# Patient Record
Sex: MALE | Race: WHITE | NOT HISPANIC OR LATINO | Employment: FULL TIME | URBAN - METROPOLITAN AREA
[De-identification: names, ages, dates, MRNs, and addresses within clinical notes are randomized per-mention and may not be internally consistent; named-entity substitution may affect disease eponyms.]

---

## 2017-02-14 RX ORDER — BENAZEPRIL HYDROCHLORIDE 10 MG/1
10 TABLET ORAL DAILY
Qty: 90 TABLET | Refills: 0 | Status: SHIPPED | OUTPATIENT
Start: 2017-02-14 | End: 2017-06-24 | Stop reason: SDUPTHER

## 2017-04-03 ENCOUNTER — OFFICE VISIT (OUTPATIENT)
Dept: FAMILY MEDICINE CLINIC | Facility: CLINIC | Age: 47
End: 2017-04-03

## 2017-04-03 VITALS
RESPIRATION RATE: 16 BRPM | BODY MASS INDEX: 41.75 KG/M2 | OXYGEN SATURATION: 97 % | SYSTOLIC BLOOD PRESSURE: 122 MMHG | HEIGHT: 73 IN | HEART RATE: 74 BPM | WEIGHT: 315 LBS | DIASTOLIC BLOOD PRESSURE: 72 MMHG

## 2017-04-03 DIAGNOSIS — H65.00 ACUTE SEROUS OTITIS MEDIA, RECURRENCE NOT SPECIFIED, UNSPECIFIED LATERALITY: Primary | ICD-10-CM

## 2017-04-03 PROCEDURE — 99213 OFFICE O/P EST LOW 20 MIN: CPT | Performed by: FAMILY MEDICINE

## 2017-04-03 RX ORDER — AZITHROMYCIN 250 MG/1
TABLET, FILM COATED ORAL
Qty: 6 TABLET | Refills: 0 | Status: SHIPPED | OUTPATIENT
Start: 2017-04-03 | End: 2017-05-16

## 2017-04-03 RX ORDER — NEOMYCIN SULFATE, POLYMYXIN B SULFATE AND HYDROCORTISONE 10; 3.5; 1 MG/ML; MG/ML; [USP'U]/ML
3 SUSPENSION/ DROPS AURICULAR (OTIC) 4 TIMES DAILY
Qty: 10 ML | Refills: 0 | Status: SHIPPED | OUTPATIENT
Start: 2017-04-03 | End: 2017-06-14

## 2017-04-03 RX ORDER — IBUPROFEN 600 MG/1
600 TABLET ORAL EVERY 6 HOURS PRN
Qty: 60 TABLET | Refills: 0 | Status: SHIPPED | OUTPATIENT
Start: 2017-04-03 | End: 2017-06-14

## 2017-04-03 NOTE — PROGRESS NOTES
"Subjective   Ming Swenson is a 46 y.o. male.     Earache    There is pain in the right ear. This is a new problem. The current episode started in the past 7 days. The problem occurs constantly. The problem has been unchanged. There has been no fever. The pain is moderate. Pertinent negatives include no coughing, diarrhea, hearing loss or sore throat. He has tried nothing (Sonido a dentist and had some x-rays and no dental problem was identified) for the symptoms. The treatment provided no relief.        The following portions of the patient's history were reviewed and updated as appropriate: allergies, current medications, past social history and problem list.    Review of Systems   Constitutional: Negative for chills and fever.   HENT: Positive for ear pain. Negative for facial swelling, hearing loss, mouth sores, nosebleeds, postnasal drip, sinus pressure, sore throat and trouble swallowing.    Eyes: Negative for discharge.   Respiratory: Negative for cough and shortness of breath.    Cardiovascular: Negative for chest pain and palpitations.   Gastrointestinal: Negative for constipation and diarrhea.   Neurological: Negative for dizziness.       Objective   /72  Pulse 74  Resp 16  Ht 73\" (185.4 cm)  Wt (!) 368 lb 12.8 oz (167 kg)  SpO2 97%  BMI 48.66 kg/m2  Physical Exam   Constitutional: He is oriented to person, place, and time. He appears well-developed and well-nourished. He is cooperative.   HENT:   Head: Normocephalic.   Right Ear: External ear normal.   Left Ear: External ear normal.   Nose: Nose normal.   Mouth/Throat: Oropharynx is clear and moist. No oropharyngeal exudate.   Right tympanic membrane was dull and pink   Eyes: Conjunctivae are normal. Pupils are equal, round, and reactive to light. No scleral icterus.   Neck: Neck supple. Carotid bruit is not present. No thyromegaly present.   Tender along the right preauricular node. Erythema of the ear canal   Cardiovascular: Normal rate and " regular rhythm.    Pulmonary/Chest: Effort normal and breath sounds normal.   Abdominal: There is no hepatosplenomegaly.   Musculoskeletal: Normal range of motion.   Neurological: He is alert and oriented to person, place, and time.   No focal deficits no lateralizing signs   Skin: Skin is warm and dry. No rash noted.   Psychiatric: He has a normal mood and affect. Cognition and memory are normal.   Nursing note and vitals reviewed.      Assessment/Plan   Problem List Items Addressed This Visit     None      Visit Diagnoses     Acute serous otitis media, recurrence not specified, unspecified laterality    -  Primary          New Medications Ordered This Visit   Medications   • azithromycin (ZITHROMAX) 250 MG tablet     Sig: Take 2 tablets the first day, then 1 tablet daily for 4 days.     Dispense:  6 tablet     Refill:  0   • neomycin-polymyxin-hydrocortisone (CORTISPORIN) 3.5-07066-8 otic suspension     Sig: Administer 3 drops to the right ear 4 (Four) Times a Day.     Dispense:  10 mL     Refill:  0   • ibuprofen (ADVIL,MOTRIN) 600 MG tablet     Sig: Take 1 tablet by mouth Every 6 (Six) Hours As Needed for Mild Pain (1-3).     Dispense:  60 tablet     Refill:  0

## 2017-04-12 ENCOUNTER — TELEPHONE (OUTPATIENT)
Dept: FAMILY MEDICINE CLINIC | Facility: CLINIC | Age: 47
End: 2017-04-12

## 2017-04-12 NOTE — TELEPHONE ENCOUNTER
----- Message from Vickie Gomez sent at 4/11/2017  8:17 AM EDT -----  Contact: [phone]  PATIENT WAS SEEN LAST WEEK AND IS CONCERNED THAT HE IS HAVING EAR DRAINAGE. PATIENT IS NOT SURE IF THIS IS NORMAL. PATIENT SAYS HE IS ONLY HAVING SLIGHT PAIN NOW        [facility] [place]      Did not contact patient because he was scheduled for an appt today, but he cancelled because he said he was feeling better.    TRINY VELAZQUEZ

## 2017-05-16 ENCOUNTER — OFFICE VISIT (OUTPATIENT)
Dept: FAMILY MEDICINE CLINIC | Facility: CLINIC | Age: 47
End: 2017-05-16

## 2017-05-16 VITALS
BODY MASS INDEX: 41.75 KG/M2 | WEIGHT: 315 LBS | SYSTOLIC BLOOD PRESSURE: 132 MMHG | DIASTOLIC BLOOD PRESSURE: 86 MMHG | RESPIRATION RATE: 16 BRPM | HEIGHT: 73 IN

## 2017-05-16 DIAGNOSIS — E66.01 MORBID OBESITY DUE TO EXCESS CALORIES (HCC): Primary | ICD-10-CM

## 2017-05-16 PROCEDURE — 99213 OFFICE O/P EST LOW 20 MIN: CPT | Performed by: FAMILY MEDICINE

## 2017-06-14 ENCOUNTER — OFFICE VISIT (OUTPATIENT)
Dept: FAMILY MEDICINE CLINIC | Facility: CLINIC | Age: 47
End: 2017-06-14

## 2017-06-14 VITALS
HEART RATE: 74 BPM | OXYGEN SATURATION: 98 % | SYSTOLIC BLOOD PRESSURE: 124 MMHG | BODY MASS INDEX: 41.75 KG/M2 | DIASTOLIC BLOOD PRESSURE: 84 MMHG | WEIGHT: 315 LBS | RESPIRATION RATE: 16 BRPM | HEIGHT: 73 IN

## 2017-06-14 DIAGNOSIS — E66.01 MORBID OBESITY DUE TO EXCESS CALORIES (HCC): Primary | ICD-10-CM

## 2017-06-14 PROCEDURE — 99213 OFFICE O/P EST LOW 20 MIN: CPT | Performed by: FAMILY MEDICINE

## 2017-06-15 NOTE — PROGRESS NOTES
"Subjective   Ming Swenson is a 46 y.o. male.     Obesity   This is a chronic problem. The problem has been gradually improving. Associated symptoms include arthralgias. Pertinent negatives include no fever, myalgias or sore throat. Treatments tried: Low carbohydrate diet and exercise. The treatment provided mild relief.        The following portions of the patient's history were reviewed and updated as appropriate: allergies, current medications, past social history and problem list.    Review of Systems   Constitutional: Negative for fever.   HENT: Negative.  Negative for sore throat.    Respiratory: Negative.    Cardiovascular: Negative.    Musculoskeletal: Positive for arthralgias. Negative for myalgias.       Objective   /84  Pulse 74  Resp 16  Ht 73\" (185.4 cm)  Wt (!) 361 lb (164 kg)  SpO2 98%  BMI 47.63 kg/m2  Physical Exam   Constitutional: He appears well-developed.   HENT:   Head: Normocephalic and atraumatic.   Eyes: Conjunctivae are normal. Pupils are equal, round, and reactive to light.   Neck: Normal range of motion. Neck supple.   Cardiovascular: Normal rate and regular rhythm.    Pulmonary/Chest: Effort normal and breath sounds normal.   Skin: Skin is warm and dry.   Nursing note and vitals reviewed.      Assessment/Plan   Problem List Items Addressed This Visit        Digestive    Morbid obesity - Primary          No orders of the defined types were placed in this encounter.    More encouragement is given technically needs more exercise.  Avoid snacking late in the evening follow-up in one month complete form for bariatric surgery         " No

## 2017-06-26 RX ORDER — BENAZEPRIL HYDROCHLORIDE 10 MG/1
TABLET ORAL
Qty: 90 TABLET | Refills: 1 | Status: SHIPPED | OUTPATIENT
Start: 2017-06-26 | End: 2017-07-14 | Stop reason: SDUPTHER

## 2017-07-14 ENCOUNTER — OFFICE VISIT (OUTPATIENT)
Dept: FAMILY MEDICINE CLINIC | Facility: CLINIC | Age: 47
End: 2017-07-14

## 2017-07-14 VITALS
OXYGEN SATURATION: 96 % | RESPIRATION RATE: 16 BRPM | TEMPERATURE: 98.1 F | HEART RATE: 74 BPM | HEIGHT: 73 IN | SYSTOLIC BLOOD PRESSURE: 130 MMHG | DIASTOLIC BLOOD PRESSURE: 78 MMHG | BODY MASS INDEX: 41.75 KG/M2 | WEIGHT: 315 LBS

## 2017-07-14 DIAGNOSIS — Z00.00 WELL ADULT EXAM: Primary | ICD-10-CM

## 2017-07-14 DIAGNOSIS — I10 ESSENTIAL HYPERTENSION: ICD-10-CM

## 2017-07-14 DIAGNOSIS — E66.01 MORBID OBESITY DUE TO EXCESS CALORIES (HCC): ICD-10-CM

## 2017-07-14 LAB
ALBUMIN SERPL-MCNC: 4.4 G/DL (ref 3.2–4.8)
ALBUMIN/GLOB SERPL: 1.5 G/DL (ref 1.5–2.5)
ALP SERPL-CCNC: 63 U/L (ref 25–100)
ALT SERPL W P-5'-P-CCNC: 46 U/L (ref 7–40)
ANION GAP SERPL CALCULATED.3IONS-SCNC: 9 MMOL/L (ref 3–11)
ARTICHOKE IGE QN: 126 MG/DL (ref 0–130)
AST SERPL-CCNC: 27 U/L (ref 0–33)
BASOPHILS # BLD AUTO: 0.07 10*3/MM3 (ref 0–0.2)
BASOPHILS NFR BLD AUTO: 1 % (ref 0–1)
BILIRUB BLD-MCNC: NEGATIVE MG/DL
BILIRUB SERPL-MCNC: 0.3 MG/DL (ref 0.3–1.2)
BUN BLD-MCNC: 14 MG/DL (ref 9–23)
BUN/CREAT SERPL: 20 (ref 7–25)
CALCIUM SPEC-SCNC: 9.8 MG/DL (ref 8.7–10.4)
CHLORIDE SERPL-SCNC: 98 MMOL/L (ref 99–109)
CHOLEST SERPL-MCNC: 196 MG/DL (ref 0–200)
CLARITY, POC: CLEAR
CO2 SERPL-SCNC: 26 MMOL/L (ref 20–31)
COLOR UR: YELLOW
CREAT BLD-MCNC: 0.7 MG/DL (ref 0.6–1.3)
DEPRECATED RDW RBC AUTO: 45.8 FL (ref 37–54)
EOSINOPHIL # BLD AUTO: 0.29 10*3/MM3 (ref 0–0.3)
EOSINOPHIL NFR BLD AUTO: 4.1 % (ref 0–3)
ERYTHROCYTE [DISTWIDTH] IN BLOOD BY AUTOMATED COUNT: 14.1 % (ref 11.3–14.5)
GFR SERPL CREATININE-BSD FRML MDRD: 123 ML/MIN/1.73
GLOBULIN UR ELPH-MCNC: 3 GM/DL
GLUCOSE BLD-MCNC: 100 MG/DL (ref 70–100)
GLUCOSE UR STRIP-MCNC: NEGATIVE MG/DL
HCT VFR BLD AUTO: 47.4 % (ref 38.9–50.9)
HDLC SERPL-MCNC: 44 MG/DL (ref 40–60)
HGB BLD-MCNC: 15.6 G/DL (ref 13.1–17.5)
IMM GRANULOCYTES # BLD: 0.02 10*3/MM3 (ref 0–0.03)
IMM GRANULOCYTES NFR BLD: 0.3 % (ref 0–0.6)
KETONES UR QL: NEGATIVE
LEUKOCYTE EST, POC: NEGATIVE
LYMPHOCYTES # BLD AUTO: 1.96 10*3/MM3 (ref 0.6–4.8)
LYMPHOCYTES NFR BLD AUTO: 27.8 % (ref 24–44)
MCH RBC QN AUTO: 29.3 PG (ref 27–31)
MCHC RBC AUTO-ENTMCNC: 32.9 G/DL (ref 32–36)
MCV RBC AUTO: 88.9 FL (ref 80–99)
MONOCYTES # BLD AUTO: 0.62 10*3/MM3 (ref 0–1)
MONOCYTES NFR BLD AUTO: 8.8 % (ref 0–12)
NEUTROPHILS # BLD AUTO: 4.09 10*3/MM3 (ref 1.5–8.3)
NEUTROPHILS NFR BLD AUTO: 58 % (ref 41–71)
NITRITE UR-MCNC: NEGATIVE MG/ML
PH UR: 5.5 [PH] (ref 5–8)
PLATELET # BLD AUTO: 216 10*3/MM3 (ref 150–450)
PMV BLD AUTO: 12.5 FL (ref 6–12)
POTASSIUM BLD-SCNC: 4.8 MMOL/L (ref 3.5–5.5)
PROT SERPL-MCNC: 7.4 G/DL (ref 5.7–8.2)
PROT UR STRIP-MCNC: NEGATIVE MG/DL
PSA SERPL-MCNC: 0.25 NG/ML (ref 0–4)
RBC # BLD AUTO: 5.33 10*6/MM3 (ref 4.2–5.76)
RBC # UR STRIP: NEGATIVE /UL
SODIUM BLD-SCNC: 133 MMOL/L (ref 132–146)
SP GR UR: 1.02 (ref 1–1.03)
TRIGL SERPL-MCNC: 294 MG/DL (ref 0–150)
TSH SERPL DL<=0.05 MIU/L-ACNC: 1.28 MIU/ML (ref 0.35–5.35)
UROBILINOGEN UR QL: NORMAL
WBC NRBC COR # BLD: 7.05 10*3/MM3 (ref 3.5–10.8)

## 2017-07-14 PROCEDURE — 84153 ASSAY OF PSA TOTAL: CPT | Performed by: FAMILY MEDICINE

## 2017-07-14 PROCEDURE — 84443 ASSAY THYROID STIM HORMONE: CPT | Performed by: FAMILY MEDICINE

## 2017-07-14 PROCEDURE — 93000 ELECTROCARDIOGRAM COMPLETE: CPT | Performed by: FAMILY MEDICINE

## 2017-07-14 PROCEDURE — 36415 COLL VENOUS BLD VENIPUNCTURE: CPT | Performed by: FAMILY MEDICINE

## 2017-07-14 PROCEDURE — 80053 COMPREHEN METABOLIC PANEL: CPT | Performed by: FAMILY MEDICINE

## 2017-07-14 PROCEDURE — 99396 PREV VISIT EST AGE 40-64: CPT | Performed by: FAMILY MEDICINE

## 2017-07-14 PROCEDURE — 85025 COMPLETE CBC W/AUTO DIFF WBC: CPT | Performed by: FAMILY MEDICINE

## 2017-07-14 PROCEDURE — 81003 URINALYSIS AUTO W/O SCOPE: CPT | Performed by: FAMILY MEDICINE

## 2017-07-14 PROCEDURE — 80061 LIPID PANEL: CPT | Performed by: FAMILY MEDICINE

## 2017-07-14 RX ORDER — BENAZEPRIL HYDROCHLORIDE 10 MG/1
10 TABLET ORAL DAILY
Qty: 90 TABLET | Refills: 1 | Status: SHIPPED | OUTPATIENT
Start: 2017-07-14 | End: 2018-07-05 | Stop reason: SDUPTHER

## 2017-07-14 NOTE — PROGRESS NOTES
"Subjective   Ming Swenson is a 46 y.o. male and is here for a comprehensive physical exam. The patient reports problems - weight.        Are you taking aspirin daily? no        The following portions of the patient's history were reviewed and updated as appropriate: allergies, current medications, past family history, past medical history, past social history, past surgical history and problem list.    Review of Systems  Do you have pain that bothers you in your daily life? yes  A comprehensive review of systems was negative.  Except for chronic pain in his right ankle ongoing issues with obesity lands on gastric surgery.    Objective   /78  Pulse 74  Temp 98.1 °F (36.7 °C)  Resp 16  Ht 73\" (185.4 cm)  Wt (!) 361 lb 6.4 oz (164 kg)  SpO2 96%  BMI 47.68 kg/m2    General Appearance:    Alert, cooperative, no distress, appears stated age,Morbidly obese .   Head:    Normocephalic, without obvious abnormality, atraumatic   Eyes:    PERRL, conjunctiva/corneas clear, EOM's intact, fundi     benign, both eyes        Ears:    Normal TM's and external ear canals, both ears   Nose:   Nares normal, septum midline, mucosa normal, no drainage    or sinus tenderness   Throat:   Lips, mucosa, and tongue normal; teeth and gums normal   Neck:   Supple, symmetrical, trachea midline, no adenopathy;        thyroid:  No enlargement/tenderness/nodules; no carotid    bruit or JVD   Back:     Symmetric, no curvature, ROM normal, no CVA tenderness   Lungs:     Clear to auscultation bilaterally, respirations unlabored   Chest wall:    No tenderness or deformity   Heart:    Regular rate and rhythm, S1 and S2 normal, no murmur, rub   or gallop   Abdomen:     Soft, non-tender, bowel sounds active all four quadrants,     no masses, no organomegaly   Genitalia:    Normal male without lesion, discharge or tenderness   Rectal:    deferred   Extremities:   Extremities normal, atraumatic, no cyanosis or edema   Pulses:   2+ and symmetric " all extremities   Skin:   Skin color, texture, turgor normal, no rashes or lesions   Lymph nodes:   Cervical, supraclavicular, and axillary nodes normal   Neurologic:   CNII-XII intact. Normal strength, sensation and reflexes       throughout        Assessment/Plan   Healthy male exam.      1. Hypertension, morbid obesity, DJD right ankle, CMT disease.  2. Patient Counseling:  --Nutrition: Stressed importance of moderation in sodium/caffeine intake, saturated fat and cholesterol, caloric balance, sufficient intake of fresh fruits, vegetables, fiber, calcium, iron, and 1 mg of folate supplement per day (for females capable of pregnancy).  --Discussed the issue of estrogen replacement, calcium supplement, and the daily use of baby aspirin.  --Exercise: Stressed the importance of regular exercise.   --Substance Abuse: Discussed cessation/primary prevention of tobacco, alcohol, or other drug use; driving or other dangerous activities under the influence; availability of treatment for abuse.    --Sexuality: Discussed sexually transmitted diseases, partner selection, use of condoms, avoidance of unintended pregnancy  and contraceptive alternatives.   --Injury prevention: Discussed safety belts, safety helmets, smoke detector, smoking near bedding or upholstery.   --Dental health: Discussed importance of regular tooth brushing, flossing, and dental visits.  --Immunizations reviewed.  --Discussed benefits of screening colonoscopy.  --After hours service discussed with patient    3. Discussed the patient's BMI with him.  The BMI is above average; BMI management plan is completed  4. Follow up in 1 month        ECG 12 Lead  Date/Time: 7/14/2017 2:53 PM  Performed by: CAMILA LOVE  Authorized by: CAMILA LOVE   Rhythm: sinus rhythm  Rate: normal  BPM: 68  Conduction: conduction normal  ST Segments: ST segments normal  T Waves: T waves normal  QRS axis: normal  Q waves: II, III and aVF  Clinical impression: non-specific  ECG  Comments: Borderline tracing inferior Q waves are unchanged from previous years.          See cpx form

## 2017-08-14 ENCOUNTER — OFFICE VISIT (OUTPATIENT)
Dept: FAMILY MEDICINE CLINIC | Facility: CLINIC | Age: 47
End: 2017-08-14

## 2017-08-14 VITALS
HEART RATE: 76 BPM | OXYGEN SATURATION: 98 % | WEIGHT: 315 LBS | RESPIRATION RATE: 16 BRPM | BODY MASS INDEX: 41.75 KG/M2 | SYSTOLIC BLOOD PRESSURE: 128 MMHG | DIASTOLIC BLOOD PRESSURE: 82 MMHG | HEIGHT: 73 IN

## 2017-08-14 DIAGNOSIS — I10 ESSENTIAL HYPERTENSION: ICD-10-CM

## 2017-08-14 DIAGNOSIS — S93.401S SPRAIN OF RIGHT ANKLE, UNSPECIFIED LIGAMENT, SEQUELA: ICD-10-CM

## 2017-08-14 DIAGNOSIS — E66.01 MORBID OBESITY DUE TO EXCESS CALORIES (HCC): Primary | ICD-10-CM

## 2017-08-14 PROCEDURE — 99213 OFFICE O/P EST LOW 20 MIN: CPT | Performed by: FAMILY MEDICINE

## 2017-08-14 NOTE — PROGRESS NOTES
"Subjective   Ming Swenson is a 46 y.o. male.     Hypertension   This is a chronic problem. The problem is controlled. Pertinent negatives include no chest pain, headaches, palpitations, peripheral edema or shortness of breath. Past treatments include ACE inhibitors. The current treatment provides significant improvement. Compliance problems include diet.  There is no history of angina, kidney disease or CAD/MI.   Obesity   This is a chronic (Recently returned from a vacation and ate more than he should've anything on getting back on his diet today) problem. The problem has been gradually improving. Associated symptoms include arthralgias. Pertinent negatives include no chest pain, congestion, coughing, fatigue, headaches, myalgias, nausea, rash, sore throat or weakness. The treatment provided mild relief.        The following portions of the patient's history were reviewed and updated as appropriate: allergies, current medications, past social history and problem list.    Review of Systems   Constitutional: Negative for fatigue and unexpected weight change.   HENT: Negative for congestion and sore throat.    Respiratory: Negative for cough, chest tightness and shortness of breath.    Cardiovascular: Negative for chest pain, palpitations and leg swelling.   Gastrointestinal: Negative for nausea.   Genitourinary: Negative for dysuria and frequency.   Musculoskeletal: Positive for arthralgias. Negative for myalgias.        Pain and swelling of the right ankle which he turned on vacation   Skin: Negative for color change and rash.   Neurological: Negative for dizziness, syncope, weakness and headaches.       Objective   /82  Pulse 76  Resp 16  Ht 73\" (185.4 cm)  Wt (!) 366 lb 12.8 oz (166 kg)  SpO2 98%  BMI 48.39 kg/m2  Physical Exam   Constitutional: He is oriented to person, place, and time. He appears well-developed and well-nourished. He is cooperative.   HENT:   Head: Normocephalic.   Right Ear: " External ear normal.   Left Ear: External ear normal.   Nose: Nose normal.   Mouth/Throat: Oropharynx is clear and moist.   Eyes: Conjunctivae are normal. Pupils are equal, round, and reactive to light. No scleral icterus.   Neck: Neck supple. Carotid bruit is not present. No thyromegaly present.   Cardiovascular: Normal rate, regular rhythm and normal heart sounds.    Pulmonary/Chest: Effort normal and breath sounds normal.   Abdominal: There is no hepatosplenomegaly.   Musculoskeletal: Normal range of motion. He exhibits edema and tenderness.   An ankle is swollen and mildly tender   Neurological: He is alert and oriented to person, place, and time.   No focal deficits no lateralizing signs   Skin: Skin is warm and dry. No rash noted.   Psychiatric: He has a normal mood and affect. Cognition and memory are normal.   Nursing note and vitals reviewed.      Assessment/Plan   Problem List Items Addressed This Visit        Cardiovascular and Mediastinum    Hypertension       Digestive    Morbid obesity - Primary      Other Visit Diagnoses     Sprain of right ankle, unspecified ligament, sequela              No orders of the defined types were placed in this encounter.    I recommend improved compliance with dietary restrictions and follow-up in one month

## 2017-09-14 ENCOUNTER — OFFICE VISIT (OUTPATIENT)
Dept: FAMILY MEDICINE CLINIC | Facility: CLINIC | Age: 47
End: 2017-09-14

## 2017-09-14 VITALS
SYSTOLIC BLOOD PRESSURE: 124 MMHG | WEIGHT: 315 LBS | HEIGHT: 73 IN | HEART RATE: 87 BPM | BODY MASS INDEX: 41.75 KG/M2 | DIASTOLIC BLOOD PRESSURE: 70 MMHG | OXYGEN SATURATION: 97 % | RESPIRATION RATE: 16 BRPM

## 2017-09-14 DIAGNOSIS — E66.01 MORBID OBESITY DUE TO EXCESS CALORIES (HCC): Primary | ICD-10-CM

## 2017-09-14 DIAGNOSIS — I10 ESSENTIAL HYPERTENSION: ICD-10-CM

## 2017-09-14 PROCEDURE — 99213 OFFICE O/P EST LOW 20 MIN: CPT | Performed by: FAMILY MEDICINE

## 2017-09-15 NOTE — PROGRESS NOTES
"Subjective   Ming Swenson is a 46 y.o. male.     Hypertension   This is a chronic problem. The problem is controlled. Pertinent negatives include no chest pain, headaches, palpitations, peripheral edema or shortness of breath. Past treatments include ACE inhibitors. The current treatment provides significant improvement. Compliance problems include diet.  There is no history of angina, kidney disease or CAD/MI.   Obesity   This is a chronic (Recently returned from a vacation and ate more than he should've anything on getting back on his diet today) problem. The problem has been gradually improving. Associated symptoms include arthralgias. Pertinent negatives include no chest pain, congestion, coughing, fatigue, headaches, myalgias, nausea, rash, sore throat or weakness. The treatment provided mild relief.        The following portions of the patient's history were reviewed and updated as appropriate: allergies, current medications, past social history and problem list.    Review of Systems   Constitutional: Negative for fatigue and unexpected weight change.   HENT: Negative for congestion and sore throat.    Respiratory: Negative for cough, chest tightness and shortness of breath.    Cardiovascular: Negative for chest pain, palpitations and leg swelling.   Gastrointestinal: Negative for nausea.   Musculoskeletal: Positive for arthralgias. Negative for myalgias.   Skin: Negative for color change and rash.   Neurological: Negative for dizziness, syncope, weakness and headaches.       Objective   /70  Pulse 87  Resp 16  Ht 73\" (185.4 cm)  Wt (!) 361 lb (164 kg)  SpO2 97%  BMI 47.63 kg/m2  Physical Exam   Constitutional: He is oriented to person, place, and time. He appears well-developed and well-nourished.   HENT:   Head: Normocephalic.   Eyes: Conjunctivae are normal. Pupils are equal, round, and reactive to light.   Cardiovascular: Normal rate and regular rhythm.    Pulmonary/Chest: Effort normal and " breath sounds normal.   Musculoskeletal: Normal range of motion.   Neurological: He is alert and oriented to person, place, and time.   Nursing note and vitals reviewed.      Assessment/Plan   Problem List Items Addressed This Visit        Cardiovascular and Mediastinum    Hypertension       Digestive    Morbid obesity - Primary          No orders of the defined types were placed in this encounter.    Continue diet and exercise.

## 2017-10-20 ENCOUNTER — OFFICE VISIT (OUTPATIENT)
Dept: FAMILY MEDICINE CLINIC | Facility: CLINIC | Age: 47
End: 2017-10-20

## 2017-10-20 VITALS
RESPIRATION RATE: 16 BRPM | OXYGEN SATURATION: 97 % | BODY MASS INDEX: 41.75 KG/M2 | WEIGHT: 315 LBS | DIASTOLIC BLOOD PRESSURE: 84 MMHG | SYSTOLIC BLOOD PRESSURE: 126 MMHG | HEIGHT: 73 IN | HEART RATE: 78 BPM

## 2017-10-20 DIAGNOSIS — E66.01 MORBID OBESITY (HCC): ICD-10-CM

## 2017-10-20 DIAGNOSIS — I10 ESSENTIAL HYPERTENSION: Primary | ICD-10-CM

## 2017-10-20 PROCEDURE — 99213 OFFICE O/P EST LOW 20 MIN: CPT | Performed by: FAMILY MEDICINE

## 2017-10-23 NOTE — PROGRESS NOTES
"Subjective   Ming Swenson is a 47 y.o. male.     Hypertension   This is a chronic problem. The problem is unchanged. The problem is controlled. Associated symptoms include malaise/fatigue and peripheral edema. Pertinent negatives include no chest pain, headaches, palpitations or shortness of breath. Risk factors for coronary artery disease include male gender and obesity. Past treatments include ACE inhibitors. The current treatment provides significant improvement. There is no history of angina, kidney disease or CAD/MI.   Obesity   This is a chronic problem. The current episode started more than 1 year ago. The problem has been unchanged. Associated symptoms include arthralgias and joint swelling. Pertinent negatives include no chest pain, coughing, fatigue, headaches, nausea, rash or weakness. The symptoms are aggravated by walking. The treatment provided mild relief.        The following portions of the patient's history were reviewed and updated as appropriate: allergies, current medications, past social history and problem list.    Review of Systems   Constitutional: Positive for malaise/fatigue. Negative for fatigue and unexpected weight change.   Respiratory: Negative for cough, chest tightness and shortness of breath.    Cardiovascular: Negative for chest pain, palpitations and leg swelling.   Gastrointestinal: Negative for nausea.   Musculoskeletal: Positive for arthralgias and joint swelling.        Difficult time walking without his supportive shoes may consider getting some crutches   Skin: Negative for color change and rash.   Neurological: Negative for dizziness, syncope, weakness and headaches.   Psychiatric/Behavioral: Negative for agitation and dysphoric mood.       Objective   /84  Pulse 78  Resp 16  Ht 73\" (185.4 cm)  Wt (!) 363 lb 9.6 oz (165 kg)  SpO2 97%  BMI 47.97 kg/m2  Physical Exam   Constitutional: He is oriented to person, place, and time. He appears well-developed and " well-nourished. He is cooperative.   HENT:   Head: Normocephalic.   Right Ear: External ear normal.   Left Ear: External ear normal.   Nose: Nose normal.   Mouth/Throat: Oropharynx is clear and moist.   Eyes: Conjunctivae are normal. Pupils are equal, round, and reactive to light. No scleral icterus.   Neck: Neck supple. Carotid bruit is not present. No thyromegaly present.   Cardiovascular: Normal rate and regular rhythm.    Pulmonary/Chest: Effort normal and breath sounds normal.   Abdominal: Soft. There is no hepatosplenomegaly.   Musculoskeletal: Normal range of motion. He exhibits tenderness.   Foot deformity consistent with a CMT   Neurological: He is alert and oriented to person, place, and time.   No focal deficits no lateralizing signs   Skin: Skin is warm and dry. No rash noted.   Psychiatric: He has a normal mood and affect. Cognition and memory are normal.   Nursing note and vitals reviewed.      Assessment/Plan   Problem List Items Addressed This Visit     Hypertension - Primary    Morbid obesity        Encouraged better compliance with diet and exercise he may need to use some crutches to get in and out of the swimming pool.  He has one more visit with me to maintain his requirement for gastroplasty.    No orders of the defined types were placed in this encounter.

## 2017-11-30 ENCOUNTER — OFFICE VISIT (OUTPATIENT)
Dept: FAMILY MEDICINE CLINIC | Facility: CLINIC | Age: 47
End: 2017-11-30

## 2017-11-30 VITALS
SYSTOLIC BLOOD PRESSURE: 126 MMHG | HEIGHT: 73 IN | HEART RATE: 86 BPM | OXYGEN SATURATION: 98 % | BODY MASS INDEX: 41.75 KG/M2 | DIASTOLIC BLOOD PRESSURE: 82 MMHG | WEIGHT: 315 LBS | RESPIRATION RATE: 16 BRPM

## 2017-11-30 DIAGNOSIS — I10 ESSENTIAL HYPERTENSION: Primary | ICD-10-CM

## 2017-11-30 DIAGNOSIS — E66.01 MORBID OBESITY (HCC): ICD-10-CM

## 2017-11-30 PROCEDURE — 99213 OFFICE O/P EST LOW 20 MIN: CPT | Performed by: FAMILY MEDICINE

## 2017-11-30 NOTE — PROGRESS NOTES
"Subjective   Ming Swenson is a 47 y.o. male.     Hypertension   This is a chronic problem. The problem is controlled. Pertinent negatives include no chest pain, headaches, malaise/fatigue, palpitations, peripheral edema or shortness of breath. Risk factors for coronary artery disease include obesity, male gender and sedentary lifestyle. Past treatments include ACE inhibitors. The current treatment provides significant improvement. Compliance problems include diet.  There is no history of angina, kidney disease or CAD/MI.   Obesity   This is a chronic problem. The problem has been unchanged. Associated symptoms include arthralgias. Pertinent negatives include no abdominal pain, chest pain, congestion, coughing, fatigue, headaches, nausea, rash or weakness. Treatments tried: reduced calorie diet. The treatment provided no relief.        The following portions of the patient's history were reviewed and updated as appropriate: allergies, current medications, past social history and problem list.    Review of Systems   Constitutional: Negative for activity change, fatigue, malaise/fatigue and unexpected weight change.   HENT: Negative for congestion and sinus pressure.    Respiratory: Negative for cough, chest tightness and shortness of breath.    Cardiovascular: Negative for chest pain, palpitations and leg swelling.   Gastrointestinal: Negative for abdominal pain and nausea.   Genitourinary: Negative for dysuria and hematuria.   Musculoskeletal: Positive for arthralgias and gait problem.   Skin: Negative for color change and rash.   Neurological: Negative for dizziness, syncope, weakness and headaches.       Objective   /82  Pulse 86  Resp 16  Ht 73\" (185.4 cm)  Wt (!) 367 lb 6.4 oz (167 kg)  SpO2 98%  BMI 48.47 kg/m2  Physical Exam   Constitutional: He is oriented to person, place, and time. He appears well-developed and well-nourished. He is cooperative.   HENT:   Head: Normocephalic.   Right Ear: " External ear normal.   Left Ear: External ear normal.   Nose: Nose normal.   Mouth/Throat: Oropharynx is clear and moist.   Eyes: Conjunctivae are normal. Pupils are equal, round, and reactive to light. No scleral icterus.   Neck: Neck supple. Carotid bruit is not present. No thyromegaly present.   Cardiovascular: Normal rate, regular rhythm and normal heart sounds.    Pulmonary/Chest: Effort normal and breath sounds normal.   Abdominal: There is no hepatosplenomegaly.   Musculoskeletal: Normal range of motion. He exhibits no edema.   Neurological: He is alert and oriented to person, place, and time.   No focal deficits no lateralizing signs   Skin: Skin is warm and dry. No rash noted.   Psychiatric: He has a normal mood and affect. Cognition and memory are normal.   Nursing note and vitals reviewed.      Assessment/Plan   Problem List Items Addressed This Visit     Hypertension - Primary          No orders of the defined types were placed in this encounter.    Form completed for bariatric surgery and will be faxed into their office.  Ming seems to be motivated to have the surgery he has not been very effective with the diet he tends to gain weight easily.

## 2018-07-05 ENCOUNTER — OFFICE VISIT (OUTPATIENT)
Dept: FAMILY MEDICINE CLINIC | Facility: CLINIC | Age: 48
End: 2018-07-05

## 2018-07-05 VITALS
WEIGHT: 315 LBS | OXYGEN SATURATION: 97 % | HEART RATE: 73 BPM | DIASTOLIC BLOOD PRESSURE: 82 MMHG | RESPIRATION RATE: 16 BRPM | BODY MASS INDEX: 41.75 KG/M2 | SYSTOLIC BLOOD PRESSURE: 122 MMHG | HEIGHT: 73 IN

## 2018-07-05 DIAGNOSIS — I10 ESSENTIAL HYPERTENSION: ICD-10-CM

## 2018-07-05 DIAGNOSIS — R60.1 GENERALIZED EDEMA: Primary | ICD-10-CM

## 2018-07-05 PROCEDURE — 99213 OFFICE O/P EST LOW 20 MIN: CPT | Performed by: FAMILY MEDICINE

## 2018-07-05 RX ORDER — TRIAMTERENE AND HYDROCHLOROTHIAZIDE 37.5; 25 MG/1; MG/1
0.5 TABLET ORAL DAILY
Qty: 15 TABLET | Refills: 2 | Status: SHIPPED | OUTPATIENT
Start: 2018-07-05 | End: 2018-10-12 | Stop reason: SDUPTHER

## 2018-07-05 RX ORDER — BENAZEPRIL HYDROCHLORIDE 10 MG/1
10 TABLET ORAL DAILY
Qty: 90 TABLET | Refills: 0 | Status: SHIPPED | OUTPATIENT
Start: 2018-07-05 | End: 2018-10-12 | Stop reason: SDUPTHER

## 2018-07-06 NOTE — PROGRESS NOTES
"Subjective   Ming Swenson is a 47 y.o. male.     Leg Swelling   This is a new (Swelling down after sleeping ) problem. The current episode started 1 to 4 weeks ago. The problem occurs daily. The problem has been unchanged. Pertinent negatives include no chest pain, chills, congestion, coughing, fatigue, fever, headaches, nausea, rash or weakness. The symptoms are aggravated by standing. He has tried position changes for the symptoms. The treatment provided no relief.   Hypertension   This is a chronic problem. The current episode started more than 1 year ago. The problem is unchanged. The problem is controlled. Associated symptoms include peripheral edema. Pertinent negatives include no chest pain, headaches, palpitations or shortness of breath. Risk factors for coronary artery disease include male gender, obesity and sedentary lifestyle. Past treatments include ACE inhibitors. Current antihypertension treatment includes ACE inhibitors, diuretics and lifestyle changes. The current treatment provides moderate improvement. Compliance problems include diet.  There is no history of angina, kidney disease or CAD/MI.        The following portions of the patient's history were reviewed and updated as appropriate: allergies, current medications, past social history and problem list.    Review of Systems   Constitutional: Negative for chills, fatigue, fever and unexpected weight change.   HENT: Negative for congestion and sinus pressure.    Respiratory: Negative for cough, chest tightness and shortness of breath.    Cardiovascular: Positive for leg swelling. Negative for chest pain and palpitations.   Gastrointestinal: Negative for nausea.   Genitourinary: Negative for dysuria and hematuria.   Skin: Negative for color change and rash.   Neurological: Negative for dizziness, syncope, weakness and headaches.       Objective   /82   Pulse 73   Resp 16   Ht 185.4 cm (73\")   Wt (!) 180 kg (396 lb)   SpO2 97%   BMI " 52.25 kg/m²   Physical Exam   Constitutional: He is oriented to person, place, and time. He appears well-developed and well-nourished. He is cooperative.   Morbidly obese white male in no acute distress   HENT:   Head: Normocephalic.   Right Ear: External ear normal.   Left Ear: External ear normal.   Nose: Nose normal.   Mouth/Throat: Oropharynx is clear and moist.   Eyes: Conjunctivae are normal. Pupils are equal, round, and reactive to light. No scleral icterus.   Neck: Neck supple. Carotid bruit is not present. No thyromegaly present.   Cardiovascular: Normal rate and regular rhythm.    Pulmonary/Chest: Effort normal and breath sounds normal.   Abdominal: There is no hepatosplenomegaly.   Musculoskeletal: Normal range of motion. He exhibits edema.   Edema at the ankles   Neurological: He is alert and oriented to person, place, and time.   No focal deficits no lateralizing signs   Skin: Skin is warm and dry. No rash noted.   Psychiatric: He has a normal mood and affect. Cognition and memory are normal.   Nursing note and vitals reviewed.      Assessment/Plan   Problem List Items Addressed This Visit     Hypertension    Relevant Medications    benazepril (LOTENSIN) 10 MG tablet    triamterene-hydrochlorothiazide (MAXZIDE-25) 37.5-25 MG per tablet      Other Visit Diagnoses     Generalized edema    -  Primary          New Medications Ordered This Visit   Medications   • benazepril (LOTENSIN) 10 MG tablet     Sig: Take 1 tablet by mouth Daily.     Dispense:  90 tablet     Refill:  0   • triamterene-hydrochlorothiazide (MAXZIDE-25) 37.5-25 MG per tablet     Sig: Take 0.5 tablets by mouth Daily.     Dispense:  15 tablet     Refill:  2     Strongly encouraged entering a weight loss program or considering weight loss surgery.  Add a diuretic encouraged salt reduction elevation of the legs whenever possible.  Follow up soon for a complete physical with fasting lab work.

## 2018-07-13 ENCOUNTER — DOCUMENTATION (OUTPATIENT)
Dept: FAMILY MEDICINE CLINIC | Facility: CLINIC | Age: 48
End: 2018-07-13

## 2018-07-13 ENCOUNTER — TELEPHONE (OUTPATIENT)
Dept: FAMILY MEDICINE CLINIC | Facility: CLINIC | Age: 48
End: 2018-07-13

## 2018-07-13 NOTE — TELEPHONE ENCOUNTER
Called informed pt that letter can be picked up in front office.  TRINY VELAZQUEZ    ----- Message from Karly Mao sent at 7/13/2018  9:27 AM EDT -----  Regarding: JURY DUTY LETTER  Contact: [phone]  JURY DUTY LETTER TO GET OUT OF IT FROM BACK FUSION

## 2018-07-18 ENCOUNTER — OFFICE VISIT (OUTPATIENT)
Dept: FAMILY MEDICINE CLINIC | Facility: CLINIC | Age: 48
End: 2018-07-18

## 2018-07-18 VITALS
SYSTOLIC BLOOD PRESSURE: 118 MMHG | BODY MASS INDEX: 41.75 KG/M2 | DIASTOLIC BLOOD PRESSURE: 76 MMHG | RESPIRATION RATE: 16 BRPM | OXYGEN SATURATION: 98 % | HEART RATE: 78 BPM | TEMPERATURE: 98.7 F | WEIGHT: 315 LBS | HEIGHT: 73 IN

## 2018-07-18 DIAGNOSIS — I10 ESSENTIAL HYPERTENSION: ICD-10-CM

## 2018-07-18 DIAGNOSIS — G60.0 CHARCOT-MARIE-TOOTH DISEASE: ICD-10-CM

## 2018-07-18 DIAGNOSIS — M79.671 RIGHT FOOT PAIN: ICD-10-CM

## 2018-07-18 DIAGNOSIS — E66.01 MORBID OBESITY (HCC): ICD-10-CM

## 2018-07-18 DIAGNOSIS — F32.89 OTHER DEPRESSION: ICD-10-CM

## 2018-07-18 DIAGNOSIS — M25.552 LEFT HIP PAIN: ICD-10-CM

## 2018-07-18 DIAGNOSIS — Z00.00 WELL ADULT EXAM: Primary | ICD-10-CM

## 2018-07-18 LAB
ALBUMIN SERPL-MCNC: 4.55 G/DL (ref 3.2–4.8)
ALBUMIN/GLOB SERPL: 1.4 G/DL (ref 1.5–2.5)
ALP SERPL-CCNC: 67 U/L (ref 25–100)
ALT SERPL W P-5'-P-CCNC: 46 U/L (ref 7–40)
ANION GAP SERPL CALCULATED.3IONS-SCNC: 7 MMOL/L (ref 3–11)
ARTICHOKE IGE QN: 138 MG/DL (ref 0–130)
AST SERPL-CCNC: 24 U/L (ref 0–33)
BASOPHILS # BLD AUTO: 0.05 10*3/MM3 (ref 0–0.2)
BASOPHILS NFR BLD AUTO: 0.6 % (ref 0–1)
BILIRUB BLD-MCNC: NEGATIVE MG/DL
BILIRUB SERPL-MCNC: 0.4 MG/DL (ref 0.3–1.2)
BUN BLD-MCNC: 14 MG/DL (ref 9–23)
BUN/CREAT SERPL: 17.9 (ref 7–25)
CALCIUM SPEC-SCNC: 9.6 MG/DL (ref 8.7–10.4)
CHLORIDE SERPL-SCNC: 105 MMOL/L (ref 99–109)
CHOLEST SERPL-MCNC: 195 MG/DL (ref 0–200)
CLARITY, POC: CLEAR
CO2 SERPL-SCNC: 29 MMOL/L (ref 20–31)
COLOR UR: YELLOW
CREAT BLD-MCNC: 0.78 MG/DL (ref 0.6–1.3)
DEPRECATED RDW RBC AUTO: 44.8 FL (ref 37–54)
EOSINOPHIL # BLD AUTO: 0.35 10*3/MM3 (ref 0–0.3)
EOSINOPHIL NFR BLD AUTO: 4.3 % (ref 0–3)
ERYTHROCYTE [DISTWIDTH] IN BLOOD BY AUTOMATED COUNT: 14.2 % (ref 11.3–14.5)
EXPIRATION DATE: NORMAL
GFR SERPL CREATININE-BSD FRML MDRD: 108 ML/MIN/1.73
GLOBULIN UR ELPH-MCNC: 3.2 GM/DL
GLUCOSE BLD-MCNC: 94 MG/DL (ref 70–100)
GLUCOSE UR STRIP-MCNC: NEGATIVE MG/DL
HBA1C MFR BLD: 6.4 % (ref 4.8–5.6)
HCT VFR BLD AUTO: 46.6 % (ref 38.9–50.9)
HDLC SERPL-MCNC: 54 MG/DL (ref 40–60)
HGB BLD-MCNC: 15.6 G/DL (ref 13.1–17.5)
IMM GRANULOCYTES # BLD: 0.04 10*3/MM3 (ref 0–0.03)
IMM GRANULOCYTES NFR BLD: 0.5 % (ref 0–0.6)
KETONES UR QL: NEGATIVE
LEUKOCYTE EST, POC: NEGATIVE
LYMPHOCYTES # BLD AUTO: 2.46 10*3/MM3 (ref 0.6–4.8)
LYMPHOCYTES NFR BLD AUTO: 30.3 % (ref 24–44)
Lab: NORMAL
MCH RBC QN AUTO: 29.2 PG (ref 27–31)
MCHC RBC AUTO-ENTMCNC: 33.5 G/DL (ref 32–36)
MCV RBC AUTO: 87.3 FL (ref 80–99)
MONOCYTES # BLD AUTO: 0.77 10*3/MM3 (ref 0–1)
MONOCYTES NFR BLD AUTO: 9.5 % (ref 0–12)
NEUTROPHILS # BLD AUTO: 4.5 10*3/MM3 (ref 1.5–8.3)
NEUTROPHILS NFR BLD AUTO: 55.3 % (ref 41–71)
NITRITE UR-MCNC: NEGATIVE MG/ML
PH UR: 5.5 [PH] (ref 5–8)
PLATELET # BLD AUTO: 260 10*3/MM3 (ref 150–450)
PMV BLD AUTO: 12 FL (ref 6–12)
POTASSIUM BLD-SCNC: 4.8 MMOL/L (ref 3.5–5.5)
PROT SERPL-MCNC: 7.7 G/DL (ref 5.7–8.2)
PROT UR STRIP-MCNC: NEGATIVE MG/DL
PSA SERPL-MCNC: 0.24 NG/ML (ref 0–4)
RBC # BLD AUTO: 5.34 10*6/MM3 (ref 4.2–5.76)
RBC # UR STRIP: NEGATIVE /UL
SODIUM BLD-SCNC: 141 MMOL/L (ref 132–146)
SP GR UR: 1.02 (ref 1–1.03)
T4 FREE SERPL-MCNC: 1.04 NG/DL (ref 0.89–1.76)
TRIGL SERPL-MCNC: 152 MG/DL (ref 0–150)
TSH SERPL DL<=0.05 MIU/L-ACNC: 1.82 MIU/ML (ref 0.35–5.35)
UROBILINOGEN UR QL: NORMAL
WBC NRBC COR # BLD: 8.13 10*3/MM3 (ref 3.5–10.8)

## 2018-07-18 PROCEDURE — 80061 LIPID PANEL: CPT | Performed by: FAMILY MEDICINE

## 2018-07-18 PROCEDURE — 84439 ASSAY OF FREE THYROXINE: CPT | Performed by: FAMILY MEDICINE

## 2018-07-18 PROCEDURE — 36415 COLL VENOUS BLD VENIPUNCTURE: CPT | Performed by: FAMILY MEDICINE

## 2018-07-18 PROCEDURE — 81003 URINALYSIS AUTO W/O SCOPE: CPT | Performed by: FAMILY MEDICINE

## 2018-07-18 PROCEDURE — 80053 COMPREHEN METABOLIC PANEL: CPT | Performed by: FAMILY MEDICINE

## 2018-07-18 PROCEDURE — G0103 PSA SCREENING: HCPCS | Performed by: FAMILY MEDICINE

## 2018-07-18 PROCEDURE — 85025 COMPLETE CBC W/AUTO DIFF WBC: CPT | Performed by: FAMILY MEDICINE

## 2018-07-18 PROCEDURE — 84443 ASSAY THYROID STIM HORMONE: CPT | Performed by: FAMILY MEDICINE

## 2018-07-18 PROCEDURE — 99396 PREV VISIT EST AGE 40-64: CPT | Performed by: FAMILY MEDICINE

## 2018-07-18 PROCEDURE — 83036 HEMOGLOBIN GLYCOSYLATED A1C: CPT | Performed by: FAMILY MEDICINE

## 2018-07-18 RX ORDER — DULOXETIN HYDROCHLORIDE 60 MG/1
60 CAPSULE, DELAYED RELEASE ORAL DAILY
Qty: 30 CAPSULE | Refills: 2 | Status: SHIPPED | OUTPATIENT
Start: 2018-07-18 | End: 2018-08-21

## 2018-07-18 NOTE — PROGRESS NOTES
"Subjective   Ming Swenson is a 47 y.o. male and is here for a comprehensive physical exam. The patient reports problems - Foot pain, left hip pain, decreased mood and energy..      The following portions of the patient's history were reviewed and updated as appropriate: allergies, current medications, past family history, past medical history, past social history, past surgical history and problem list.    Allergies   Allergen Reactions   • Vioxx [Rofecoxib]        Past Medical History:   Diagnosis Date   • Acute conjunctivitis    • Disc degeneration, lumbar    • Hypertension    • Peroneal muscular atrophy    • Skin candidiasis      History reviewed. No pertinent surgical history.  Family History   Problem Relation Age of Onset   • Rheum arthritis Mother    • Diabetes Father      Social History     Social History   • Marital status:      Spouse name: N/A   • Number of children: N/A   • Years of education: N/A     Occupational History   • Not on file.     Social History Main Topics   • Smoking status: Never Smoker   • Smokeless tobacco: Never Used   • Alcohol use Yes   • Drug use: No   • Sexual activity: Not on file     Other Topics Concern   • Not on file     Social History Narrative   • No narrative on file         Objective     Review of Systems    /76   Pulse 78   Temp 98.7 °F (37.1 °C)   Resp 16   Ht 185.4 cm (73\")   Wt (!) 155 kg (342 lb)   SpO2 98%   BMI 45.12 kg/m²     Review of Systems   Constitutional: Negative for activity change and fatigue.   HENT: Positive for hearing loss and rhinorrhea. Negative for congestion and sore throat.    Respiratory: Negative for cough and shortness of breath.    Cardiovascular: Positive for leg swelling. Negative for chest pain and palpitations.   Gastrointestinal: Negative for diarrhea, nausea and vomiting.   Genitourinary: Negative for dysuria and hematuria.   Musculoskeletal: Positive for arthralgias, back pain, gait problem and joint swelling.   Skin: " Negative.    Neurological: Negative for dizziness and syncope.   Psychiatric/Behavioral: Positive for dysphoric mood. The patient is nervous/anxious.         Job stress depressed mood           Physical Exam     Physical Exam   Constitutional: He is oriented to person, place, and time. He appears well-developed and well-nourished. He is cooperative.   Morbidly obese white male in no acute distress   HENT:   Head: Normocephalic.   Right Ear: External ear normal.   Left Ear: External ear normal.   Nose: Nose normal.   Mouth/Throat: Oropharynx is clear and moist.   Eyes: Pupils are equal, round, and reactive to light. Conjunctivae are normal. No scleral icterus.   Neck: Neck supple. No JVD present. Carotid bruit is not present. No thyromegaly present.   Cardiovascular: Normal rate and regular rhythm.    Pulmonary/Chest: Effort normal and breath sounds normal. He has no wheezes. He has no rales.   Abdominal: Soft. Bowel sounds are normal. There is no hepatosplenomegaly. No hernia.   Musculoskeletal: Normal range of motion. He exhibits edema and tenderness.   Lateral foot deformities peripheral neuropathy mild crepitation of the knees pain on flexion of the left hip   Neurological: He is alert and oriented to person, place, and time.   No focal deficits no lateralizing signs   Skin: Skin is warm and dry. No rash noted.   Psychiatric: His behavior is normal. Thought content normal. Cognition and memory are normal.   Decreased mood   Nursing note and vitals reviewed.        Assessment/Plan   Healthy male exam.     1.   Patient Active Problem List   Diagnosis   • Hypertension   • Charcot-Kathleen-Tooth disease   • Morbid obesity (CMS/HCC)   • Right foot pain     2. Patient Counseling:  --Nutrition: Stressed importance of moderation in sodium/caffeine intake, saturated fat and cholesterol, caloric balance, sufficient intake of fresh fruits, vegetables, fiber, calcium, iron, and 1 mg of folate supplement per day (for females  capable of pregnancy).  --Discussed the issue of estrogen replacement, calcium supplement, and the daily use of baby aspirin.  --Exercise: Stressed the importance of regular exercise.   --Substance Abuse: Discussed cessation/primary prevention of tobacco, alcohol, or other drug use; driving or other dangerous activities under the influence; availability of treatment for abuse.    --Sexuality: Discussed sexually transmitted diseases, partner selection, use of condoms, avoidance of unintended pregnancy  and contraceptive alternatives.   --Injury prevention: Discussed safety belts, safety helmets, smoke detector, smoking near bedding or upholstery.   --Dental health: Discussed importance of regular tooth brushing, flossing, and dental visits.  --Immunizations reviewed.  --Discussed benefits of screening colonoscopy.  --After hours service discussed with patient    3. Discussed the patient's BMI with him.  The BMI is above average; BMI management plan is completed  4. Follow up in 3 months    Plans to pursue weight loss surgery, read to try Cymbalta as it helped his neuropathy and depression last time he took it.         Patrick Chan MD  07/18/2018  5:20 PM

## 2018-08-21 RX ORDER — DULOXETIN HYDROCHLORIDE 30 MG/1
30 CAPSULE, DELAYED RELEASE ORAL DAILY
Qty: 30 CAPSULE | Refills: 0 | Status: SHIPPED | OUTPATIENT
Start: 2018-08-21 | End: 2018-10-12

## 2018-08-22 ENCOUNTER — TELEPHONE (OUTPATIENT)
Dept: FAMILY MEDICINE CLINIC | Facility: CLINIC | Age: 48
End: 2018-08-22

## 2018-08-22 NOTE — TELEPHONE ENCOUNTER
LVM informing diana Arana  RMA    ----- Message from Patrick Chan MD sent at 8/21/2018  5:30 PM EDT -----  Regarding: RE: MEDS  Contact: [phone]  rx sent  ----- Message -----  From: Roberto Arana MA  Sent: 8/21/2018   3:06 PM  To: Patrick Chan MD  Subject: FW: MEDS                                         He asked if that can be sent in today to [facility] on [address].      ----- Message -----  From: Patrick Chan MD  Sent: 8/21/2018  11:48 AM  To: Roberto Arana MA  Subject: RE: MEDS                                         Should wean off slowly by switching to 30 mg then weaning off that  ----- Message -----  From: Roberto Arana MA  Sent: 8/21/2018  11:30 AM  To: Patrick Chan MD  Subject: FW: MEDS                                         Do you want him to make an appt?    ----- Message -----  From: Lulu Mcmahon  Sent: 8/21/2018  10:11 AM  To: Roberto Arana MA  Subject: MEDS                                             Patient called and said he is taking Cymbalta 60mg but doesn't want to continue and would like to know what he needs to do.  He will continue taking it until he hears back. Please call and advise [phone].  Thank you.

## 2018-09-19 ENCOUNTER — TELEPHONE (OUTPATIENT)
Dept: FAMILY MEDICINE CLINIC | Facility: CLINIC | Age: 48
End: 2018-09-19

## 2018-09-19 NOTE — TELEPHONE ENCOUNTER
LVM informing pt.   TRINY Arana  JUNIOR    ----- Message from Patrick Chan MD sent at 9/19/2018  4:53 PM EDT -----  Contact: [phone]  Take every other day 1 week and stop  ----- Message -----  From: Roberto Arana MA  Sent: 9/18/2018   4:20 PM  To: Patrick Chan MD        ----- Message -----  From: Beth Schuler  Sent: 9/18/2018   1:46 PM  To: Roberto Arana MA    Pt is asking if he could come completely off Cymbalta or should he continue to wean off. Please call and advise.

## 2018-10-12 ENCOUNTER — OFFICE VISIT (OUTPATIENT)
Dept: FAMILY MEDICINE CLINIC | Facility: CLINIC | Age: 48
End: 2018-10-12

## 2018-10-12 ENCOUNTER — TELEPHONE (OUTPATIENT)
Dept: FAMILY MEDICINE CLINIC | Facility: CLINIC | Age: 48
End: 2018-10-12

## 2018-10-12 VITALS
TEMPERATURE: 98.2 F | DIASTOLIC BLOOD PRESSURE: 88 MMHG | WEIGHT: 315 LBS | RESPIRATION RATE: 16 BRPM | SYSTOLIC BLOOD PRESSURE: 126 MMHG | OXYGEN SATURATION: 98 % | HEIGHT: 73 IN | HEART RATE: 86 BPM | BODY MASS INDEX: 41.75 KG/M2

## 2018-10-12 DIAGNOSIS — R06.09 DOE (DYSPNEA ON EXERTION): ICD-10-CM

## 2018-10-12 DIAGNOSIS — R60.0 LOCALIZED EDEMA: Primary | ICD-10-CM

## 2018-10-12 DIAGNOSIS — R73.03 PREDIABETES: ICD-10-CM

## 2018-10-12 DIAGNOSIS — I10 ESSENTIAL HYPERTENSION: ICD-10-CM

## 2018-10-12 LAB
HBA1C MFR BLD: 6.6 % (ref 4.8–5.6)
TESTOST SERPL-MCNC: 373.11 NG/DL (ref 123.06–813.86)

## 2018-10-12 PROCEDURE — 99214 OFFICE O/P EST MOD 30 MIN: CPT | Performed by: FAMILY MEDICINE

## 2018-10-12 PROCEDURE — 36415 COLL VENOUS BLD VENIPUNCTURE: CPT | Performed by: FAMILY MEDICINE

## 2018-10-12 PROCEDURE — 83036 HEMOGLOBIN GLYCOSYLATED A1C: CPT | Performed by: FAMILY MEDICINE

## 2018-10-12 PROCEDURE — 84403 ASSAY OF TOTAL TESTOSTERONE: CPT | Performed by: FAMILY MEDICINE

## 2018-10-12 RX ORDER — CEFDINIR 300 MG/1
CAPSULE ORAL
Qty: 20 CAPSULE | Refills: 0 | Status: SHIPPED | OUTPATIENT
Start: 2018-10-12 | End: 2018-11-02

## 2018-10-12 RX ORDER — TRIAMTERENE AND HYDROCHLOROTHIAZIDE 37.5; 25 MG/1; MG/1
1 TABLET ORAL DAILY
Qty: 90 TABLET | Refills: 1 | Status: SHIPPED | OUTPATIENT
Start: 2018-10-12 | End: 2019-04-17 | Stop reason: SDUPTHER

## 2018-10-12 RX ORDER — BENAZEPRIL HYDROCHLORIDE 10 MG/1
10 TABLET ORAL DAILY
Qty: 90 TABLET | Refills: 1 | Status: SHIPPED | OUTPATIENT
Start: 2018-10-12 | End: 2019-02-08 | Stop reason: SDUPTHER

## 2018-10-12 NOTE — PROGRESS NOTES
Subjective   Ming Swenson is a 48 y.o. male.     Cough   This is a new problem. The current episode started in the past 7 days. The problem has been unchanged. The problem occurs every few minutes. The cough is productive of sputum. Associated symptoms include shortness of breath. Pertinent negatives include no chest pain, chills, fever, headaches, rash or sore throat. The symptoms are aggravated by exercise. He has tried OTC cough suppressant for the symptoms. The treatment provided no relief. His past medical history is significant for bronchitis. obesity   Shortness of Breath   This is a new problem. The current episode started 1 to 4 weeks ago. The problem occurs daily. The problem has been unchanged. Associated symptoms include leg swelling. Pertinent negatives include no chest pain, fever, headaches, rash, sore throat or vomiting. Treatments tried: diuretcis. The treatment provided mild relief. (Obesity)   Leg Swelling   This is a chronic (Swelling down after sleeping ) problem. The current episode started more than 1 month ago. The problem occurs daily. The problem has been unchanged. Associated symptoms include arthralgias and coughing. Pertinent negatives include no chest pain, chills, congestion, fatigue, fever, headaches, nausea, rash, sore throat, vomiting or weakness. The symptoms are aggravated by standing. He has tried position changes for the symptoms. The treatment provided no relief.   Hypertension   This is a chronic problem. The current episode started more than 1 year ago. The problem is unchanged. The problem is controlled. Associated symptoms include peripheral edema and shortness of breath. Pertinent negatives include no chest pain, headaches or palpitations. Risk factors for coronary artery disease include male gender, obesity and sedentary lifestyle. Past treatments include ACE inhibitors. Current antihypertension treatment includes ACE inhibitors, diuretics and lifestyle changes. The  "current treatment provides moderate improvement. Compliance problems include diet.  There is no history of angina, kidney disease or CAD/MI.        The following portions of the patient's history were reviewed and updated as appropriate: allergies, current medications, past social history and problem list.    Review of Systems   Constitutional: Negative for chills, fatigue and fever.   HENT: Negative for congestion and sore throat.    Respiratory: Positive for cough and shortness of breath.    Cardiovascular: Positive for leg swelling. Negative for chest pain and palpitations.   Gastrointestinal: Negative for diarrhea, nausea and vomiting.   Genitourinary: Negative for dysuria and hematuria.   Musculoskeletal: Positive for arthralgias. Negative for gait problem.   Skin: Negative for color change and rash.   Neurological: Negative for weakness and headaches.   Psychiatric/Behavioral: Negative for agitation. The patient is nervous/anxious.        Objective   /88   Pulse 86   Temp 98.2 °F (36.8 °C)   Resp 16   Ht 185.4 cm (73\")   Wt (!) 190 kg (419 lb)   SpO2 98%   BMI 55.28 kg/m²   Physical Exam   Constitutional: He is oriented to person, place, and time. He appears well-developed and well-nourished. He is cooperative.   HENT:   Head: Normocephalic.   Right Ear: External ear normal.   Left Ear: External ear normal.   Nose: Nose normal.   Mouth/Throat: Oropharynx is clear and moist.   Eyes: Pupils are equal, round, and reactive to light. Conjunctivae are normal. No scleral icterus.   Neck: Neck supple. No JVD present. Carotid bruit is not present. No thyromegaly present.   Cardiovascular: Normal rate, regular rhythm and normal heart sounds.  Exam reveals no gallop and no friction rub.    No murmur heard.  Pulmonary/Chest: Effort normal and breath sounds normal.   Abdominal: There is no hepatosplenomegaly.   Musculoskeletal: Normal range of motion. He exhibits edema.   Neurological: He is alert and oriented " to person, place, and time.   No focal deficits no lateralizing signs   Skin: Skin is warm and dry. No rash noted.   Psychiatric: He has a normal mood and affect. Cognition and memory are normal.   Nursing note and vitals reviewed.      Assessment/Plan   Problem List Items Addressed This Visit     Hypertension    Relevant Medications    triamterene-hydrochlorothiazide (MAXZIDE-25) 37.5-25 MG per tablet    benazepril (LOTENSIN) 10 MG tablet    Other Relevant Orders    Testosterone      Other Visit Diagnoses     Localized edema    -  Primary    Relevant Orders    Testosterone    Prediabetes        Relevant Orders    Hemoglobin A1c    MAXWELL (dyspnea on exertion)              New Medications Ordered This Visit   Medications   • triamterene-hydrochlorothiazide (MAXZIDE-25) 37.5-25 MG per tablet     Sig: Take 1 tablet by mouth Daily.     Dispense:  90 tablet     Refill:  1   • benazepril (LOTENSIN) 10 MG tablet     Sig: Take 1 tablet by mouth Daily.     Dispense:  90 tablet     Refill:  1   • cefdinir (OMNICEF) 300 MG capsule     Sig: Take 2 daily     Dispense:  20 capsule     Refill:  0

## 2018-10-12 NOTE — TELEPHONE ENCOUNTER
----- Message from Patrick Chan MD sent at 10/11/2018  5:15 PM EDT -----  Regarding: RE: INCREASE OF MEDICATION  Contact: [phone]  Yes ok to try  ----- Message -----  From: Roberto Arana MA  Sent: 10/11/2018  11:02 AM  To: Patrick Chan MD  Subject: FW: INCREASE OF MEDICATION                           ----- Message -----  From: Krystin Abarca RegSched Rep  Sent: 10/11/2018   8:25 AM  To: Roberto Arana MA  Subject: INCREASE OF MEDICATION                           PT CALLED TO SEE IF HIS WATER PILL CAN BE INCREASED FROM HALF A PILL TO FULL PILL, LEGS STILL SWOLLEN

## 2018-10-15 RX ORDER — METFORMIN HYDROCHLORIDE 500 MG/1
500 TABLET, EXTENDED RELEASE ORAL
Qty: 30 TABLET | Refills: 2 | Status: SHIPPED | OUTPATIENT
Start: 2018-10-15 | End: 2019-01-19 | Stop reason: SDUPTHER

## 2018-10-25 RX ORDER — TRIAMTERENE AND HYDROCHLOROTHIAZIDE 37.5; 25 MG/1; MG/1
0.5 TABLET ORAL DAILY
Qty: 15 TABLET | Refills: 2 | OUTPATIENT
Start: 2018-10-25

## 2018-10-26 ENCOUNTER — HOSPITAL ENCOUNTER (OUTPATIENT)
Dept: CARDIOLOGY | Facility: HOSPITAL | Age: 48
Discharge: HOME | End: 2018-10-26
Attending: FAMILY MEDICINE

## 2018-10-26 DIAGNOSIS — R06.09 DOE (DYSPNEA ON EXERTION): ICD-10-CM

## 2018-10-26 LAB
BH CV NUCLEAR PRIOR STUDY: 2
BH CV STRESS BP STAGE 1: NORMAL
BH CV STRESS BP STAGE 3: NORMAL
BH CV STRESS COMMENTS STAGE 1: NORMAL
BH CV STRESS DOSE REGADENOSON STAGE 1: 0.4
BH CV STRESS DURATION MIN STAGE 1: 1
BH CV STRESS DURATION MIN STAGE 2: 1
BH CV STRESS DURATION MIN STAGE 3: 1
BH CV STRESS DURATION MIN STAGE 4: 1
BH CV STRESS DURATION SEC STAGE 1: 0
BH CV STRESS DURATION SEC STAGE 2: 0
BH CV STRESS DURATION SEC STAGE 3: 0
BH CV STRESS DURATION SEC STAGE 4: 0
BH CV STRESS HR STAGE 1: 123
BH CV STRESS HR STAGE 2: 112
BH CV STRESS HR STAGE 3: 111
BH CV STRESS HR STAGE 4: 105
BH CV STRESS O2 STAGE 3: 95
BH CV STRESS O2 STAGE 4: 96
BH CV STRESS PROTOCOL 1: NORMAL
BH CV STRESS RECOVERY BP: NORMAL MMHG
BH CV STRESS RECOVERY HR: 103 BPM
BH CV STRESS RECOVERY O2: 97 %
BH CV STRESS STAGE 1: 1
BH CV STRESS STAGE 2: 2
BH CV STRESS STAGE 3: 3
BH CV STRESS STAGE 4: 4
LV EF NUC BP: 79 %
MAXIMAL PREDICTED HEART RATE: 174 BPM
PERCENT MAX PREDICTED HR: 70.11 %
STRESS BASELINE BP: NORMAL MMHG
STRESS BASELINE HR: 89 BPM
STRESS O2 SAT REST: 96 %
STRESS PERCENT HR: 82 %
STRESS POST ESTIMATED WORKLOAD: 1 METS
STRESS POST EXERCISE DUR MIN: 4 MIN
STRESS POST EXERCISE DUR SEC: 0 SEC
STRESS POST O2 SAT PEAK: 96 %
STRESS POST PEAK BP: NORMAL MMHG
STRESS POST PEAK HR: 122 BPM
STRESS TARGET HR: 148 BPM

## 2018-10-26 PROCEDURE — 93017 CV STRESS TEST TRACING ONLY: CPT

## 2018-10-26 PROCEDURE — 93018 CV STRESS TEST I&R ONLY: CPT | Performed by: INTERNAL MEDICINE

## 2018-10-26 PROCEDURE — 78452 HT MUSCLE IMAGE SPECT MULT: CPT

## 2018-10-26 PROCEDURE — A9500 TC99M SESTAMIBI: HCPCS | Performed by: FAMILY MEDICINE

## 2018-10-26 PROCEDURE — 0 TECHNETIUM SESTAMIBI: Performed by: FAMILY MEDICINE

## 2018-10-26 PROCEDURE — 78452 HT MUSCLE IMAGE SPECT MULT: CPT | Performed by: INTERNAL MEDICINE

## 2018-10-26 PROCEDURE — 25010000002 REGADENOSON 0.4 MG/5ML SOLUTION: Performed by: FAMILY MEDICINE

## 2018-10-26 RX ADMIN — TECHNETIUM TC 99M SESTAMIBI 1 DOSE: 1 INJECTION INTRAVENOUS at 08:50

## 2018-10-26 RX ADMIN — REGADENOSON 0.4 MG: 0.08 INJECTION, SOLUTION INTRAVENOUS at 10:52

## 2018-10-26 RX ADMIN — TECHNETIUM TC 99M SESTAMIBI 1 DOSE: 1 INJECTION INTRAVENOUS at 10:55

## 2018-11-02 ENCOUNTER — OFFICE VISIT (OUTPATIENT)
Dept: FAMILY MEDICINE CLINIC | Facility: CLINIC | Age: 48
End: 2018-11-02

## 2018-11-02 VITALS
SYSTOLIC BLOOD PRESSURE: 120 MMHG | RESPIRATION RATE: 16 BRPM | WEIGHT: 315 LBS | TEMPERATURE: 97.9 F | BODY MASS INDEX: 41.75 KG/M2 | HEART RATE: 87 BPM | OXYGEN SATURATION: 98 % | DIASTOLIC BLOOD PRESSURE: 86 MMHG | HEIGHT: 73 IN

## 2018-11-02 DIAGNOSIS — R60.0 LOCALIZED EDEMA: Primary | ICD-10-CM

## 2018-11-02 DIAGNOSIS — E66.01 MORBID OBESITY (HCC): ICD-10-CM

## 2018-11-02 PROCEDURE — 99213 OFFICE O/P EST LOW 20 MIN: CPT | Performed by: FAMILY MEDICINE

## 2018-11-02 NOTE — PROGRESS NOTES
Subjective   Ming Swenson is a 48 y.o. male.     Leg Swelling   This is a chronic problem. The current episode started more than 1 month ago. The problem has been gradually improving. Pertinent negatives include no abdominal pain, arthralgias, chest pain, chills, congestion, coughing, fever, headaches, joint swelling, nausea, rash, sore throat or vomiting. The symptoms are aggravated by standing (sits with legs down all day). Treatments tried: diuretic leg elevation weight loss. The treatment provided moderate relief.   Obesity   This is a chronic problem. The current episode started more than 1 year ago. The problem occurs constantly. The problem has been unchanged. Pertinent negatives include no abdominal pain, arthralgias, chest pain, chills, congestion, coughing, fever, headaches, joint swelling, nausea, rash, sore throat or vomiting. The treatment provided mild relief.        The following portions of the patient's history were reviewed and updated as appropriate: allergies, current medications, past social history and problem list.    Review of Systems   Constitutional: Negative for activity change, chills, fever and unexpected weight change.   HENT: Negative for congestion and sore throat.    Respiratory: Negative for cough and shortness of breath.    Cardiovascular: Positive for leg swelling. Negative for chest pain and palpitations.   Gastrointestinal: Negative for abdominal pain, diarrhea, nausea and vomiting.   Genitourinary: Negative for dysuria and hematuria.   Musculoskeletal: Negative for arthralgias and joint swelling.   Skin: Negative for color change and rash.   Allergic/Immunologic: Negative for environmental allergies and food allergies.   Neurological: Negative for syncope and headaches.   Hematological: Negative for adenopathy. Does not bruise/bleed easily.   Psychiatric/Behavioral: Negative for dysphoric mood and sleep disturbance. The patient is not nervous/anxious.        Objective   BP  "120/86 (BP Location: Left arm, Patient Position: Sitting, Cuff Size: Large Adult)   Pulse 87   Temp 97.9 °F (36.6 °C) (Temporal Artery )   Resp 16   Ht 185.4 cm (73\")   Wt (!) 187 kg (412 lb)   SpO2 98%   BMI 54.36 kg/m²   Physical Exam   Constitutional: He is oriented to person, place, and time. He appears well-developed and well-nourished. He is cooperative.   HENT:   Head: Normocephalic.   Right Ear: External ear normal.   Left Ear: External ear normal.   Nose: Nose normal.   Mouth/Throat: Oropharynx is clear and moist.   Eyes: Pupils are equal, round, and reactive to light. Conjunctivae are normal. No scleral icterus.   Neck: Neck supple. Carotid bruit is not present. No thyromegaly present.   Cardiovascular: Normal rate and regular rhythm.    Pulmonary/Chest: Effort normal and breath sounds normal.   Abdominal: Soft. Bowel sounds are normal. He exhibits distension. There is no hepatosplenomegaly.   Musculoskeletal: Normal range of motion. He exhibits edema.   Neurological: He is alert and oriented to person, place, and time.   No focal deficits no lateralizing signs   Skin: Skin is warm and dry. No rash noted.   Psychiatric: He has a normal mood and affect. Cognition and memory are normal.   Nursing note and vitals reviewed.      Assessment/Plan   Problem List Items Addressed This Visit     Morbid obesity (CMS/HCC)      Other Visit Diagnoses     Localized edema    -  Primary          No orders of the defined types were placed in this encounter.    Stress test normal ok to pursue weight loss surgery again.         "

## 2019-01-21 RX ORDER — METFORMIN HYDROCHLORIDE 500 MG/1
TABLET, EXTENDED RELEASE ORAL
Qty: 30 TABLET | Refills: 2 | Status: SHIPPED | OUTPATIENT
Start: 2019-01-21 | End: 2019-04-17 | Stop reason: SDUPTHER

## 2019-02-08 ENCOUNTER — OFFICE VISIT (OUTPATIENT)
Dept: FAMILY MEDICINE CLINIC | Facility: CLINIC | Age: 49
End: 2019-02-08

## 2019-02-08 VITALS
HEART RATE: 89 BPM | OXYGEN SATURATION: 97 % | DIASTOLIC BLOOD PRESSURE: 84 MMHG | WEIGHT: 315 LBS | SYSTOLIC BLOOD PRESSURE: 136 MMHG | RESPIRATION RATE: 16 BRPM | HEIGHT: 73 IN | BODY MASS INDEX: 41.75 KG/M2

## 2019-02-08 DIAGNOSIS — R73.03 PREDIABETES: Primary | ICD-10-CM

## 2019-02-08 DIAGNOSIS — I10 ESSENTIAL HYPERTENSION: ICD-10-CM

## 2019-02-08 LAB — HBA1C MFR BLD: 6.1 %

## 2019-02-08 PROCEDURE — 83036 HEMOGLOBIN GLYCOSYLATED A1C: CPT | Performed by: FAMILY MEDICINE

## 2019-02-08 PROCEDURE — 99213 OFFICE O/P EST LOW 20 MIN: CPT | Performed by: FAMILY MEDICINE

## 2019-02-08 RX ORDER — BENAZEPRIL HYDROCHLORIDE 10 MG/1
10 TABLET ORAL DAILY
Qty: 90 TABLET | Refills: 1 | Status: SHIPPED | OUTPATIENT
Start: 2019-02-08 | End: 2019-07-14 | Stop reason: SDUPTHER

## 2019-02-10 NOTE — PROGRESS NOTES
Subjective   Ming Swenson is a 48 y.o. male.     Hypertension   This is a chronic problem. The current episode started more than 1 year ago. The problem is unchanged. The problem is controlled. Associated symptoms include peripheral edema. Pertinent negatives include no chest pain, headaches, palpitations or shortness of breath. Risk factors for coronary artery disease include male gender, obesity and sedentary lifestyle. Past treatments include ACE inhibitors. Current antihypertension treatment includes ACE inhibitors, diuretics and lifestyle changes. The current treatment provides moderate improvement. Compliance problems include diet.  There is no history of angina, kidney disease or CAD/MI.   Diabetes   He presents for his follow-up diabetic visit. He has type 2 diabetes mellitus. His disease course has been improving. There are no hypoglycemic associated symptoms. Pertinent negatives for hypoglycemia include no dizziness or headaches. There are no diabetic associated symptoms. Pertinent negatives for diabetes include no chest pain, no fatigue and no weakness. There are no hypoglycemic complications. Symptoms are stable. There are no diabetic complications. Risk factors for coronary artery disease include obesity, male sex, diabetes mellitus and sedentary lifestyle. Current diabetic treatment includes oral agent (monotherapy). He is compliant with treatment most of the time. He participates in exercise intermittently. An ACE inhibitor/angiotensin II receptor blocker is being taken. Eye exam is current.        The following portions of the patient's history were reviewed and updated as appropriate: allergies, current medications, past social history and problem list.    Review of Systems   Constitutional: Negative for fatigue and unexpected weight change.   HENT: Negative for congestion and sore throat.    Respiratory: Negative for cough, chest tightness and shortness of breath.    Cardiovascular: Positive for  "leg swelling. Negative for chest pain and palpitations.   Gastrointestinal: Negative for diarrhea, nausea and vomiting.   Musculoskeletal: Positive for arthralgias. Negative for myalgias.   Skin: Negative for color change and rash.   Neurological: Negative for dizziness, syncope, weakness and headaches.       Objective   /84   Pulse 89   Resp 16   Ht 185.4 cm (73\")   Wt (!) 187 kg (411 lb 12.8 oz)   SpO2 97%   BMI 54.33 kg/m²   Physical Exam   Constitutional: He is oriented to person, place, and time. He appears well-developed and well-nourished. He is cooperative.   HENT:   Head: Normocephalic.   Right Ear: External ear normal.   Left Ear: External ear normal.   Nose: Nose normal.   Mouth/Throat: Oropharynx is clear and moist.   Eyes: Conjunctivae are normal. Pupils are equal, round, and reactive to light. No scleral icterus.   Neck: Neck supple. No JVD present. Carotid bruit is not present. No thyromegaly present.   Cardiovascular: Normal rate, regular rhythm and normal heart sounds.   Pulmonary/Chest: Effort normal and breath sounds normal. He has no wheezes. He has no rales.   Abdominal: There is no hepatosplenomegaly.   Musculoskeletal: Normal range of motion. He exhibits no edema or tenderness.   Neurological: He is alert and oriented to person, place, and time.   No focal deficits no lateralizing signs   Skin: Skin is warm and dry. No rash noted.   Psychiatric: He has a normal mood and affect. Cognition and memory are normal.   Nursing note and vitals reviewed.      Assessment/Plan   Problem List Items Addressed This Visit        Active Problems    Hypertension    Relevant Medications    benazepril (LOTENSIN) 10 MG tablet      Other Visit Diagnoses     Prediabetes    -  Primary    Relevant Orders    POC Glycosylated Hemoglobin (Hb A1C) (Completed)          New Medications Ordered This Visit   Medications   • benazepril (LOTENSIN) 10 MG tablet     Sig: Take 1 tablet by mouth Daily.     Dispense:  " 90 tablet     Refill:  1       Continue diet and still considering gastric sleeve.

## 2019-04-17 RX ORDER — METFORMIN HYDROCHLORIDE 500 MG/1
TABLET, EXTENDED RELEASE ORAL
Qty: 30 TABLET | Refills: 0 | Status: SHIPPED | OUTPATIENT
Start: 2019-04-17 | End: 2019-07-08 | Stop reason: SDUPTHER

## 2019-04-17 RX ORDER — TRIAMTERENE AND HYDROCHLOROTHIAZIDE 37.5; 25 MG/1; MG/1
TABLET ORAL
Qty: 90 TABLET | Refills: 1 | Status: SHIPPED | OUTPATIENT
Start: 2019-04-17 | End: 2019-07-10 | Stop reason: SDUPTHER

## 2019-07-09 RX ORDER — METFORMIN HYDROCHLORIDE 500 MG/1
TABLET, EXTENDED RELEASE ORAL
Qty: 30 TABLET | Refills: 1 | Status: SHIPPED | OUTPATIENT
Start: 2019-07-09 | End: 2019-07-16

## 2019-07-10 RX ORDER — TRIAMTERENE AND HYDROCHLOROTHIAZIDE 37.5; 25 MG/1; MG/1
1 TABLET ORAL DAILY
Qty: 90 TABLET | Refills: 0 | Status: SHIPPED | OUTPATIENT
Start: 2019-07-10 | End: 2019-08-01 | Stop reason: SDUPTHER

## 2019-07-14 DIAGNOSIS — I10 ESSENTIAL HYPERTENSION: ICD-10-CM

## 2019-07-15 RX ORDER — METFORMIN HYDROCHLORIDE 500 MG/1
TABLET, EXTENDED RELEASE ORAL
Qty: 30 TABLET | Refills: 2 | Status: SHIPPED | OUTPATIENT
Start: 2019-07-15 | End: 2019-07-16

## 2019-07-15 RX ORDER — BENAZEPRIL HYDROCHLORIDE 10 MG/1
TABLET ORAL
Qty: 90 TABLET | Refills: 0 | Status: SHIPPED | OUTPATIENT
Start: 2019-07-15 | End: 2019-08-01 | Stop reason: SDUPTHER

## 2019-07-16 RX ORDER — METFORMIN HYDROCHLORIDE 500 MG/1
500 TABLET, EXTENDED RELEASE ORAL
Qty: 30 TABLET | Refills: 1 | Status: SHIPPED | OUTPATIENT
Start: 2019-07-16 | End: 2019-08-01 | Stop reason: SDUPTHER

## 2019-08-01 ENCOUNTER — OFFICE VISIT (OUTPATIENT)
Dept: FAMILY MEDICINE CLINIC | Facility: CLINIC | Age: 49
End: 2019-08-01

## 2019-08-01 VITALS
OXYGEN SATURATION: 96 % | DIASTOLIC BLOOD PRESSURE: 88 MMHG | HEART RATE: 84 BPM | WEIGHT: 315 LBS | HEIGHT: 73 IN | RESPIRATION RATE: 16 BRPM | BODY MASS INDEX: 41.75 KG/M2 | SYSTOLIC BLOOD PRESSURE: 136 MMHG

## 2019-08-01 DIAGNOSIS — E11.9 CONTROLLED TYPE 2 DIABETES MELLITUS WITHOUT COMPLICATION, UNSPECIFIED WHETHER LONG TERM INSULIN USE (HCC): Primary | ICD-10-CM

## 2019-08-01 DIAGNOSIS — I10 ESSENTIAL HYPERTENSION: ICD-10-CM

## 2019-08-01 DIAGNOSIS — M25.571 CHRONIC PAIN OF RIGHT ANKLE: ICD-10-CM

## 2019-08-01 DIAGNOSIS — G89.29 CHRONIC PAIN OF RIGHT ANKLE: ICD-10-CM

## 2019-08-01 LAB — HBA1C MFR BLD: 6.1 %

## 2019-08-01 PROCEDURE — 83036 HEMOGLOBIN GLYCOSYLATED A1C: CPT | Performed by: FAMILY MEDICINE

## 2019-08-01 PROCEDURE — 99213 OFFICE O/P EST LOW 20 MIN: CPT | Performed by: FAMILY MEDICINE

## 2019-08-01 RX ORDER — METFORMIN HYDROCHLORIDE 500 MG/1
500 TABLET, EXTENDED RELEASE ORAL
Qty: 90 TABLET | Refills: 1 | Status: SHIPPED | OUTPATIENT
Start: 2019-08-01 | End: 2020-04-23 | Stop reason: SDUPTHER

## 2019-08-01 RX ORDER — TRIAMTERENE AND HYDROCHLOROTHIAZIDE 37.5; 25 MG/1; MG/1
1 TABLET ORAL DAILY
Qty: 90 TABLET | Refills: 1 | Status: SHIPPED | OUTPATIENT
Start: 2019-08-01 | End: 2020-04-23 | Stop reason: SDUPTHER

## 2019-08-01 RX ORDER — BENAZEPRIL HYDROCHLORIDE 10 MG/1
10 TABLET ORAL DAILY
Qty: 90 TABLET | Refills: 1 | Status: SHIPPED | OUTPATIENT
Start: 2019-08-01 | End: 2020-04-23 | Stop reason: SDUPTHER

## 2019-08-02 NOTE — PROGRESS NOTES
Subjective   Ming Swenson is a 48 y.o. male.     Hypertension   This is a chronic problem. The current episode started more than 1 year ago. The problem is unchanged. The problem is controlled. Associated symptoms include peripheral edema. Pertinent negatives include no chest pain, headaches, palpitations or shortness of breath. Risk factors for coronary artery disease include male gender, obesity, sedentary lifestyle and diabetes mellitus. Past treatments include ACE inhibitors and diuretics. Current antihypertension treatment includes ACE inhibitors, diuretics and lifestyle changes. The current treatment provides significant improvement. Compliance problems include diet and exercise.  There is no history of angina, kidney disease or CAD/MI.   Diabetes   He presents for his follow-up (Is still considering weight loss surgery) diabetic visit. He has type 2 diabetes mellitus. His disease course has been stable. There are no hypoglycemic associated symptoms. Pertinent negatives for hypoglycemia include no dizziness or headaches. There are no diabetic associated symptoms. Pertinent negatives for diabetes include no chest pain, no fatigue, no polydipsia, no polyphagia, no polyuria and no weakness. There are no hypoglycemic complications. Symptoms are stable. There are no diabetic complications. Risk factors for coronary artery disease include obesity, male sex, diabetes mellitus and sedentary lifestyle. Current diabetic treatment includes oral agent (monotherapy). He is compliant with treatment most of the time. He participates in exercise intermittently. An ACE inhibitor/angiotensin II receptor blocker is being taken. Eye exam is current.        The following portions of the patient's history were reviewed and updated as appropriate: allergies, current medications, past social history and problem list.    Review of Systems   Constitutional: Negative for activity change, appetite change, diaphoresis, fatigue and  "unexpected weight change.   Eyes: Negative for pain and visual disturbance.   Respiratory: Negative for chest tightness and shortness of breath.    Cardiovascular: Negative for chest pain, palpitations and leg swelling.   Gastrointestinal: Negative for diarrhea, nausea and vomiting.   Endocrine: Negative for polydipsia, polyphagia and polyuria.   Genitourinary: Negative for dysuria and hematuria.   Musculoskeletal: Positive for arthralgias.        Having severe problems walking with right ankle   Skin: Negative for color change.   Neurological: Negative for dizziness, syncope, weakness, light-headedness, numbness and headaches.       Objective   /88   Pulse 84   Resp 16   Ht 185.4 cm (73\")   Wt (!) 191 kg (421 lb)   SpO2 96%   BMI 55.54 kg/m²   Physical Exam   Constitutional: He is oriented to person, place, and time. He appears well-developed and well-nourished. He is cooperative.   HENT:   Head: Normocephalic and atraumatic.   Eyes: Conjunctivae are normal. Pupils are equal, round, and reactive to light. No scleral icterus.   Neck: Neck supple. Carotid bruit is not present. No thyromegaly present.   Cardiovascular: Normal rate and regular rhythm.   Pulmonary/Chest: Effort normal and breath sounds normal.   Abdominal: There is no hepatosplenomegaly.   Musculoskeletal: Normal range of motion. He exhibits edema and tenderness.   Significant deformity of the right ankle   Neurological: He is alert and oriented to person, place, and time.   No focal deficits no lateralizing signs   Skin: Skin is warm and dry. No rash noted.   Psychiatric: He has a normal mood and affect. Cognition and memory are normal.   Nursing note and vitals reviewed.      Assessment/Plan   Problem List Items Addressed This Visit        Active Problems    Hypertension    Relevant Medications    benazepril (LOTENSIN) 10 MG tablet    triamterene-hydrochlorothiazide (MAXZIDE-25) 37.5-25 MG per tablet      Other Visit Diagnoses     " Controlled type 2 diabetes mellitus without complication, unspecified whether long term insulin use (CMS/[facility])    -  Primary    Relevant Medications    metFORMIN ER (GLUCOPHAGE-XR) 500 MG 24 hr tablet    Other Relevant Orders    POC Glycosylated Hemoglobin (Hb A1C) (Completed)    Chronic pain of right ankle              New Medications Ordered This Visit   Medications   • benazepril (LOTENSIN) 10 MG tablet     Sig: Take 1 tablet by mouth Daily.     Dispense:  90 tablet     Refill:  1     DX Code Needed  PT REQUESTING REFILL.   • metFORMIN ER (GLUCOPHAGE-XR) 500 MG 24 hr tablet     Sig: Take 1 tablet by mouth Daily With Breakfast.     Dispense:  90 tablet     Refill:  1   • triamterene-hydrochlorothiazide (MAXZIDE-25) 37.5-25 MG per tablet     Sig: Take 1 tablet by mouth Daily.     Dispense:  90 tablet     Refill:  1     Patient is encouraged to pursue weight loss surgery, continue to follow a diabetic weight reducing diet.  He needs surgery on the right ankle but needs to lose weight first.

## 2019-08-12 ENCOUNTER — OFFICE VISIT (OUTPATIENT)
Dept: FAMILY MEDICINE CLINIC | Facility: CLINIC | Age: 49
End: 2019-08-12

## 2019-08-12 VITALS
SYSTOLIC BLOOD PRESSURE: 166 MMHG | DIASTOLIC BLOOD PRESSURE: 84 MMHG | WEIGHT: 315 LBS | TEMPERATURE: 97.3 F | RESPIRATION RATE: 18 BRPM | BODY MASS INDEX: 41.75 KG/M2 | HEIGHT: 73 IN | HEART RATE: 84 BPM

## 2019-08-12 DIAGNOSIS — H66.92 LEFT ACUTE OTITIS MEDIA: ICD-10-CM

## 2019-08-12 DIAGNOSIS — H60.502 ACUTE OTITIS EXTERNA OF LEFT EAR, UNSPECIFIED TYPE: Primary | ICD-10-CM

## 2019-08-12 PROCEDURE — 99213 OFFICE O/P EST LOW 20 MIN: CPT | Performed by: NURSE PRACTITIONER

## 2019-08-12 RX ORDER — AMOXICILLIN 875 MG/1
875 TABLET, COATED ORAL 2 TIMES DAILY
Qty: 14 TABLET | Refills: 0 | Status: SHIPPED | OUTPATIENT
Start: 2019-08-12 | End: 2019-08-19

## 2019-08-12 RX ORDER — OFLOXACIN 3 MG/ML
5 SOLUTION AURICULAR (OTIC) DAILY
Qty: 10 ML | Refills: 0 | Status: SHIPPED | OUTPATIENT
Start: 2019-08-12 | End: 2021-07-29

## 2019-08-12 NOTE — PATIENT INSTRUCTIONS
Otitis Media, Adult    Otitis media occurs when there is inflammation and fluid in the middle ear. Your middle ear is a part of the ear that contains bones for hearing as well as air that helps send sounds to your brain.  What are the causes?  This condition is caused by a blockage in the eustachian tube. This tube drains fluid from the ear to the back of the nose (nasopharynx). A blockage in this tube can be caused by an object or by swelling (edema) in the tube. Problems that can cause a blockage include:  · A cold or other upper respiratory infection.  · Allergies.  · An irritant, such as tobacco smoke.  · Enlarged adenoids. The adenoids are areas of soft tissue located high in the back of the throat, behind the nose and the roof of the mouth.  · A mass in the nasopharynx.  · Damage to the ear caused by pressure changes (barotrauma).  What are the signs or symptoms?  Symptoms of this condition include:  · Ear pain.  · A fever.  · Decreased hearing.  · A headache.  · Tiredness (lethargy).  · Fluid leaking from the ear.  · Ringing in the ear.  How is this diagnosed?  This condition is diagnosed with a physical exam. During the exam your health care provider will use an instrument called an otoscope to look into your ear and check for redness, swelling, and fluid. He or she will also ask about your symptoms.  Your health care provider may also order tests, such as:  · A test to check the movement of the eardrum (pneumatic otoscopy). This test is done by squeezing a small amount of air into the ear.  · A test that changes air pressure in the middle ear to check how well the eardrum moves and whether the eustachian tube is working (tympanogram).  How is this treated?  This condition usually goes away on its own within 3-5 days. But if the condition is caused by a bacteria infection and does not go away own its own, or keeps coming back, your health care provider may:  · Prescribe antibiotic medicines to treat the  "infection.  · Prescribe or recommend medicines to control pain.  Follow these instructions at home:  · Take over-the-counter and prescription medicines only as told by your health care provider.  · If you were prescribed an antibiotic medicine, take it as told by your health care provider. Do not stop taking the antibiotic even if you start to feel better.  · Keep all follow-up visits as told by your health care provider. This is important.  Contact a health care provider if:  · You have bleeding from your nose.  · There is a lump on your neck.  · You are not getting better in 5 days.  · You feel worse instead of better.  Get help right away if:  · You have severe pain that is not controlled with medicine.  · You have swelling, redness, or pain around your ear.  · You have stiffness in your neck.  · A part of your face is paralyzed.  · The bone behind your ear (mastoid) is tender when you touch it.  · You develop a severe headache.  Summary  · Otitis media is redness, soreness, and swelling of the middle ear.  · This condition usually goes away on its own within 3-5 days.  · If the problem does not go away in 3-5 days, your health care provider may prescribe or recommend medicines to treat your symptoms.  · If you were prescribed an antibiotic medicine, take it as told by your health care provider.  This information is not intended to replace advice given to you by your health care provider. Make sure you discuss any questions you have with your health care provider.  Document Released: 09/22/2005 Document Revised: 12/08/2017 Document Reviewed: 12/08/2017  [employer] Interactive Patient Education © 2019 [employer]  Otitis Externa    Otitis externa is an infection of the outer ear canal. The outer ear canal is the area between the outside of the ear and the eardrum. Otitis externa is sometimes called \"swimmer's ear.\"  Follow these instructions at home:  · If you were given antibiotic ear drops, use them as told by " your doctor. Do not stop using them even if your condition gets better.  · Take over-the-counter and prescription medicines only as told by your doctor.  · Keep all follow-up visits as told by your doctor. This is important.  How is this prevented?  · Keep your ear dry. Use the corner of a towel to dry your ear after you swim or bathe.  · Try not to scratch or put things in your ear. Doing these things makes it easier for germs to grow in your ear.  · Avoid swimming in lakes, dirty water, or pools that may not have the right amount of a chemical called chlorine.  · Consider making ear drops and putting 3 or 4 drops in each ear after you swim. Ask your doctor about how you can make ear drops.  Contact a doctor if:  · You have a fever.  · After 3 days your ear is still red, swollen, or painful.  · After 3 days you still have pus coming from your ear.  · Your redness, swelling, or pain gets worse.  · You have a really bad headache.  · You have redness, swelling, pain, or tenderness behind your ear.  This information is not intended to replace advice given to you by your health care provider. Make sure you discuss any questions you have with your health care provider.  Document Released: 06/05/2009 Document Revised: 01/12/2017 Document Reviewed: 09/26/2016  [employer] Interactive Patient Education © 2019 [employer].

## 2019-08-12 NOTE — PROGRESS NOTES
"Subjective   Ming Swenson is a 48 y.o. male.   Chief Complaint   Patient presents with   • Earache     Left earache. Pt states that he just got back from the beach.       History of Present Illness   Patient is here with left ear pain. Started on Saturday. Has been to the beach swimming.   He has been taking tylenol of pain. Denies fever, chills.   Feeling worse.     The following portions of the patient's history were reviewed and updated as appropriate: allergies, current medications, past family history, past medical history, past social history, past surgical history and problem list.    Review of Systems   Constitutional: Positive for activity change. Negative for appetite change, chills, fatigue and fever.   HENT: Positive for congestion and ear pain. Negative for sinus pressure, sneezing and sore throat.         Left ear pain and left jaw pain    Eyes: Negative.    Respiratory: Negative.    Cardiovascular: Negative.    Gastrointestinal: Negative.    Musculoskeletal: Negative.    Skin: Negative.    Allergic/Immunologic: Positive for environmental allergies.   Neurological: Positive for headaches.   Hematological: Negative.    Psychiatric/Behavioral: Negative.      No past surgical history on file.  Past Medical History:   Diagnosis Date   • Acute conjunctivitis    • Disc degeneration, lumbar    • Hypertension    • Peroneal muscular atrophy    • Skin candidiasis        Objective   Allergies   Allergen Reactions   • Vioxx [Rofecoxib]      Visit Vitals  /84   Pulse 84   Temp 97.3 °F (36.3 °C)   Resp 18   Ht 185.4 cm (73\")   Wt (!) 193 kg (426 lb)   BMI 56.20 kg/m²       Physical Exam   Constitutional: He is oriented to person, place, and time. He appears well-developed and well-nourished. He is cooperative. He appears ill.   Blood pressure is elevated   Pain is a 8/10  Left ear.      HENT:   Head: Normocephalic.   Right Ear: Hearing, tympanic membrane, external ear and ear canal normal.   Left Ear: There is " tenderness. Tympanic membrane is erythematous and bulging. A middle ear effusion is present. Decreased hearing is noted.   Nose: Nose normal. Right sinus exhibits no maxillary sinus tenderness and no frontal sinus tenderness. Left sinus exhibits no maxillary sinus tenderness and no frontal sinus tenderness.   Mouth/Throat: Uvula is midline and mucous membranes are normal. Posterior oropharyngeal erythema present.   Eyes: Pupils are equal, round, and reactive to light.   Neck: Normal range of motion.   Cardiovascular: Normal rate and regular rhythm.   Pulmonary/Chest: Effort normal and breath sounds normal.   Abdominal: Soft. Bowel sounds are normal.   Lymphadenopathy:     He has cervical adenopathy.   Neurological: He is alert and oriented to person, place, and time.   Skin: Skin is warm, dry and intact. Capillary refill takes less than 2 seconds.   Psychiatric: He has a normal mood and affect. His behavior is normal.   Vitals reviewed.      Assessment/Plan   Ming was seen today for earache.    Diagnoses and all orders for this visit:    Acute otitis externa of left ear, unspecified type  -     amoxicillin (AMOXIL) 875 MG tablet; Take 1 tablet by mouth 2 (Two) Times a Day for 7 days.  -     ofloxacin (FLOXIN) 0.3 % otic solution; Administer 5 drops into the left ear Daily.    Left acute otitis media  -     amoxicillin (AMOXIL) 875 MG tablet; Take 1 tablet by mouth 2 (Two) Times a Day for 7 days.      See otitis media/externa  - patient instructions   Follow up with PCP for blood pressure control if it remains elevated once the pain has resolved.     Follow up as needed              Patient Instructions   Otitis Media, Adult    Otitis media occurs when there is inflammation and fluid in the middle ear. Your middle ear is a part of the ear that contains bones for hearing as well as air that helps send sounds to your brain.  What are the causes?  This condition is caused by a blockage in the eustachian tube. This tube  drains fluid from the ear to the back of the nose (nasopharynx). A blockage in this tube can be caused by an object or by swelling (edema) in the tube. Problems that can cause a blockage include:  · A cold or other upper respiratory infection.  · Allergies.  · An irritant, such as tobacco smoke.  · Enlarged adenoids. The adenoids are areas of soft tissue located high in the back of the throat, behind the nose and the roof of the mouth.  · A mass in the nasopharynx.  · Damage to the ear caused by pressure changes (barotrauma).  What are the signs or symptoms?  Symptoms of this condition include:  · Ear pain.  · A fever.  · Decreased hearing.  · A headache.  · Tiredness (lethargy).  · Fluid leaking from the ear.  · Ringing in the ear.  How is this diagnosed?  This condition is diagnosed with a physical exam. During the exam your health care provider will use an instrument called an otoscope to look into your ear and check for redness, swelling, and fluid. He or she will also ask about your symptoms.  Your health care provider may also order tests, such as:  · A test to check the movement of the eardrum (pneumatic otoscopy). This test is done by squeezing a small amount of air into the ear.  · A test that changes air pressure in the middle ear to check how well the eardrum moves and whether the eustachian tube is working (tympanogram).  How is this treated?  This condition usually goes away on its own within 3-5 days. But if the condition is caused by a bacteria infection and does not go away own its own, or keeps coming back, your health care provider may:  · Prescribe antibiotic medicines to treat the infection.  · Prescribe or recommend medicines to control pain.  Follow these instructions at home:  · Take over-the-counter and prescription medicines only as told by your health care provider.  · If you were prescribed an antibiotic medicine, take it as told by your health care provider. Do not stop taking the  "antibiotic even if you start to feel better.  · Keep all follow-up visits as told by your health care provider. This is important.  Contact a health care provider if:  · You have bleeding from your nose.  · There is a lump on your neck.  · You are not getting better in 5 days.  · You feel worse instead of better.  Get help right away if:  · You have severe pain that is not controlled with medicine.  · You have swelling, redness, or pain around your ear.  · You have stiffness in your neck.  · A part of your face is paralyzed.  · The bone behind your ear (mastoid) is tender when you touch it.  · You develop a severe headache.  Summary  · Otitis media is redness, soreness, and swelling of the middle ear.  · This condition usually goes away on its own within 3-5 days.  · If the problem does not go away in 3-5 days, your health care provider may prescribe or recommend medicines to treat your symptoms.  · If you were prescribed an antibiotic medicine, take it as told by your health care provider.  This information is not intended to replace advice given to you by your health care provider. Make sure you discuss any questions you have with your health care provider.  Document Released: 09/22/2005 Document Revised: 12/08/2017 Document Reviewed: 12/08/2017  [employer] Interactive Patient Education © 2019 [employer]  Otitis Externa    Otitis externa is an infection of the outer ear canal. The outer ear canal is the area between the outside of the ear and the eardrum. Otitis externa is sometimes called \"swimmer's ear.\"  Follow these instructions at home:  · If you were given antibiotic ear drops, use them as told by your doctor. Do not stop using them even if your condition gets better.  · Take over-the-counter and prescription medicines only as told by your doctor.  · Keep all follow-up visits as told by your doctor. This is important.  How is this prevented?  · Keep your ear dry. Use the corner of a towel to dry your ear " after you swim or bathe.  · Try not to scratch or put things in your ear. Doing these things makes it easier for germs to grow in your ear.  · Avoid swimming in lakes, dirty water, or pools that may not have the right amount of a chemical called chlorine.  · Consider making ear drops and putting 3 or 4 drops in each ear after you swim. Ask your doctor about how you can make ear drops.  Contact a doctor if:  · You have a fever.  · After 3 days your ear is still red, swollen, or painful.  · After 3 days you still have pus coming from your ear.  · Your redness, swelling, or pain gets worse.  · You have a really bad headache.  · You have redness, swelling, pain, or tenderness behind your ear.  This information is not intended to replace advice given to you by your health care provider. Make sure you discuss any questions you have with your health care provider.  Document Released: 06/05/2009 Document Revised: 01/12/2017 Document Reviewed: 09/26/2016  [employer] Interactive Patient Education © 2019 [employer] Inc.        Nakia Bailon, APRN

## 2020-04-23 DIAGNOSIS — I10 ESSENTIAL HYPERTENSION: ICD-10-CM

## 2020-04-23 DIAGNOSIS — E11.9 TYPE 2 DIABETES MELLITUS WITHOUT COMPLICATION, WITHOUT LONG-TERM CURRENT USE OF INSULIN (HCC): Primary | ICD-10-CM

## 2020-04-23 RX ORDER — BENAZEPRIL HYDROCHLORIDE 10 MG/1
10 TABLET ORAL DAILY
Qty: 90 TABLET | Refills: 0 | Status: SHIPPED | OUTPATIENT
Start: 2020-04-23 | End: 2020-08-14 | Stop reason: SDUPTHER

## 2020-04-23 RX ORDER — TRIAMTERENE AND HYDROCHLOROTHIAZIDE 37.5; 25 MG/1; MG/1
1 TABLET ORAL DAILY
Qty: 90 TABLET | Refills: 0 | Status: SHIPPED | OUTPATIENT
Start: 2020-04-23 | End: 2020-08-31 | Stop reason: SDUPTHER

## 2020-04-23 RX ORDER — METFORMIN HYDROCHLORIDE 500 MG/1
500 TABLET, EXTENDED RELEASE ORAL
Qty: 90 TABLET | Refills: 0 | Status: SHIPPED | OUTPATIENT
Start: 2020-04-23 | End: 2020-08-31 | Stop reason: SDUPTHER

## 2020-04-23 NOTE — TELEPHONE ENCOUNTER
PATIENT HAS CALLED TO REQUEST A REFILL ON THE FOLLOWING MEDICATIONS  triamterene-hydrochlorothiazide (MAXZIDE-25) 37.5-25 MG per tablet  metFORMIN ER (GLUCOPHAGE-XR) 500 MG 24 hr tablet  benazepril (LOTENSIN) 10 MG tablet  [facility] ON [address] CONFIRMED   PLEASE CALL PATIENT FOR ANY QUESTIONS OR CONCERNS :  [phone]

## 2020-07-29 DIAGNOSIS — E11.9 TYPE 2 DIABETES MELLITUS WITHOUT COMPLICATION, WITHOUT LONG-TERM CURRENT USE OF INSULIN (HCC): ICD-10-CM

## 2020-07-30 RX ORDER — METFORMIN HYDROCHLORIDE 500 MG/1
TABLET, EXTENDED RELEASE ORAL
Qty: 90 TABLET | Refills: 0 | OUTPATIENT
Start: 2020-07-30

## 2020-08-14 DIAGNOSIS — I10 ESSENTIAL HYPERTENSION: ICD-10-CM

## 2020-08-14 RX ORDER — BENAZEPRIL HYDROCHLORIDE 10 MG/1
10 TABLET ORAL DAILY
Qty: 30 TABLET | Refills: 0 | Status: SHIPPED | OUTPATIENT
Start: 2020-08-14 | End: 2020-08-31 | Stop reason: SDUPTHER

## 2020-08-28 ENCOUNTER — NURSE ONLY (OUTPATIENT)
Dept: PRIMARY CARE CLINIC | Age: 50
End: 2020-08-28

## 2020-08-28 PROCEDURE — 99211 OFF/OP EST MAY X REQ PHY/QHP: CPT | Performed by: NURSE PRACTITIONER

## 2020-08-29 LAB — SARS-COV-2, NAA: NOT DETECTED

## 2020-08-31 ENCOUNTER — OFFICE VISIT (OUTPATIENT)
Dept: FAMILY MEDICINE CLINIC | Facility: CLINIC | Age: 50
End: 2020-08-31

## 2020-08-31 VITALS
RESPIRATION RATE: 18 BRPM | WEIGHT: 315 LBS | DIASTOLIC BLOOD PRESSURE: 82 MMHG | HEART RATE: 91 BPM | BODY MASS INDEX: 41.75 KG/M2 | HEIGHT: 73 IN | TEMPERATURE: 97.5 F | SYSTOLIC BLOOD PRESSURE: 134 MMHG | OXYGEN SATURATION: 98 %

## 2020-08-31 DIAGNOSIS — E11.9 TYPE 2 DIABETES MELLITUS WITHOUT COMPLICATION, WITHOUT LONG-TERM CURRENT USE OF INSULIN (HCC): ICD-10-CM

## 2020-08-31 DIAGNOSIS — I10 ESSENTIAL HYPERTENSION: ICD-10-CM

## 2020-08-31 DIAGNOSIS — E11.9 CONTROLLED TYPE 2 DIABETES MELLITUS WITHOUT COMPLICATION, WITHOUT LONG-TERM CURRENT USE OF INSULIN (HCC): ICD-10-CM

## 2020-08-31 DIAGNOSIS — Z01.818 PRE-OP EXAM: Primary | ICD-10-CM

## 2020-08-31 PROCEDURE — 99214 OFFICE O/P EST MOD 30 MIN: CPT | Performed by: FAMILY MEDICINE

## 2020-08-31 PROCEDURE — 85027 COMPLETE CBC AUTOMATED: CPT | Performed by: FAMILY MEDICINE

## 2020-08-31 PROCEDURE — 83036 HEMOGLOBIN GLYCOSYLATED A1C: CPT | Performed by: FAMILY MEDICINE

## 2020-08-31 PROCEDURE — 80048 BASIC METABOLIC PNL TOTAL CA: CPT | Performed by: FAMILY MEDICINE

## 2020-08-31 PROCEDURE — 93000 ELECTROCARDIOGRAM COMPLETE: CPT | Performed by: FAMILY MEDICINE

## 2020-08-31 RX ORDER — GABAPENTIN 100 MG/1
100 CAPSULE ORAL 3 TIMES DAILY
COMMUNITY
Start: 2020-08-28 | End: 2020-10-07 | Stop reason: SDUPTHER

## 2020-08-31 RX ORDER — BENAZEPRIL HYDROCHLORIDE 10 MG/1
10 TABLET ORAL DAILY
Qty: 90 TABLET | Refills: 1 | Status: SHIPPED | OUTPATIENT
Start: 2020-08-31 | End: 2021-04-08

## 2020-08-31 RX ORDER — DULOXETIN HYDROCHLORIDE 30 MG/1
CAPSULE, DELAYED RELEASE ORAL
COMMUNITY
Start: 2020-08-28 | End: 2020-10-07 | Stop reason: SDUPTHER

## 2020-08-31 RX ORDER — TRIAMTERENE AND HYDROCHLOROTHIAZIDE 37.5; 25 MG/1; MG/1
1 TABLET ORAL DAILY
Qty: 90 TABLET | Refills: 1 | Status: SHIPPED | OUTPATIENT
Start: 2020-08-31 | End: 2020-12-23 | Stop reason: SDUPTHER

## 2020-08-31 RX ORDER — METFORMIN HYDROCHLORIDE 500 MG/1
500 TABLET, EXTENDED RELEASE ORAL
Qty: 90 TABLET | Refills: 1 | Status: SHIPPED | OUTPATIENT
Start: 2020-08-31 | End: 2021-03-25 | Stop reason: SDUPTHER

## 2020-08-31 NOTE — PROGRESS NOTES
[facility] / [facility] ([facility]) EDUARDO [address] [place], [address]    Acknowledgment of Informed Consent for Surgical or Medical Procedure and Sedation  I agree to allow doctor(s) Jairo Quintero [facility] and his/her associates or assistants, including residents and/or other qualified medical practitioner to perform the following medical treatment or procedure and to administer or direct the administration of sedation as necessary:  Procedure(s): RIGHT PANTALAR ARTHRODESIS, GASTROCNEMIUS RECESSION; POSSIBLE TENDO-ACHILLES LENGTHENING, POSSIBLE RELEASE OF MEDIAL ANKLE TENDONS (MULIPLE)  My doctor has explained the following regarding the proposed procedure:   the explanation of the procedure   the benefits of the procedure   the potential problems that might occur during recuperation   the risks and side effects of the procedure which could include but are not limited to severe blood loss, infection, stroke or death   the benefits, risks and side effect of alternative procedures including the consequences of declining this procedure or any alternative procedures   the likelihood of achieving satisfactory results. I acknowledge no guarantee or assurance has been made to me regarding the results. I understand that during the course of this treatment/procedure, unforeseen conditions can occur which require an additional or different procedure. I agree to allow my physician or assistants to perform such extension of the original procedure as they may find necessary. I understand that sedation will often result in temporary impairment of memory and fine motor skills and that sedation can occasionally progress to a state of deep sedation or general anesthesia. I understand the risks of anesthesia for surgery include, but are not limited to, sore throat, hoarseness, injury to face, mouth, or teeth; nausea; headache; injury to blood vessels or nerves; death, brain damage, or paralysis.     I understand that if I have a Limitation of Treatment order in effect during my hospitalization, the order may or may not be in effect during this procedure. I give my doctor permission to give me blood or blood products. I understand that there are risks with receiving blood such as hepatitis, AIDS, fever, or allergic reaction. I acknowledge that the risks, benefits, and alternatives of this treatment have been explained to me and that no express or implied warranty has been given by the hospital, any blood bank, or any person or entity as to the blood or blood components transfused. At the discretion of my doctor, I agree to allow observers, equipment/product representatives and allow photographing, and/or televising of the procedure, provided my name or identity is maintained confidentially. I agree the hospital may dispose of or use for scientific or educational purposes any tissue, fluid, or body parts which may be removed.     ________________________________Date________Time______ am/pm  ([facility])  Patient or Signature of Closest Relative or Legal Guardian    ________________________________Date________Time______am/pm      Page 1 of  1  Witness

## 2020-08-31 NOTE — PROGRESS NOTES
"Subjective   Ming Swenson is a 49 y.o. male.     Patient is here for preoperative exam he is planning on having foot surgery on Thursday in [place].  He has been generally doing well has however been unable to lose any significant weight.  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       The following portions of the patient's history were reviewed and updated as appropriate: allergies, current medications, past social history and problem list.    Review of Systems   Constitutional: Negative for chills and fever.   HENT: Negative for congestion and sore throat.    Respiratory: Negative for cough and shortness of breath.    Cardiovascular: Positive for leg swelling. Negative for chest pain and palpitations.   Gastrointestinal: Negative for diarrhea, nausea and vomiting.   Endocrine: Negative for cold intolerance and heat intolerance.   Genitourinary: Negative for dysuria and hematuria.   Musculoskeletal: Positive for arthralgias.   Neurological: Positive for numbness.       Objective   /82   Pulse 91   Temp 97.5 °F (36.4 °C)   Resp 18   Ht 185.4 cm (73\")   Wt (!) 197 kg (435 lb)   SpO2 98%   BMI 57.39 kg/m²   Physical Exam   Constitutional: He is oriented to person, place, and time.   Morbidly obese white male in no acute distress   HENT:   Head: Normocephalic and atraumatic.   Eyes: Pupils are equal, round, and reactive to light. Conjunctivae are normal.   Neck: Neck supple.   Cardiovascular: Normal rate, regular rhythm and normal heart sounds.   Pulmonary/Chest: Effort normal and breath sounds normal.   Abdominal: Soft. Bowel sounds are normal.   Musculoskeletal: He exhibits edema.   Neurological: He is alert and oriented to person, place, and time.   Skin: Skin is warm and dry.   Psychiatric: He has a normal mood and affect.   Nursing note and vitals reviewed.        Assessment/Plan   Problem List Items Addressed This Visit        Active " Problems    Hypertension    Relevant Medications    benazepril (LOTENSIN) 10 MG tablet    triamterene-hydrochlorothiazide (MAXZIDE-25) 37.5-25 MG per tablet    Other Relevant Orders    Basic Metabolic Panel    ECG 12 Lead      Other Visit Diagnoses     Pre-op exam    -  Primary    Relevant Orders    Basic Metabolic Panel    Hemoglobin A1c    CBC (No Diff)    ECG 12 Lead    Controlled type 2 diabetes mellitus without complication, without long-term current use of insulin (CMS/[facility])        Relevant Medications    metFORMIN ER (GLUCOPHAGE-XR) 500 MG 24 hr tablet    Other Relevant Orders    Hemoglobin A1c    Type 2 diabetes mellitus without complication, without long-term current use of insulin (CMS/[facility])        Relevant Medications    metFORMIN ER (GLUCOPHAGE-XR) 500 MG 24 hr tablet          New Medications Ordered This Visit   Medications   • benazepril (LOTENSIN) 10 MG tablet     Sig: Take 1 tablet by mouth Daily.     Dispense:  90 tablet     Refill:  1     Needs an appointment for 90 day rx   • metFORMIN ER (GLUCOPHAGE-XR) 500 MG 24 hr tablet     Sig: Take 1 tablet by mouth Daily With Breakfast.     Dispense:  90 tablet     Refill:  1   • triamterene-hydrochlorothiazide (MAXZIDE-25) 37.5-25 MG per tablet     Sig: Take 1 tablet by mouth Daily.     Dispense:  90 tablet     Refill:  1     See the preop form.    ECG 12 Lead  Date/Time: 8/31/2020 5:42 PM  Performed by: Patrick Chan MD  Authorized by: Patrick Chan MD   Comparison: compared with previous ECG   Similar to previous ECG  Rhythm: sinus rhythm  Rate: normal  BPM: 74  Q waves: II, III, aVF, V5 and V6    QRS axis: normal  Other findings: non-specific ST-T wave changes    Clinical impression: abnormal EKG  Comments: No significant EKG changes as compared to July 2018.  Subsequent stress testing in 2018 was negative.  Changes may be related to body habitus.

## 2020-09-01 ENCOUNTER — TELEPHONE (OUTPATIENT)
Dept: FAMILY MEDICINE CLINIC | Facility: CLINIC | Age: 50
End: 2020-09-01

## 2020-09-01 LAB
ANION GAP SERPL CALCULATED.3IONS-SCNC: 9.8 MMOL/L (ref 5–15)
BUN SERPL-MCNC: 10 MG/DL (ref 6–20)
BUN/CREAT SERPL: 12.8 (ref 7–25)
CALCIUM SPEC-SCNC: 9.5 MG/DL (ref 8.6–10.5)
CHLORIDE SERPL-SCNC: 100 MMOL/L (ref 98–107)
CO2 SERPL-SCNC: 25.2 MMOL/L (ref 22–29)
CREAT SERPL-MCNC: 0.78 MG/DL (ref 0.76–1.27)
DEPRECATED RDW RBC AUTO: 41.9 FL (ref 37–54)
ERYTHROCYTE [DISTWIDTH] IN BLOOD BY AUTOMATED COUNT: 13.5 % (ref 12.3–15.4)
GFR SERPL CREATININE-BSD FRML MDRD: 107 ML/MIN/1.73
GLUCOSE SERPL-MCNC: 112 MG/DL (ref 65–99)
HBA1C MFR BLD: 6.98 % (ref 4.8–5.6)
HCT VFR BLD AUTO: 46.8 % (ref 37.5–51)
HGB BLD-MCNC: 16.2 G/DL (ref 13–17.7)
MCH RBC QN AUTO: 29.8 PG (ref 26.6–33)
MCHC RBC AUTO-ENTMCNC: 34.6 G/DL (ref 31.5–35.7)
MCV RBC AUTO: 86.2 FL (ref 79–97)
PLATELET # BLD AUTO: 249 10*3/MM3 (ref 140–450)
PMV BLD AUTO: 12.6 FL (ref 6–12)
POTASSIUM SERPL-SCNC: 4.9 MMOL/L (ref 3.5–5.2)
RBC # BLD AUTO: 5.43 10*6/MM3 (ref 4.14–5.8)
SODIUM SERPL-SCNC: 135 MMOL/L (ref 136–145)
WBC # BLD AUTO: 6.57 10*3/MM3 (ref 3.4–10.8)

## 2020-09-01 RX ORDER — TRIAMTERENE AND HYDROCHLOROTHIAZIDE 37.5; 25 MG/1; MG/1
1 TABLET ORAL DAILY
COMMUNITY
Start: 2020-04-23

## 2020-09-01 RX ORDER — BENAZEPRIL HYDROCHLORIDE 10 MG/1
10 TABLET ORAL DAILY
COMMUNITY
Start: 2020-08-14

## 2020-09-01 RX ORDER — METFORMIN HYDROCHLORIDE 500 MG/1
500 TABLET, EXTENDED RELEASE ORAL
Status: ON HOLD | COMMUNITY
Start: 2020-04-23 | End: 2021-02-12 | Stop reason: SDUPTHER

## 2020-09-01 RX ORDER — DULOXETIN HYDROCHLORIDE 30 MG/1
30 CAPSULE, DELAYED RELEASE ORAL DAILY
Status: ON HOLD | COMMUNITY
End: 2021-02-09

## 2020-09-01 NOTE — PROGRESS NOTES
[facility] PRE-SURGICAL TESTING INSTRUCTIONS                              PRIOR TO PROCEDURE DATE:  1. Please follow any guidelines/instructions prior to your procedure as advised by your surgeon. 2. Arrange for someone to drive you home and be with you for the first 24 hours after discharge for your safety after your procedure for which you received sedation. Ensure it is someone we can share information with regarding your discharge. 3. You must contact your surgeon for instructions IF:   You are taking any blood thinners, aspirin, anti-inflammatory or vitamin E.   There is a change in your physical condition such as a cold, fever, rash, cuts, sores or any other infection, especially near your surgical site. 4. Do not drink alcohol the day before or day of your procedure. 5. A Pre-op History and Physical for surgery MUST be completed by your Physician or Urgent Care within 30 days of your procedure date. Please bring a copy with you on the day of your procedure and along with any other testing performed. THE DAY OF YOUR PROCEDURE:  1. Follow instructions for ARRIVAL TIME as DIRECTED BY YOUR SURGEON. I    2. Enter the MAIN entrance from [employer] and follow the signs to the free [employer] or [employer] parking (offered free of charge 6am-5pm). 3. Enter the Main Entrance of the hospital (do not enter from the lower level of the parking garage). Upon entrance, check in with the  at the main desk on your left. If no one is available at the desk, proceed into the [facility] Waiting Room and go through the door directly into the [facility]. There is a Check-in desk ACROSS from Room 5 (marked with a sign hanging from the ceiling). The phone number for the surgery center is [phone]. 4. Please call [phone] option #2 option #2 if you have not been preregistered yet. On the day of your procedure bring your insurance card and photo ID.  You will be room.    13. If you have a Living Will or Durable Power of , please bring a copy on the day of your procedure. 15. With your permission, one family member may accompany you while you are being prepared for surgery. Once you are ready, additional family members may join you. HOW WE KEEP YOU SAFE and WORK TO PREVENT SURGICAL SITE INFECTIONS:  1. Health care workers should always check your ID bracelet to verify your name and birth date. You will be asked many times to state your name, date of birth, and allergies. 2. Health care workers should always clean their hands with soap or alcohol gel before providing care to you. It is okay to ask anyone if they cleaned their hands before they touch you. 3. You will be actively involved in verifying the type of procedure you are having and ensuring the correct surgical site. This will be confirmed multiple times prior to your procedure. Do NOT thaddeus your surgery site UNLESS instructed to by your surgeon. 4. Do not shave or wax for 72 hours prior to procedure near your operative site. Shaving with a razor can irritate your skin and make it easier to develop an infection. On the day of your procedure, any hair that needs to be removed near the surgical site will be clipped by a healthcare worker using a special clippers designed to avoid skin irritation. 5. When you are in the operating room, your surgical site will be cleansed with a special soap, and in most cases, you will be given an antibiotic before the surgery begins. What to expect AFTER YOUR PROCEDURE:  1. Immediately following your procedure, your will be taken to the PACU for the first phase of your recovery. Your nurse will help you recover from any potential side effects of anesthesia, such as extreme drowsiness, changes in your vital signs or breathing patterns. Nausea, headache, muscle aches, or sore throat may also occur after anesthesia.   Your nurse will help you manage these potential side effects. 2. For comfort and safety, arrange to have someone at home with you for the first 24 hours after discharge. 3. You and your family will be given written instructions about your diet, activity, dressing care, medications, and return visits. 4. Once at home, should issues with nausea, pain, or bleeding occur, or should you notice any signs of infection, you should call your surgeon. 5. Always clean your hands before and after caring for your wound. Do not let your family touch your surgery site without cleaning their hands. 6. Narcotic pain medications can cause significant constipation. You may want to add a stool softener to your postoperative medication schedule or speak to your surgeon on how best to manage this SIDE EFFECT. SPECIAL INSTRUCTIONS     Thank you for allowing us to care for you. We strive to exceed your expectations in the delivery of care and service provided to you and your family. If you need to contact us for any reason, please call us at [phone]    Instructions reviewed with patient during preadmission testing phone interview. Diann Zheng. 9/1/2020 .2:26 PM      ADDITIONAL EDUCATIONAL INFORMATION REVIEWED PER PHONE WITH YOU AND/OR YOUR FAMILY:  No Bring a urine sample on day of surgery  Yes Pain Goal-Taking Control of Your Pain  Yes FAQs about Surgical Site Infections  No Hibiclens® Bathing Instructions   Yes Antibacterial Soap  No Dl® Wipes Bathing Instructions (Obtained from: [email or web address]://www.[employer]/. pdf )  No Incentive Spirometer Education  No Other

## 2020-09-01 NOTE — TELEPHONE ENCOUNTER
PT CALLED TO SEE IF PRE-SURGERY PACKET HAS BEEN FAXED OVER TO DR. DSOUZA.    PLEASE ADVISE.  CALL BACK:[phone]

## 2020-09-02 ENCOUNTER — ANESTHESIA EVENT (OUTPATIENT)
Dept: OPERATING ROOM | Age: 50
DRG: 493 | End: 2020-09-02
Payer: COMMERCIAL

## 2020-09-02 ENCOUNTER — TELEPHONE (OUTPATIENT)
Dept: FAMILY MEDICINE CLINIC | Facility: CLINIC | Age: 50
End: 2020-09-02

## 2020-09-02 NOTE — TELEPHONE ENCOUNTER
RIRI CALLED FROM [facility] IN [facility] AND PT IS HAVING SURGERY FIRST THING IN THE MORNING AND THEY NEED COPY OF RECENT LABS (DR KATE CAAL SIGNED OFF YET) AND MOST RECENT EKG; PLEASE ADVISE    RIRI: [phone] PHONE  [phone] BEFORE 4P; AFTER 4P, [phone]

## 2020-09-02 NOTE — PROGRESS NOTES
9/1 called PCP for labs/ EKG  9/2 1050 called PCP for labs/EKG   9/2 1510 called PCP for labs/EKG     James Roque Stated MD has not signed off on labs and EKG ye.  She will have him sign off on testing ASAP after finished with patients and fax to [employer] tonight

## 2020-09-03 ENCOUNTER — ANESTHESIA (OUTPATIENT)
Dept: OPERATING ROOM | Age: 50
DRG: 493 | End: 2020-09-03
Payer: COMMERCIAL

## 2020-09-03 ENCOUNTER — APPOINTMENT (OUTPATIENT)
Dept: GENERAL RADIOLOGY | Age: 50
DRG: 493 | End: 2020-09-03
Attending: ORTHOPAEDIC SURGERY
Payer: COMMERCIAL

## 2020-09-03 ENCOUNTER — HOSPITAL ENCOUNTER (INPATIENT)
Age: 50
LOS: 6 days | Discharge: NURSING, REHAB OR LONG-TERM CARE | DRG: 493 | End: 2020-09-09
Attending: ORTHOPAEDIC SURGERY | Admitting: ORTHOPAEDIC SURGERY
Payer: COMMERCIAL

## 2020-09-03 VITALS — TEMPERATURE: 97 F | OXYGEN SATURATION: 97 % | DIASTOLIC BLOOD PRESSURE: 56 MMHG | SYSTOLIC BLOOD PRESSURE: 113 MMHG

## 2020-09-03 PROBLEM — M19.071 ARTHRITIS OF RIGHT ANKLE: Status: ACTIVE | Noted: 2020-09-03

## 2020-09-03 LAB
GLUCOSE BLD-MCNC: 138 MG/DL (ref 70–99)
GLUCOSE BLD-MCNC: 97 MG/DL (ref 70–99)
PERFORMED ON: ABNORMAL
PERFORMED ON: NORMAL

## 2020-09-03 PROCEDURE — 2580000003 HC RX 258: Performed by: ORTHOPAEDIC SURGERY

## 2020-09-03 PROCEDURE — C1769 GUIDE WIRE: HCPCS | Performed by: ORTHOPAEDIC SURGERY

## 2020-09-03 PROCEDURE — 73600 X-RAY EXAM OF ANKLE: CPT

## 2020-09-03 PROCEDURE — 3600000004 HC SURGERY LEVEL 4 BASE: Performed by: ORTHOPAEDIC SURGERY

## 2020-09-03 PROCEDURE — 6370000000 HC RX 637 (ALT 250 FOR IP): Performed by: ORTHOPAEDIC SURGERY

## 2020-09-03 PROCEDURE — 2500000003 HC RX 250 WO HCPCS: Performed by: NURSE ANESTHETIST, CERTIFIED REGISTERED

## 2020-09-03 PROCEDURE — 2780000010 HC IMPLANT OTHER: Performed by: ORTHOPAEDIC SURGERY

## 2020-09-03 PROCEDURE — 7100000000 HC PACU RECOVERY - FIRST 15 MIN: Performed by: ORTHOPAEDIC SURGERY

## 2020-09-03 PROCEDURE — 64445 NJX AA&/STRD SCIATIC NRV IMG: CPT | Performed by: ANESTHESIOLOGY

## 2020-09-03 PROCEDURE — 6360000002 HC RX W HCPCS: Performed by: ANESTHESIOLOGY

## 2020-09-03 PROCEDURE — 7100000001 HC PACU RECOVERY - ADDTL 15 MIN: Performed by: ORTHOPAEDIC SURGERY

## 2020-09-03 PROCEDURE — 6360000002 HC RX W HCPCS: Performed by: ORTHOPAEDIC SURGERY

## 2020-09-03 PROCEDURE — 6360000002 HC RX W HCPCS: Performed by: NURSE ANESTHETIST, CERTIFIED REGISTERED

## 2020-09-03 PROCEDURE — 3700000000 HC ANESTHESIA ATTENDED CARE: Performed by: ORTHOPAEDIC SURGERY

## 2020-09-03 PROCEDURE — 2580000003 HC RX 258: Performed by: ANESTHESIOLOGY

## 2020-09-03 PROCEDURE — 1200000000 HC SEMI PRIVATE

## 2020-09-03 PROCEDURE — 3600000014 HC SURGERY LEVEL 4 ADDTL 15MIN: Performed by: ORTHOPAEDIC SURGERY

## 2020-09-03 PROCEDURE — C1713 ANCHOR/SCREW BN/BN,TIS/BN: HCPCS | Performed by: ORTHOPAEDIC SURGERY

## 2020-09-03 PROCEDURE — 0L8N0ZZ DIVISION OF RIGHT LOWER LEG TENDON, OPEN APPROACH: ICD-10-PCS | Performed by: ORTHOPAEDIC SURGERY

## 2020-09-03 PROCEDURE — 0SGF0KZ FUSION OF RIGHT ANKLE JOINT WITH NONAUTOLOGOUS TISSUE SUBSTITUTE, OPEN APPROACH: ICD-10-PCS | Performed by: ORTHOPAEDIC SURGERY

## 2020-09-03 PROCEDURE — 0L8V0ZZ DIVISION OF RIGHT FOOT TENDON, OPEN APPROACH: ICD-10-PCS | Performed by: ORTHOPAEDIC SURGERY

## 2020-09-03 PROCEDURE — 3209999900 FLUORO FOR SURGICAL PROCEDURES

## 2020-09-03 PROCEDURE — C1762 CONN TISS, HUMAN(INC FASCIA): HCPCS | Performed by: ORTHOPAEDIC SURGERY

## 2020-09-03 PROCEDURE — 0SGH04Z FUSION OF RIGHT TARSAL JOINT WITH INTERNAL FIXATION DEVICE, OPEN APPROACH: ICD-10-PCS | Performed by: ORTHOPAEDIC SURGERY

## 2020-09-03 PROCEDURE — 2709999900 HC NON-CHARGEABLE SUPPLY: Performed by: ORTHOPAEDIC SURGERY

## 2020-09-03 PROCEDURE — 87641 MR-STAPH DNA AMP PROBE: CPT

## 2020-09-03 PROCEDURE — 2720000010 HC SURG SUPPLY STERILE: Performed by: ORTHOPAEDIC SURGERY

## 2020-09-03 PROCEDURE — 3700000001 HC ADD 15 MINUTES (ANESTHESIA): Performed by: ORTHOPAEDIC SURGERY

## 2020-09-03 DEVICE — GRAFT BONE SUB 5CC VIABLE BONE MTRX BIOFUSE: Type: IMPLANTABLE DEVICE | Site: ANKLE | Status: FUNCTIONAL

## 2020-09-03 DEVICE — BONE GRAFT KIT 7510100 INFUSE X SMALL
Type: IMPLANTABLE DEVICE | Site: ANKLE | Status: FUNCTIONAL
Brand: INFUSE® BONE GRAFT

## 2020-09-03 DEVICE — IMPLANTABLE DEVICE: Type: IMPLANTABLE DEVICE | Site: ANKLE | Status: FUNCTIONAL

## 2020-09-03 DEVICE — LAG SCREW (CANNULATED HEADED) 4.5 X 40 MM
Type: IMPLANTABLE DEVICE | Site: ANKLE | Status: FUNCTIONAL
Brand: IO FIX

## 2020-09-03 RX ORDER — FENTANYL CITRATE 50 UG/ML
50 INJECTION, SOLUTION INTRAMUSCULAR; INTRAVENOUS EVERY 5 MIN PRN
Status: COMPLETED | OUTPATIENT
Start: 2020-09-03 | End: 2020-09-03

## 2020-09-03 RX ORDER — SODIUM CHLORIDE 0.9 % (FLUSH) 0.9 %
10 SYRINGE (ML) INJECTION EVERY 12 HOURS SCHEDULED
Status: DISCONTINUED | OUTPATIENT
Start: 2020-09-03 | End: 2020-09-03 | Stop reason: HOSPADM

## 2020-09-03 RX ORDER — CEFAZOLIN SODIUM 1 G/3ML
INJECTION, POWDER, FOR SOLUTION INTRAMUSCULAR; INTRAVENOUS PRN
Status: DISCONTINUED | OUTPATIENT
Start: 2020-09-03 | End: 2020-09-03 | Stop reason: SDUPTHER

## 2020-09-03 RX ORDER — METFORMIN HYDROCHLORIDE 500 MG/1
500 TABLET, EXTENDED RELEASE ORAL
Status: DISCONTINUED | OUTPATIENT
Start: 2020-09-04 | End: 2020-09-09 | Stop reason: HOSPADM

## 2020-09-03 RX ORDER — DIPHENHYDRAMINE HYDROCHLORIDE 50 MG/ML
12.5 INJECTION INTRAMUSCULAR; INTRAVENOUS
Status: DISCONTINUED | OUTPATIENT
Start: 2020-09-03 | End: 2020-09-03 | Stop reason: HOSPADM

## 2020-09-03 RX ORDER — GABAPENTIN 100 MG/1
100 CAPSULE ORAL 3 TIMES DAILY
Status: DISCONTINUED | OUTPATIENT
Start: 2020-09-03 | End: 2020-09-09 | Stop reason: HOSPADM

## 2020-09-03 RX ORDER — ROCURONIUM BROMIDE 10 MG/ML
INJECTION, SOLUTION INTRAVENOUS PRN
Status: DISCONTINUED | OUTPATIENT
Start: 2020-09-03 | End: 2020-09-03 | Stop reason: SDUPTHER

## 2020-09-03 RX ORDER — MORPHINE SULFATE 2 MG/ML
2 INJECTION, SOLUTION INTRAMUSCULAR; INTRAVENOUS
Status: DISCONTINUED | OUTPATIENT
Start: 2020-09-03 | End: 2020-09-05

## 2020-09-03 RX ORDER — HYDROCODONE BITARTRATE AND ACETAMINOPHEN 5; 325 MG/1; MG/1
1 TABLET ORAL EVERY 4 HOURS PRN
Status: DISCONTINUED | OUTPATIENT
Start: 2020-09-03 | End: 2020-09-09 | Stop reason: HOSPADM

## 2020-09-03 RX ORDER — SODIUM CHLORIDE 0.9 % (FLUSH) 0.9 %
10 SYRINGE (ML) INJECTION EVERY 12 HOURS SCHEDULED
Status: DISCONTINUED | OUTPATIENT
Start: 2020-09-03 | End: 2020-09-09 | Stop reason: HOSPADM

## 2020-09-03 RX ORDER — SODIUM CHLORIDE 9 MG/ML
INJECTION, SOLUTION INTRAVENOUS CONTINUOUS
Status: DISCONTINUED | OUTPATIENT
Start: 2020-09-03 | End: 2020-09-04

## 2020-09-03 RX ORDER — ROPIVACAINE HYDROCHLORIDE 5 MG/ML
INJECTION, SOLUTION EPIDURAL; INFILTRATION; PERINEURAL
Status: COMPLETED | OUTPATIENT
Start: 2020-09-03 | End: 2020-09-03

## 2020-09-03 RX ORDER — GLYCOPYRROLATE 0.2 MG/ML
INJECTION INTRAMUSCULAR; INTRAVENOUS PRN
Status: DISCONTINUED | OUTPATIENT
Start: 2020-09-03 | End: 2020-09-03 | Stop reason: SDUPTHER

## 2020-09-03 RX ORDER — HEPARIN SODIUM 5000 [USP'U]/ML
5000 INJECTION, SOLUTION INTRAVENOUS; SUBCUTANEOUS EVERY 8 HOURS SCHEDULED
Status: DISCONTINUED | OUTPATIENT
Start: 2020-09-04 | End: 2020-09-04

## 2020-09-03 RX ORDER — ROPIVACAINE HYDROCHLORIDE 5 MG/ML
INJECTION, SOLUTION EPIDURAL; INFILTRATION; PERINEURAL
Status: COMPLETED
Start: 2020-09-03 | End: 2020-09-03

## 2020-09-03 RX ORDER — OXYCODONE HYDROCHLORIDE AND ACETAMINOPHEN 5; 325 MG/1; MG/1
1 TABLET ORAL PRN
Status: DISCONTINUED | OUTPATIENT
Start: 2020-09-03 | End: 2020-09-03 | Stop reason: HOSPADM

## 2020-09-03 RX ORDER — ONDANSETRON 2 MG/ML
4 INJECTION INTRAMUSCULAR; INTRAVENOUS
Status: DISCONTINUED | OUTPATIENT
Start: 2020-09-03 | End: 2020-09-03 | Stop reason: HOSPADM

## 2020-09-03 RX ORDER — SODIUM CHLORIDE, SODIUM LACTATE, POTASSIUM CHLORIDE, CALCIUM CHLORIDE 600; 310; 30; 20 MG/100ML; MG/100ML; MG/100ML; MG/100ML
INJECTION, SOLUTION INTRAVENOUS CONTINUOUS
Status: DISCONTINUED | OUTPATIENT
Start: 2020-09-03 | End: 2020-09-04

## 2020-09-03 RX ORDER — HYDRALAZINE HYDROCHLORIDE 20 MG/ML
5 INJECTION INTRAMUSCULAR; INTRAVENOUS EVERY 10 MIN PRN
Status: DISCONTINUED | OUTPATIENT
Start: 2020-09-03 | End: 2020-09-03 | Stop reason: HOSPADM

## 2020-09-03 RX ORDER — PROCHLORPERAZINE EDISYLATE 5 MG/ML
5 INJECTION INTRAMUSCULAR; INTRAVENOUS
Status: COMPLETED | OUTPATIENT
Start: 2020-09-03 | End: 2020-09-03

## 2020-09-03 RX ORDER — SODIUM CHLORIDE 0.9 % (FLUSH) 0.9 %
10 SYRINGE (ML) INJECTION PRN
Status: DISCONTINUED | OUTPATIENT
Start: 2020-09-03 | End: 2020-09-03 | Stop reason: HOSPADM

## 2020-09-03 RX ORDER — ONDANSETRON 2 MG/ML
INJECTION INTRAMUSCULAR; INTRAVENOUS PRN
Status: DISCONTINUED | OUTPATIENT
Start: 2020-09-03 | End: 2020-09-03 | Stop reason: SDUPTHER

## 2020-09-03 RX ORDER — ASPIRIN 325 MG
325 TABLET, DELAYED RELEASE (ENTERIC COATED) ORAL
Qty: 30 TABLET | Refills: 3 | COMMUNITY
Start: 2020-09-03 | End: 2020-09-09 | Stop reason: HOSPADM

## 2020-09-03 RX ORDER — OXYCODONE HYDROCHLORIDE AND ACETAMINOPHEN 5; 325 MG/1; MG/1
2 TABLET ORAL PRN
Status: DISCONTINUED | OUTPATIENT
Start: 2020-09-03 | End: 2020-09-03 | Stop reason: HOSPADM

## 2020-09-03 RX ORDER — MORPHINE SULFATE 2 MG/ML
4 INJECTION, SOLUTION INTRAMUSCULAR; INTRAVENOUS
Status: DISCONTINUED | OUTPATIENT
Start: 2020-09-03 | End: 2020-09-05

## 2020-09-03 RX ORDER — LISINOPRIL 10 MG/1
10 TABLET ORAL DAILY
Status: DISCONTINUED | OUTPATIENT
Start: 2020-09-04 | End: 2020-09-09 | Stop reason: HOSPADM

## 2020-09-03 RX ORDER — TRIAMTERENE AND HYDROCHLOROTHIAZIDE 37.5; 25 MG/1; MG/1
1 TABLET ORAL DAILY
Status: DISCONTINUED | OUTPATIENT
Start: 2020-09-04 | End: 2020-09-09 | Stop reason: HOSPADM

## 2020-09-03 RX ORDER — LABETALOL 20 MG/4 ML (5 MG/ML) INTRAVENOUS SYRINGE
5 EVERY 10 MIN PRN
Status: DISCONTINUED | OUTPATIENT
Start: 2020-09-03 | End: 2020-09-03 | Stop reason: HOSPADM

## 2020-09-03 RX ORDER — ROPIVACAINE HYDROCHLORIDE 5 MG/ML
30 INJECTION, SOLUTION EPIDURAL; INFILTRATION; PERINEURAL ONCE
Status: DISCONTINUED | OUTPATIENT
Start: 2020-09-03 | End: 2020-09-09 | Stop reason: HOSPADM

## 2020-09-03 RX ORDER — HYDROCODONE BITARTRATE AND ACETAMINOPHEN 5; 325 MG/1; MG/1
2 TABLET ORAL EVERY 4 HOURS PRN
Status: DISCONTINUED | OUTPATIENT
Start: 2020-09-03 | End: 2020-09-09 | Stop reason: HOSPADM

## 2020-09-03 RX ORDER — SODIUM CHLORIDE 0.9 % (FLUSH) 0.9 %
10 SYRINGE (ML) INJECTION PRN
Status: DISCONTINUED | OUTPATIENT
Start: 2020-09-03 | End: 2020-09-09 | Stop reason: HOSPADM

## 2020-09-03 RX ORDER — PROMETHAZINE HYDROCHLORIDE 12.5 MG/1
12.5 TABLET ORAL EVERY 6 HOURS PRN
Status: DISCONTINUED | OUTPATIENT
Start: 2020-09-03 | End: 2020-09-09 | Stop reason: HOSPADM

## 2020-09-03 RX ORDER — SUCCINYLCHOLINE/SOD CL,ISO/PF 200MG/10ML
SYRINGE (ML) INTRAVENOUS PRN
Status: DISCONTINUED | OUTPATIENT
Start: 2020-09-03 | End: 2020-09-03 | Stop reason: SDUPTHER

## 2020-09-03 RX ORDER — FENTANYL CITRATE 50 UG/ML
25 INJECTION, SOLUTION INTRAMUSCULAR; INTRAVENOUS EVERY 5 MIN PRN
Status: DISCONTINUED | OUTPATIENT
Start: 2020-09-03 | End: 2020-09-03 | Stop reason: HOSPADM

## 2020-09-03 RX ORDER — MIDAZOLAM HYDROCHLORIDE 1 MG/ML
2 INJECTION INTRAMUSCULAR; INTRAVENOUS ONCE
Status: COMPLETED | OUTPATIENT
Start: 2020-09-03 | End: 2020-09-03

## 2020-09-03 RX ORDER — ONDANSETRON 2 MG/ML
4 INJECTION INTRAMUSCULAR; INTRAVENOUS EVERY 6 HOURS PRN
Status: DISCONTINUED | OUTPATIENT
Start: 2020-09-03 | End: 2020-09-09 | Stop reason: HOSPADM

## 2020-09-03 RX ORDER — PROPOFOL 10 MG/ML
INJECTION, EMULSION INTRAVENOUS PRN
Status: DISCONTINUED | OUTPATIENT
Start: 2020-09-03 | End: 2020-09-03 | Stop reason: SDUPTHER

## 2020-09-03 RX ORDER — LIDOCAINE HYDROCHLORIDE 20 MG/ML
INJECTION, SOLUTION INTRAVENOUS PRN
Status: DISCONTINUED | OUTPATIENT
Start: 2020-09-03 | End: 2020-09-03 | Stop reason: SDUPTHER

## 2020-09-03 RX ORDER — DULOXETIN HYDROCHLORIDE 30 MG/1
30 CAPSULE, DELAYED RELEASE ORAL DAILY
Status: DISCONTINUED | OUTPATIENT
Start: 2020-09-04 | End: 2020-09-09 | Stop reason: HOSPADM

## 2020-09-03 RX ORDER — MEPERIDINE HYDROCHLORIDE 25 MG/ML
12.5 INJECTION INTRAMUSCULAR; INTRAVENOUS; SUBCUTANEOUS EVERY 5 MIN PRN
Status: DISCONTINUED | OUTPATIENT
Start: 2020-09-03 | End: 2020-09-03 | Stop reason: HOSPADM

## 2020-09-03 RX ADMIN — PHENYLEPHRINE HYDROCHLORIDE 80 MCG: 10 INJECTION, SOLUTION INTRAMUSCULAR; INTRAVENOUS; SUBCUTANEOUS at 15:01

## 2020-09-03 RX ADMIN — Medication 160 MG: at 14:16

## 2020-09-03 RX ADMIN — SODIUM CHLORIDE: 9 INJECTION, SOLUTION INTRAVENOUS at 19:07

## 2020-09-03 RX ADMIN — LIDOCAINE HYDROCHLORIDE 50 MG: 20 INJECTION, SOLUTION INTRAVENOUS at 14:15

## 2020-09-03 RX ADMIN — SODIUM CHLORIDE, SODIUM LACTATE, POTASSIUM CHLORIDE, AND CALCIUM CHLORIDE: 600; 310; 30; 20 INJECTION, SOLUTION INTRAVENOUS at 13:47

## 2020-09-03 RX ADMIN — ROCURONIUM BROMIDE 5 MG: 10 INJECTION INTRAVENOUS at 14:15

## 2020-09-03 RX ADMIN — GLYCOPYRROLATE 0.2 MG: 0.2 INJECTION INTRAMUSCULAR; INTRAVENOUS at 14:15

## 2020-09-03 RX ADMIN — ROCURONIUM BROMIDE 50 MG: 10 INJECTION INTRAVENOUS at 14:59

## 2020-09-03 RX ADMIN — CEFAZOLIN SODIUM 2000 MG: 1 POWDER, FOR SOLUTION INTRAMUSCULAR; INTRAVENOUS at 18:00

## 2020-09-03 RX ADMIN — SODIUM CHLORIDE, POTASSIUM CHLORIDE, SODIUM LACTATE AND CALCIUM CHLORIDE: 600; 310; 30; 20 INJECTION, SOLUTION INTRAVENOUS at 12:13

## 2020-09-03 RX ADMIN — PHENYLEPHRINE HYDROCHLORIDE 240 MCG: 10 INJECTION, SOLUTION INTRAMUSCULAR; INTRAVENOUS; SUBCUTANEOUS at 17:19

## 2020-09-03 RX ADMIN — MIDAZOLAM HYDROCHLORIDE 1 MG: 2 INJECTION, SOLUTION INTRAMUSCULAR; INTRAVENOUS at 14:21

## 2020-09-03 RX ADMIN — SODIUM CHLORIDE, SODIUM LACTATE, POTASSIUM CHLORIDE, AND CALCIUM CHLORIDE: 600; 310; 30; 20 INJECTION, SOLUTION INTRAVENOUS at 17:09

## 2020-09-03 RX ADMIN — FENTANYL CITRATE 100 MCG: 50 INJECTION INTRAMUSCULAR; INTRAVENOUS at 12:34

## 2020-09-03 RX ADMIN — ONDANSETRON 4 MG: 2 INJECTION INTRAMUSCULAR; INTRAVENOUS at 18:02

## 2020-09-03 RX ADMIN — CEFAZOLIN 3 G: 10 INJECTION, POWDER, FOR SOLUTION INTRAVENOUS at 14:11

## 2020-09-03 RX ADMIN — SODIUM CHLORIDE, SODIUM LACTATE, POTASSIUM CHLORIDE, AND CALCIUM CHLORIDE: 600; 310; 30; 20 INJECTION, SOLUTION INTRAVENOUS at 18:06

## 2020-09-03 RX ADMIN — PHENYLEPHRINE HYDROCHLORIDE 40 MCG: 10 INJECTION, SOLUTION INTRAMUSCULAR; INTRAVENOUS; SUBCUTANEOUS at 15:22

## 2020-09-03 RX ADMIN — ROCURONIUM BROMIDE 20 MG: 10 INJECTION INTRAVENOUS at 16:38

## 2020-09-03 RX ADMIN — MIDAZOLAM HYDROCHLORIDE 2 MG: 2 INJECTION, SOLUTION INTRAMUSCULAR; INTRAVENOUS at 12:25

## 2020-09-03 RX ADMIN — CEFAZOLIN 3 G: 10 INJECTION, POWDER, FOR SOLUTION INTRAVENOUS at 23:03

## 2020-09-03 RX ADMIN — SUGAMMADEX 200 MG: 100 INJECTION, SOLUTION INTRAVENOUS at 18:30

## 2020-09-03 RX ADMIN — ROPIVACAINE HYDROCHLORIDE 30 ML: 5 INJECTION, SOLUTION EPIDURAL; INFILTRATION; PERINEURAL at 12:33

## 2020-09-03 RX ADMIN — HYDROCODONE BITARTRATE AND ACETAMINOPHEN 2 TABLET: 5; 325 TABLET ORAL at 23:25

## 2020-09-03 RX ADMIN — FENTANYL CITRATE 50 MCG: 50 INJECTION INTRAMUSCULAR; INTRAVENOUS at 17:38

## 2020-09-03 RX ADMIN — PROCHLORPERAZINE EDISYLATE 5 MG: 5 INJECTION INTRAMUSCULAR; INTRAVENOUS at 19:07

## 2020-09-03 RX ADMIN — FENTANYL CITRATE 50 MCG: 50 INJECTION INTRAMUSCULAR; INTRAVENOUS at 17:03

## 2020-09-03 RX ADMIN — FENTANYL CITRATE 50 MCG: 50 INJECTION INTRAMUSCULAR; INTRAVENOUS at 17:53

## 2020-09-03 RX ADMIN — FENTANYL CITRATE 50 MCG: 50 INJECTION INTRAMUSCULAR; INTRAVENOUS at 14:59

## 2020-09-03 RX ADMIN — PROPOFOL 200 MG: 10 INJECTION, EMULSION INTRAVENOUS at 14:16

## 2020-09-03 RX ADMIN — ROCURONIUM BROMIDE 45 MG: 10 INJECTION INTRAVENOUS at 14:23

## 2020-09-03 RX ADMIN — MIDAZOLAM HYDROCHLORIDE 1 MG: 2 INJECTION, SOLUTION INTRAMUSCULAR; INTRAVENOUS at 14:12

## 2020-09-03 RX ADMIN — PROPOFOL 50 MG: 10 INJECTION, EMULSION INTRAVENOUS at 14:22

## 2020-09-03 RX ADMIN — GLYCOPYRROLATE 0.3 MG: 0.2 INJECTION INTRAMUSCULAR; INTRAVENOUS at 16:03

## 2020-09-03 RX ADMIN — ROCURONIUM BROMIDE 30 MG: 10 INJECTION INTRAVENOUS at 16:05

## 2020-09-03 RX ADMIN — FENTANYL CITRATE 100 MCG: 50 INJECTION INTRAMUSCULAR; INTRAVENOUS at 18:03

## 2020-09-03 RX ADMIN — PHENYLEPHRINE HYDROCHLORIDE 40 MCG: 10 INJECTION, SOLUTION INTRAMUSCULAR; INTRAVENOUS; SUBCUTANEOUS at 15:39

## 2020-09-03 ASSESSMENT — PULMONARY FUNCTION TESTS
PIF_VALUE: 4
PIF_VALUE: 28
PIF_VALUE: 29
PIF_VALUE: 6
PIF_VALUE: 27
PIF_VALUE: 27
PIF_VALUE: 26
PIF_VALUE: 28
PIF_VALUE: 27
PIF_VALUE: 28
PIF_VALUE: 30
PIF_VALUE: 27
PIF_VALUE: 27
PIF_VALUE: 28
PIF_VALUE: 27
PIF_VALUE: 27
PIF_VALUE: 28
PIF_VALUE: 27
PIF_VALUE: 30
PIF_VALUE: 1
PIF_VALUE: 46
PIF_VALUE: 28
PIF_VALUE: 28
PIF_VALUE: 27
PIF_VALUE: 2
PIF_VALUE: 26
PIF_VALUE: 30
PIF_VALUE: 27
PIF_VALUE: 6
PIF_VALUE: 28
PIF_VALUE: 27
PIF_VALUE: 21
PIF_VALUE: 27
PIF_VALUE: 29
PIF_VALUE: 28
PIF_VALUE: 27
PIF_VALUE: 28
PIF_VALUE: 6
PIF_VALUE: 4
PIF_VALUE: 29
PIF_VALUE: 28
PIF_VALUE: 36
PIF_VALUE: 28
PIF_VALUE: 28
PIF_VALUE: 26
PIF_VALUE: 6
PIF_VALUE: 5
PIF_VALUE: 27
PIF_VALUE: 29
PIF_VALUE: 26
PIF_VALUE: 27
PIF_VALUE: 6
PIF_VALUE: 28
PIF_VALUE: 27
PIF_VALUE: 26
PIF_VALUE: 27
PIF_VALUE: 26
PIF_VALUE: 28
PIF_VALUE: 27
PIF_VALUE: 28
PIF_VALUE: 27
PIF_VALUE: 27
PIF_VALUE: 28
PIF_VALUE: 30
PIF_VALUE: 29
PIF_VALUE: 27
PIF_VALUE: 28
PIF_VALUE: 27
PIF_VALUE: 32
PIF_VALUE: 28
PIF_VALUE: 26
PIF_VALUE: 28
PIF_VALUE: 28
PIF_VALUE: 5
PIF_VALUE: 28
PIF_VALUE: 28
PIF_VALUE: 27
PIF_VALUE: 28
PIF_VALUE: 20
PIF_VALUE: 27
PIF_VALUE: 20
PIF_VALUE: 27
PIF_VALUE: 29
PIF_VALUE: 28
PIF_VALUE: 27
PIF_VALUE: 29
PIF_VALUE: 36
PIF_VALUE: 28
PIF_VALUE: 2
PIF_VALUE: 27
PIF_VALUE: 27
PIF_VALUE: 30
PIF_VALUE: 1
PIF_VALUE: 27
PIF_VALUE: 29
PIF_VALUE: 27
PIF_VALUE: 36
PIF_VALUE: 28
PIF_VALUE: 26
PIF_VALUE: 27
PIF_VALUE: 27
PIF_VALUE: 29
PIF_VALUE: 27
PIF_VALUE: 29
PIF_VALUE: 27
PIF_VALUE: 30
PIF_VALUE: 28
PIF_VALUE: 21
PIF_VALUE: 30
PIF_VALUE: 27
PIF_VALUE: 31
PIF_VALUE: 27
PIF_VALUE: 28
PIF_VALUE: 1
PIF_VALUE: 27
PIF_VALUE: 36
PIF_VALUE: 27
PIF_VALUE: 30
PIF_VALUE: 27
PIF_VALUE: 5
PIF_VALUE: 7
PIF_VALUE: 27
PIF_VALUE: 32
PIF_VALUE: 27
PIF_VALUE: 26
PIF_VALUE: 27
PIF_VALUE: 28
PIF_VALUE: 28
PIF_VALUE: 27
PIF_VALUE: 28
PIF_VALUE: 26
PIF_VALUE: 28
PIF_VALUE: 28
PIF_VALUE: 27
PIF_VALUE: 28
PIF_VALUE: 27
PIF_VALUE: 26
PIF_VALUE: 28
PIF_VALUE: 20
PIF_VALUE: 30
PIF_VALUE: 29
PIF_VALUE: 28
PIF_VALUE: 27
PIF_VALUE: 26
PIF_VALUE: 28
PIF_VALUE: 27
PIF_VALUE: 18
PIF_VALUE: 27
PIF_VALUE: 28
PIF_VALUE: 26
PIF_VALUE: 27
PIF_VALUE: 26
PIF_VALUE: 30
PIF_VALUE: 5
PIF_VALUE: 28
PIF_VALUE: 30
PIF_VALUE: 4
PIF_VALUE: 6
PIF_VALUE: 28
PIF_VALUE: 6
PIF_VALUE: 28
PIF_VALUE: 26
PIF_VALUE: 27
PIF_VALUE: 28
PIF_VALUE: 1
PIF_VALUE: 32
PIF_VALUE: 29
PIF_VALUE: 27
PIF_VALUE: 28
PIF_VALUE: 29
PIF_VALUE: 27
PIF_VALUE: 28
PIF_VALUE: 27
PIF_VALUE: 26
PIF_VALUE: 27
PIF_VALUE: 27
PIF_VALUE: 25
PIF_VALUE: 27
PIF_VALUE: 28
PIF_VALUE: 27
PIF_VALUE: 28
PIF_VALUE: 28
PIF_VALUE: 27
PIF_VALUE: 29
PIF_VALUE: 21
PIF_VALUE: 28
PIF_VALUE: 27
PIF_VALUE: 27
PIF_VALUE: 26
PIF_VALUE: 29
PIF_VALUE: 30
PIF_VALUE: 28
PIF_VALUE: 7
PIF_VALUE: 27
PIF_VALUE: 27
PIF_VALUE: 28
PIF_VALUE: 26
PIF_VALUE: 30
PIF_VALUE: 27
PIF_VALUE: 6
PIF_VALUE: 27
PIF_VALUE: 28
PIF_VALUE: 32
PIF_VALUE: 27
PIF_VALUE: 57
PIF_VALUE: 28
PIF_VALUE: 28
PIF_VALUE: 27
PIF_VALUE: 27
PIF_VALUE: 28
PIF_VALUE: 28
PIF_VALUE: 5
PIF_VALUE: 7
PIF_VALUE: 2
PIF_VALUE: 27
PIF_VALUE: 26
PIF_VALUE: 5
PIF_VALUE: 27
PIF_VALUE: 28
PIF_VALUE: 5
PIF_VALUE: 7
PIF_VALUE: 30
PIF_VALUE: 29
PIF_VALUE: 7
PIF_VALUE: 28
PIF_VALUE: 28
PIF_VALUE: 33
PIF_VALUE: 27
PIF_VALUE: 30
PIF_VALUE: 29
PIF_VALUE: 30
PIF_VALUE: 5
PIF_VALUE: 30
PIF_VALUE: 27
PIF_VALUE: 28
PIF_VALUE: 27
PIF_VALUE: 29
PIF_VALUE: 27
PIF_VALUE: 27
PIF_VALUE: 28
PIF_VALUE: 28
PIF_VALUE: 26
PIF_VALUE: 27
PIF_VALUE: 26
PIF_VALUE: 27

## 2020-09-03 ASSESSMENT — PAIN - FUNCTIONAL ASSESSMENT
PAIN_FUNCTIONAL_ASSESSMENT: PREVENTS OR INTERFERES SOME ACTIVE ACTIVITIES AND ADLS
PAIN_FUNCTIONAL_ASSESSMENT: 0-10

## 2020-09-03 ASSESSMENT — LIFESTYLE VARIABLES: SMOKING_STATUS: 0

## 2020-09-03 ASSESSMENT — PAIN DESCRIPTION - LOCATION: LOCATION: ANKLE

## 2020-09-03 ASSESSMENT — PAIN SCALES - GENERAL: PAINLEVEL_OUTOF10: 7

## 2020-09-03 ASSESSMENT — PAIN DESCRIPTION - PROGRESSION: CLINICAL_PROGRESSION: GRADUALLY WORSENING

## 2020-09-03 ASSESSMENT — PAIN DESCRIPTION - ORIENTATION: ORIENTATION: RIGHT

## 2020-09-03 ASSESSMENT — PAIN DESCRIPTION - PAIN TYPE: TYPE: SURGICAL PAIN

## 2020-09-03 ASSESSMENT — PAIN DESCRIPTION - DESCRIPTORS: DESCRIPTORS: SHARP

## 2020-09-03 ASSESSMENT — PAIN DESCRIPTION - ONSET: ONSET: ON-GOING

## 2020-09-03 ASSESSMENT — PAIN DESCRIPTION - FREQUENCY: FREQUENCY: CONTINUOUS

## 2020-09-03 NOTE — PROGRESS NOTES
Anesthesia here to do block. O2 placed. Start time:1216  Stop time:1236  Tolerated Block with sedation    See flow sheet for vital signs.

## 2020-09-03 NOTE — ANESTHESIA PRE PROCEDURE
Department of Anesthesiology  Preprocedure Note       Name:  Wilfrido Bravo   Age:  54 y.o.  :  1970                                          MRN:  [id number]         Date:  9/3/2020      Surgeon: Phu Cruz):  Tyree Fleming MD    Procedure: Procedure(s):  RIGHT PANTALAR  ARTHRODESIS, GASTROCNEMIUS RECESSION;  POSSIBLE TENDO-ACHILLES LENGTHENING, POSSIBLE RELEASE OF MEDIAL ANKLE TENDONS (MULIPLE)    Medications prior to admission:   Prior to Admission medications    Medication Sig Start Date End Date Taking? Authorizing Provider   GABAPENTIN PO Take by mouth   Yes Historical Provider, MD   benazepril (LOTENSIN) 10 MG tablet Take 10 mg by mouth daily 20  Yes Historical Provider, MD   metFORMIN (GLUCOPHAGE-XR) 500 MG extended release tablet Take 500 mg by mouth daily (with breakfast) 20  Yes Historical Provider, MD   triamterene-hydroCHLOROthiazide (MAXZIDE-25) 37.5-25 MG per tablet Take 1 tablet by mouth daily 20  Yes Historical Provider, MD   DULoxetine (CYMBALTA) 30 MG extended release capsule Take 30 mg by mouth daily   Yes Historical Provider, MD       Current medications:    Current Facility-Administered Medications   Medication Dose Route Frequency Provider Last Rate Last Dose    lactated ringers infusion   Intravenous Continuous Tyree Fleming MD        ceFAZolin (ANCEF) 3 g in dextrose 5 % 100 mL IVPB  3 g Intravenous Once Tyree Fleming MD        sodium chloride flush 0.9 % injection 10 mL  10 mL Intravenous 2 times per day Wells Albin, DO        sodium chloride flush 0.9 % injection 10 mL  10 mL Intravenous PRN Wells Albin, DO        0.9 % sodium chloride infusion   Intravenous Continuous Wells [place], DO        lactated ringers infusion   Intravenous Continuous Wells [place], DO           Allergies:  No Known Allergies    Problem List:  There is no problem list on file for this patient.       Past Medical History:        Diagnosis Date    Anesthesia complication [facility]  fighting    Diabetes mellitus ([facility] [address]     Hypertension        Past Surgical History:        Procedure Laterality Date    BACK SURGERY      fusion    FOOT SURGERY Bilateral     UPPP UVULOPALATOPHARYGOPLASTY         Social History:    Social History     Tobacco Use    Smoking status: Never Smoker    Smokeless tobacco: Never Used   Substance Use Topics    Alcohol use: Yes     Comment: occ                                Counseling given: Not Answered      Vital Signs (Current):   Vitals:    09/01/20 1415 09/03/20 1058 09/03/20 1114   BP:  (!) 155/93    Pulse:  87    Resp:  18    Temp:  98 °F (36.7 °C)    TempSrc:  Oral    Weight: (!) 435 lb (197.3 kg) (!) 432 lb (196 kg) (!) 430 lb 9.6 oz (195.3 kg)   Height: 6' 1\" (1.854 m) 6' 1\" (1.854 m)                                               BP Readings from Last 3 Encounters:   09/03/20 (!) 155/93       NPO Status: Time of last liquid consumption: 2230                        Time of last solid consumption: 2230                        Date of last liquid consumption: 09/02/20                        Date of last solid food consumption: 09/02/20    BMI:   Wt Readings from Last 3 Encounters:   09/03/20 (!) 430 lb 9.6 oz (195.3 kg)     Body mass index is 56.81 kg/m². CBC: No results found for: WBC, RBC, HGB, HCT, MCV, RDW, PLT    CMP: No results found for: NA, K, CL, CO2, BUN, CREATININE, GFRAA, AGRATIO, LABGLOM, GLUCOSE, PROT, CALCIUM, BILITOT, ALKPHOS, AST, ALT    POC Tests: No results for input(s): POCGLU, POCNA, POCK, POCCL, POCBUN, POCHEMO, POCHCT in the last 72 hours.     Coags: No results found for: PROTIME, INR, APTT    HCG (If Applicable): No results found for: PREGTESTUR, PREGSERUM, HCG, HCGQUANT     ABGs: No results found for: PHART, PO2ART, FQX7TCK, YLM6ZIQ, BEART, T2AEDMGS     Type & Screen (If Applicable):  No results found for: LABABO, LABRH    Drug/Infectious Status (If Applicable):  No results found for: HIV, HEPCAB    COVID-19 Screening (If Applicable):   Lab Results   Component Value Date    COVID19 NOT DETECTED 08/28/2020         Anesthesia Evaluation    Airway: Mallampati: II  TM distance: >3 FB   Neck ROM: full  Mouth opening: > = 3 FB Dental:          Pulmonary:   (+) sleep apnea:      (-) asthma (has inhalers for when he gets bronchitis) and not a current smoker                           Cardiovascular:  Exercise tolerance: poor (<4 METS),   (+) hypertension:,     (-) past MI and  angina                Neuro/Psych:      (-) seizures and CVA           GI/Hepatic/Renal:   (+) GERD:,      (-) liver disease and no renal disease       Endo/Other:    (+) DiabetesType II DM, , .                 Abdominal:           Vascular:                                        Anesthesia Plan      general     ASA 4     (-npo MN  -denies chest pain or palp. Very little walking in some time due to ankle instability. IN wheelchair since April  )  Induction: intravenous. MIPS: Postoperative opioids intended. Anesthetic plan and risks discussed with patient. Plan discussed with CRNA.     Attending anesthesiologist reviewed and agrees with Pre Eval content              Ruben Tiwari MD   9/3/2020

## 2020-09-03 NOTE — H&P
Lifestyle    Physical activity     Days per week: None     Minutes per session: None    Stress: None   Relationships    Social connections     Talks on phone: None     Gets together: None     Attends Rastafari service: None     Active member of club or organization: None     Attends meetings of clubs or organizations: None     Relationship status: None    Intimate partner violence     Fear of current or ex partner: None     Emotionally abused: None     Physically abused: None     Forced sexual activity: None   Other Topics Concern    None   Social History Narrative    None         Medications Prior to Admission:      Prior to Admission medications    Medication Sig Start Date End Date Taking? Authorizing Provider   GABAPENTIN PO Take by mouth   Yes Historical Provider, MD   benazepril (LOTENSIN) 10 MG tablet Take 10 mg by mouth daily 8/14/20  Yes Historical Provider, MD   metFORMIN (GLUCOPHAGE-XR) 500 MG extended release tablet Take 500 mg by mouth daily (with breakfast) 4/23/20  Yes Historical Provider, MD   triamterene-hydroCHLOROthiazide (MAXZIDE-25) 37.5-25 MG per tablet Take 1 tablet by mouth daily 4/23/20  Yes Historical Provider, MD   DULoxetine (CYMBALTA) 30 MG extended release capsule Take 30 mg by mouth daily   Yes Historical Provider, MD         Allergies:  Patient has no known allergies. PHYSICAL EXAM:      BP (!) 155/93   Pulse 87   Temp 98 °F (36.7 °C) (Oral)   Resp 18   Ht 6' 1\" (1.854 m)   Wt (!) 430 lb 9.6 oz (195.3 kg)   BMI 56.81 kg/m²      Airway:  Airway patent with no audible stridor    Heart:  Regular rate and rhythm, No murmur noted    Lungs:  No increased work of breathing, good air exchange, clear to auscultation bilaterally, no crackles or wheezing    Abdomen:  Soft, non-distended, non-tender, normal active bowel sounds, no masses palpated    ASSESSMENT AND PLAN    Patient is a 54 y.o. male with above specified procedure planned.     1.  Patient seen and focused exam done today- no new changes since last physical exam on 8/31/20    2. Access to ancillary services are available per request of the provider.     LANDRY Baker - CNP     9/3/2020

## 2020-09-03 NOTE — ANESTHESIA PROCEDURE NOTES
Peripheral Block    Patient location during procedure: pre-op  Start time: 9/3/2020 12:20 PM  End time: 9/3/2020 12:23 PM  Staffing  Anesthesiologist: Jewell Palm MD  Performed: anesthesiologist   Preanesthetic Checklist  Completed: patient identified, site marked, surgical consent, pre-op evaluation, timeout performed, IV checked, risks and benefits discussed, monitors and equipment checked, anesthesia consent given, oxygen available and patient being monitored  Peripheral Block  Patient position: supine  Prep: ChloraPrep  Patient monitoring: cardiac monitor, continuous pulse ox, frequent blood pressure checks and IV access  Block type: Sciatic  Laterality: right  Injection technique: single-shot  Procedures: ultrasound guided  Infiltration strength: 1 %  Dose: 3 mL  Popliteal  Provider prep: mask and sterile gloves  Needle  Needle type: combined needle/nerve stimulator   Needle gauge: 21 G  Needle length: 10 cm  Needle localization: ultrasound guidance  Assessment  Injection assessment: negative aspiration for heme, no paresthesia on injection and local visualized surrounding nerve on ultrasound  Paresthesia pain: none  Slow fractionated injection: yes  Hemodynamics: stable  Additional Notes  Immediately prior to procedure a \"time out\" was called to verify the correct patient, allergies, laterality, procedure and equipment. Time out performed with rex YEPEZ    Local Anesthetic: 0.5 %  Bupivacaine   Amount: 30 ml  in 5 ml increments after negative aspiration each time. Biceps Femoris muscle (long head), Vastus lateralis muscle, Sciatic nerve (Tibia and Common Peroneal Nerves) and Popliteal artery are identified; the tip of the needle and the spread of the local anesthetic around the Tibial and Common Peroneal Nerve are visualized. The Sciatic Nerve (Tibia and Common Peroneal Nerve) appeared to be anatomically normal and there were no abnormal pathologically findings seen.          Medications Administered  Ropivacaine (NAROPIN) injection 0.5%, 30 mL  Reason for block: post-op pain management and at surgeon's request

## 2020-09-04 LAB
GLUCOSE BLD-MCNC: 118 MG/DL (ref 70–99)
GLUCOSE BLD-MCNC: 134 MG/DL (ref 70–99)
GLUCOSE BLD-MCNC: 141 MG/DL (ref 70–99)
GLUCOSE BLD-MCNC: 149 MG/DL (ref 70–99)
MRSA SCREEN RT-PCR: NORMAL
PERFORMED ON: ABNORMAL

## 2020-09-04 PROCEDURE — 6370000000 HC RX 637 (ALT 250 FOR IP): Performed by: ORTHOPAEDIC SURGERY

## 2020-09-04 PROCEDURE — 51798 US URINE CAPACITY MEASURE: CPT

## 2020-09-04 PROCEDURE — 97530 THERAPEUTIC ACTIVITIES: CPT

## 2020-09-04 PROCEDURE — 97162 PT EVAL MOD COMPLEX 30 MIN: CPT

## 2020-09-04 PROCEDURE — 94150 VITAL CAPACITY TEST: CPT

## 2020-09-04 PROCEDURE — 6370000000 HC RX 637 (ALT 250 FOR IP): Performed by: INTERNAL MEDICINE

## 2020-09-04 PROCEDURE — 1200000000 HC SEMI PRIVATE

## 2020-09-04 PROCEDURE — 2580000003 HC RX 258: Performed by: ORTHOPAEDIC SURGERY

## 2020-09-04 PROCEDURE — 6360000002 HC RX W HCPCS: Performed by: ORTHOPAEDIC SURGERY

## 2020-09-04 PROCEDURE — 97166 OT EVAL MOD COMPLEX 45 MIN: CPT

## 2020-09-04 PROCEDURE — 97535 SELF CARE MNGMENT TRAINING: CPT

## 2020-09-04 PROCEDURE — 51701 INSERT BLADDER CATHETER: CPT

## 2020-09-04 RX ORDER — TAMSULOSIN HYDROCHLORIDE 0.4 MG/1
0.4 CAPSULE ORAL DAILY
Status: DISCONTINUED | OUTPATIENT
Start: 2020-09-04 | End: 2020-09-09 | Stop reason: HOSPADM

## 2020-09-04 RX ADMIN — HYDROCODONE BITARTRATE AND ACETAMINOPHEN 2 TABLET: 5; 325 TABLET ORAL at 23:20

## 2020-09-04 RX ADMIN — LISINOPRIL 10 MG: 10 TABLET ORAL at 08:41

## 2020-09-04 RX ADMIN — HYDROCODONE BITARTRATE AND ACETAMINOPHEN 2 TABLET: 5; 325 TABLET ORAL at 06:04

## 2020-09-04 RX ADMIN — MORPHINE SULFATE 4 MG: 2 INJECTION, SOLUTION INTRAMUSCULAR; INTRAVENOUS at 20:19

## 2020-09-04 RX ADMIN — HYDROCODONE BITARTRATE AND ACETAMINOPHEN 2 TABLET: 5; 325 TABLET ORAL at 15:09

## 2020-09-04 RX ADMIN — HYDROCODONE BITARTRATE AND ACETAMINOPHEN 2 TABLET: 5; 325 TABLET ORAL at 10:11

## 2020-09-04 RX ADMIN — MORPHINE SULFATE 4 MG: 2 INJECTION, SOLUTION INTRAMUSCULAR; INTRAVENOUS at 13:06

## 2020-09-04 RX ADMIN — CEFAZOLIN 3 G: 10 INJECTION, POWDER, FOR SOLUTION INTRAVENOUS at 07:25

## 2020-09-04 RX ADMIN — RIVAROXABAN 10 MG: 10 TABLET, FILM COATED ORAL at 18:26

## 2020-09-04 RX ADMIN — GABAPENTIN 100 MG: 100 CAPSULE ORAL at 20:21

## 2020-09-04 RX ADMIN — TAMSULOSIN HYDROCHLORIDE 0.4 MG: 0.4 CAPSULE ORAL at 15:09

## 2020-09-04 RX ADMIN — GABAPENTIN 100 MG: 100 CAPSULE ORAL at 15:09

## 2020-09-04 RX ADMIN — Medication 10 ML: at 20:21

## 2020-09-04 RX ADMIN — DULOXETINE HYDROCHLORIDE 30 MG: 30 CAPSULE, DELAYED RELEASE ORAL at 08:42

## 2020-09-04 RX ADMIN — ONDANSETRON 4 MG: 2 INJECTION INTRAMUSCULAR; INTRAVENOUS at 20:29

## 2020-09-04 RX ADMIN — METFORMIN HYDROCHLORIDE 500 MG: 500 TABLET, EXTENDED RELEASE ORAL at 08:41

## 2020-09-04 RX ADMIN — GABAPENTIN 100 MG: 100 CAPSULE ORAL at 08:41

## 2020-09-04 RX ADMIN — TRIAMTERENE AND HYDROCHLOROTHIAZIDE 1 TABLET: 37.5; 25 TABLET ORAL at 08:41

## 2020-09-04 RX ADMIN — HEPARIN SODIUM 5000 UNITS: 5000 INJECTION, SOLUTION INTRAVENOUS; SUBCUTANEOUS at 06:12

## 2020-09-04 RX ADMIN — HYDROCODONE BITARTRATE AND ACETAMINOPHEN 2 TABLET: 5; 325 TABLET ORAL at 19:16

## 2020-09-04 ASSESSMENT — PAIN DESCRIPTION - LOCATION
LOCATION: FOOT
LOCATION: FOOT;LEG
LOCATION: FOOT

## 2020-09-04 ASSESSMENT — PAIN DESCRIPTION - PAIN TYPE
TYPE: SURGICAL PAIN

## 2020-09-04 ASSESSMENT — PAIN DESCRIPTION - PROGRESSION
CLINICAL_PROGRESSION: GRADUALLY WORSENING
CLINICAL_PROGRESSION: NOT CHANGED
CLINICAL_PROGRESSION: GRADUALLY IMPROVING

## 2020-09-04 ASSESSMENT — PAIN SCALES - GENERAL
PAINLEVEL_OUTOF10: 10
PAINLEVEL_OUTOF10: 7
PAINLEVEL_OUTOF10: 8
PAINLEVEL_OUTOF10: 7
PAINLEVEL_OUTOF10: 8
PAINLEVEL_OUTOF10: 7

## 2020-09-04 ASSESSMENT — PAIN DESCRIPTION - DESCRIPTORS
DESCRIPTORS: SHARP
DESCRIPTORS: ACHING;SHARP;SHOOTING
DESCRIPTORS: ACHING;CONSTANT
DESCRIPTORS: ACHING;SHARP
DESCRIPTORS: SHARP

## 2020-09-04 ASSESSMENT — PAIN DESCRIPTION - FREQUENCY
FREQUENCY: CONTINUOUS

## 2020-09-04 ASSESSMENT — PAIN DESCRIPTION - ORIENTATION
ORIENTATION: RIGHT

## 2020-09-04 ASSESSMENT — PAIN DESCRIPTION - ONSET
ONSET: ON-GOING
ONSET: ON-GOING
ONSET: SUDDEN
ONSET: ON-GOING

## 2020-09-04 ASSESSMENT — PAIN - FUNCTIONAL ASSESSMENT
PAIN_FUNCTIONAL_ASSESSMENT: PREVENTS OR INTERFERES SOME ACTIVE ACTIVITIES AND ADLS

## 2020-09-04 NOTE — PROGRESS NOTES
Patient was bladder scanned for 495 mL. Internal med was Perfect Served with no response. Will let day crew know about inability to void.

## 2020-09-04 NOTE — PROGRESS NOTES
Physical Therapy    Facility/Department: [facility] [address]  Initial Assessment and Treatment    NAME: Goyo Paez  : 1970  MRN: [id number]    Date of Service: 2020    Discharge Recommendations:Aime Ocampo scored a 1424 on the AM-PAC short mobility form. Current research shows that an AM-PAC score of 18 or greater is typically associated with a discharge to the patient's home setting. Based on the patient's AM-PAC score and their current functional mobility deficits, it is recommended that the patient have 2-3 sessions per week of Physical Therapy at d/c to increase the patient's independence. At this time, this patient demonstrates the endurance and safety to discharge home with home PT and a follow up treatment frequency of 2-3x/wk. Please see assessment section for further patient specific details. If patient discharges prior to next session this note will serve as a discharge summary. Please see below for the latest assessment towards goals. PT Equipment Recommendations  Equipment Needed: Yes(bariatric rolling walker)    Assessment   Body structures, Functions, Activity limitations: Decreased functional mobility ; Decreased endurance;Decreased balance  Assessment: Pt with decreased independent mobility from baseline s/p RIGHT PANTALAR  ARTHRODESIS, GASTROCNEMIUS RECESSION;TENDO-ACHILLES LENGTHENING, RELEASE OF MEDIAL ANKLE TENDONS (MULTIPLE). Pt reports has been using crutches/wheelchair since April with some assist from son. Currently needing assist x 2 to take a few steps with crutches. Improved stability with walker. Able to transfer bed <> chair with CG/min assist NWB RLE.   Pt plans to return home with family assist.  Rec home with 24 hr assist and cont skilled PT to maximize mobilitly and independence  Treatment Diagnosis: impaired functional mobility 2/2 decreased balance and endurance  Decision Making: Medium Complexity  PT Education: Goals;PT Role;Plan of Care;General : 38.1 (09/04/20 1507)  Mobility Inpatient CMS 0-100% Score: 61.29 (09/04/20 1507)  Mobility Inpatient CMS G-Code Modifier : CL (09/04/20 1507)          Goals  Short term goals  Time Frame for Short term goals: By discharge  Short term goal 1: Sup to sit modified independent  Short term goal 2: Pt will transfer sit to stand SBA NWB RLE with LRAD  Short term goal 3: Pt will amb 5' with LRAD NWB RLE SBA  Short term goal 4: Pt will up/down curb step with LRAD NWB RLE CGA       Therapy Time   Individual Concurrent Group Co-treatment   Time In 1331         Time Out 1409         Minutes 38               Timed Code Treatment Minutes: 23      Total Treatment Minutes:  [address], PT

## 2020-09-04 NOTE — PLAN OF CARE
Problem: Falls - Risk of:  Goal: Will remain free from falls  Description: Will remain free from falls  9/4/2020 1008 by Brandee Ruffin RN  Outcome: Ongoing  9/4/2020 0229 by Albin Blakely RN  Outcome: Ongoing  Note: Fall precautions in place. Bed is in lowest position, wheels locked and alarm on. Non-skid socks on. Call light and bedside table within reach. Pt calls out appropriately. Pt is up x 2 assist. Patient is NWB to the LLE. Will continue to assess and monitor. Problem: Pain:  Goal: Pain level will decrease  Description: Pain level will decrease  9/4/2020 1008 by Brandee Ruffin RN  Outcome: Ongoing  9/4/2020 0229 by Albin Blakely RN  Outcome: Ongoing  Note: Patient uses 0-10 pain scale. He is rating surgical pain in his ankle as a 7 out of 10. Given PRN Vicodin PO with relief. Use of pillows, rest and elevation as non-pharmacological measures. Will continue to assess and monitor.

## 2020-09-04 NOTE — PROGRESS NOTES
Patient unable to void on his own. Patient bladder scanned for 340mL. MD notified and straight cath ordered. Patient straight cathed for 850mL. Patient tolerated well. Will continue to monitor urine output.

## 2020-09-04 NOTE — PROGRESS NOTES
Patient had strike-through of dressing --- this RN placed 4 ABD pads and an ACE wrap to re-enforce the dressing. Will continue to assess and monitor.

## 2020-09-04 NOTE — CARE COORDINATION
Case Management Assessment           Initial Evaluation                Date / Time of Evaluation: 9/4/2020 4:37 PM                 Assessment Completed by: Rhiannon Coronado     I stopped by earlier to speak with patient but he wanted me to come back when his wife was here. He had not worked with therapy yet either. I came back and he told me he was going home at d/c. He will need a rolling walker and I made a referral with [employer] to deliver this to the patient. He also is interested in home care at d/c but lives in [place]. I am trying to get home care arranged for him as well. Currently he is having some urinary retention as well. Patient Name: Mony Evans     YOB: 1970  Diagnosis: Primary osteoarthritis of right ankle [M19.071]  Other acquired deformities of right foot [M21.6X1]  Short Achilles tendon, right [M67.01]  Arthritis of right ankle [M19.071]     Date / Time: 9/3/2020 10:49 AM    Patient Admission Status: Inpatient    If patient is discharged prior to next notation, then this note serves as note for discharge by case management.      Current PCP: [address] [place] [address],[address] Patient: No    Chart Reviewed: Yes  Patient/ Family Interviewed: Yes    Initial assessment completed at bedside with: patient    Hospitalization in the last 30 days: No    Emergency Contacts:  Extended Emergency Contact Information  Primary Emergency Contact: omidheather  Home Phone: [phone]  Relation: Spouse    Advance Directives:   Code Status: Full [address]: No  Agent: NA  Contact Number:NA    Financial  Payor: Bhumika Herr / Plan: Bhumika Herr / Product Type: *No Product type* /     Pre-cert required for SNF: Yes    Pharmacy    [facility] [address], [address] [facility],[address] [phone] [address] [address]  Phone: [phone] Fax: [phone]      Potential assistance Purchasing Medications: Potential Assistance Purchasing patient representative No Balderas and his family were provided with a choice of provider and agrees with the discharge plan Yes    Freedom of choice list was provided with basic dialogue that supports the patient's individualized plan of care/goals and shares the quality data associated with the providers.  Yes    Care Transition patient: No    Matthew Miranda RN  [facility] [employer]  Case Management Department  Ph: [phone]   Fax: [phone]

## 2020-09-04 NOTE — PROGRESS NOTES
Patietn comfortable    A.VSS    NVI distally    Dressing intact    A/P:  Doing well. Needs PT/OT eval.  May need SNF due to immobility/obesity. Await eval and recommendations. Likely D/C tomorrow if cannot be safely returned to home.

## 2020-09-04 NOTE — PROGRESS NOTES
Patient admitted to [address] [place] [address] 79 via bed with all belongings with him. Set of vitals taken with all vital signs stable. 4 - eye skin assessment completed with second RN present. Physical assessment completed. Patient awakens to voice, he is still drowsy from anesthesia. Will continue to assess and monitor.

## 2020-09-04 NOTE — PROGRESS NOTES
Pt's surgical site having some bleeding from heel. Dr. Magdalena Pennington called, stated to just reinforced, he expects some bleeding.

## 2020-09-04 NOTE — PROGRESS NOTES
Patient is alert and oriented x 4. Calls out appropriately. RLE is wrapped in dry dressing, a splint and ACE wrap. Had strike-through down in the PACU, but no drainage noted thus far this admission. Patient notes surgical pain rated a 7 out of 10 in his right ankle. Medicated per [facility] with relief. Tolerating PO fluids adequately. He has yet to void on his own - straight cath'd downstairs in PACU for 700 mL. Vital signs are stable. Will continue to assess and monitor.

## 2020-09-04 NOTE — CONSULTS
HOSPITALISTS CONSULT NOTE    9/4/2020 9:40 AM    Patient Information: Joyce Garland   Date of Admit:  9/3/2020  Primary Care Physician:  Megan Muro  Requesting Physician:  Flores Moody, *    Reason for consult:   Medical evaluation and recommendations for medical comorbidities    Chief complaint:      History of Present Illness:  Joyce Garland is a 54 y.o. male on [employer] [place], * service who was admitted on 9/3/2020 for right ankle surgery. Patient has a past medical history of hypertension diabetes mellitus. Is morbidly obese. Patient complaining of some postsurgical pain. Nursing has been reporting urinary retention. At the time of my examination patient patient slightly drowsy due to pain medication. He is denying any chest pain no difficulty breathing morbidly obese    History obtained from patient and chart review      REVIEW OF SYSTEMS:   Constitutional:  Negative for fever, chills or night sweats  Eyes:  Negative for exudate, itching  Ears:  Negative for tinnitus   Nose:  Negative for rhinorrhea, epistaxis  Mouth/Throat:  Negative for hoarseness, sore throat. Respiratory:   Negative for shortness of breath, wheezing  Cardiovascular: Negative for chest pain, palpitations   Gastrointestinal:  Negative for nausea, vomiting, diarrhea  Genitourinary:  Negative for polyuria, dysuria   Hematologic/Lymphatic:  Negative for  bleeding tendency, easy bruising  Musculoskeletal:  Negative for myalgias, arthralgias  Neurologic:  Negative for  confusion,dysarthria. Skin :  Negative for itching, rash  Psychiatric:  Negative for depression, anxiety. Endocrine:  Negative for polydipsia, polyuria, heat /cold intolerance. Past Medical History:   has a past medical history of Anesthesia complication, Diabetes mellitus ([address] and Hypertension.      Past Surgical History:   has a past surgical history that includes back

## 2020-09-04 NOTE — PROGRESS NOTES
Pt stating that he had to void, given urinal. Pt stated that he could not. Pt bladder scanned, but could not figure out anatomy. Pt's urgency lead to being straight cathed. 700 mLs out.

## 2020-09-04 NOTE — ANESTHESIA POSTPROCEDURE EVALUATION
Department of Anesthesiology  Postprocedure Note    Patient: Hiren Branham  MRN: [id number]  [place]: 1970  Date of evaluation: 9/4/2020  Time:  5:40 AM     Procedure Summary     Date:  09/03/20 Room / Location[facility]  [facility] [id number]    Anesthesia Start:  2957 Anesthesia Stop:  9672    Procedures:       RIGHT PANTALAR  ARTHRODESIS, GASTROCNEMIUS RECESSION; (Right Ankle)      TENDO-ACHILLES LENGTHENING, RELEASE OF MEDIAL ANKLE TENDONS (MULTIPLE) (Right Ankle) Diagnosis:       Primary osteoarthritis of right ankle      Other acquired deformities of right foot      Short Achilles tendon, right      (Primary osteoarthritis of right ankle / foot [M19.071] Other acquired deformities of right foot [M21.6X1] Short Achilles tendon, right ankle [M67.01])    Surgeon:  Kulwant Arce MD Responsible Provider:  Jamie Haro MD    Anesthesia Type:  general ASA Status:  4          Anesthesia Type: general    Addie Phase I: Addie Score: 9    Addie Phase II:      Last vitals: Reviewed and per EMR flowsheets.        Anesthesia Post Evaluation    Patient location during evaluation: PACU  Patient participation: complete - patient participated  Level of consciousness: awake and alert  Pain score: 0  Airway patency: patent  Nausea & Vomiting: no nausea and no vomiting  Complications: no  Cardiovascular status: hemodynamically stable  Respiratory status: acceptable  Hydration status: euvolemic

## 2020-09-04 NOTE — PROGRESS NOTES
PACU Transfer Note    Current Allergies: Patient has no known allergies. Pt meets criteria as per Addie Score and ASPAN Standards to transfer to next phase of care. Recent Labs     09/03/20  1204 09/03/20  1850   POCGLU 97 138*         Vitals:    09/03/20 2030   BP: 136/64   Pulse: 100   Resp: 24   Temp: 97 °F (36.1 °C)   SpO2: 96%        SpO2: 96 %    O2 Flow Rate (L/min): 2 L/min      Intake/Output Summary (Last 24 hours) at 9/3/2020 2039  Last data filed at 9/3/2020 2038  Gross per 24 hour   Intake 2417 ml   Output 1000 ml   Net 1417 ml       Pain assessment:  none    Pain Level: (tolerable pain)    No other skin issues noted. Is patient incontinent: no       Handoff report given at bedside.    Family updated and directed to pt room      9/3/2020 8:39 PM

## 2020-09-05 LAB
GLUCOSE BLD-MCNC: 104 MG/DL (ref 70–99)
GLUCOSE BLD-MCNC: 113 MG/DL (ref 70–99)
GLUCOSE BLD-MCNC: 116 MG/DL (ref 70–99)
GLUCOSE BLD-MCNC: 126 MG/DL (ref 70–99)
PERFORMED ON: ABNORMAL

## 2020-09-05 PROCEDURE — 36415 COLL VENOUS BLD VENIPUNCTURE: CPT

## 2020-09-05 PROCEDURE — 83036 HEMOGLOBIN GLYCOSYLATED A1C: CPT

## 2020-09-05 PROCEDURE — 2580000003 HC RX 258: Performed by: ORTHOPAEDIC SURGERY

## 2020-09-05 PROCEDURE — 6370000000 HC RX 637 (ALT 250 FOR IP): Performed by: ORTHOPAEDIC SURGERY

## 2020-09-05 PROCEDURE — 1200000000 HC SEMI PRIVATE

## 2020-09-05 PROCEDURE — 97535 SELF CARE MNGMENT TRAINING: CPT

## 2020-09-05 PROCEDURE — 6360000002 HC RX W HCPCS: Performed by: ORTHOPAEDIC SURGERY

## 2020-09-05 PROCEDURE — 97530 THERAPEUTIC ACTIVITIES: CPT

## 2020-09-05 PROCEDURE — 6370000000 HC RX 637 (ALT 250 FOR IP): Performed by: HOSPITALIST

## 2020-09-05 PROCEDURE — 6370000000 HC RX 637 (ALT 250 FOR IP): Performed by: INTERNAL MEDICINE

## 2020-09-05 PROCEDURE — 6370000000 HC RX 637 (ALT 250 FOR IP): Performed by: PHYSICIAN ASSISTANT

## 2020-09-05 PROCEDURE — 51702 INSERT TEMP BLADDER CATH: CPT

## 2020-09-05 PROCEDURE — 51701 INSERT BLADDER CATHETER: CPT

## 2020-09-05 PROCEDURE — 51798 US URINE CAPACITY MEASURE: CPT

## 2020-09-05 RX ORDER — METHOCARBAMOL 500 MG/1
500 TABLET, FILM COATED ORAL 3 TIMES DAILY PRN
Status: DISCONTINUED | OUTPATIENT
Start: 2020-09-05 | End: 2020-09-09 | Stop reason: HOSPADM

## 2020-09-05 RX ORDER — SENNA PLUS 8.6 MG/1
1 TABLET ORAL 2 TIMES DAILY
Status: DISCONTINUED | OUTPATIENT
Start: 2020-09-05 | End: 2020-09-09 | Stop reason: HOSPADM

## 2020-09-05 RX ADMIN — METHOCARBAMOL 500 MG: 500 TABLET ORAL at 19:28

## 2020-09-05 RX ADMIN — GABAPENTIN 100 MG: 100 CAPSULE ORAL at 20:25

## 2020-09-05 RX ADMIN — HYDROCODONE BITARTRATE AND ACETAMINOPHEN 2 TABLET: 5; 325 TABLET ORAL at 16:27

## 2020-09-05 RX ADMIN — HYDROCODONE BITARTRATE AND ACETAMINOPHEN 2 TABLET: 5; 325 TABLET ORAL at 12:15

## 2020-09-05 RX ADMIN — Medication 10 ML: at 09:34

## 2020-09-05 RX ADMIN — RIVAROXABAN 10 MG: 10 TABLET, FILM COATED ORAL at 19:28

## 2020-09-05 RX ADMIN — MORPHINE SULFATE 4 MG: 2 INJECTION, SOLUTION INTRAMUSCULAR; INTRAVENOUS at 09:32

## 2020-09-05 RX ADMIN — ONDANSETRON 4 MG: 2 INJECTION INTRAMUSCULAR; INTRAVENOUS at 09:32

## 2020-09-05 RX ADMIN — METHOCARBAMOL 500 MG: 500 TABLET ORAL at 13:18

## 2020-09-05 RX ADMIN — DULOXETINE HYDROCHLORIDE 30 MG: 30 CAPSULE, DELAYED RELEASE ORAL at 08:10

## 2020-09-05 RX ADMIN — TRIAMTERENE AND HYDROCHLOROTHIAZIDE 1 TABLET: 37.5; 25 TABLET ORAL at 08:14

## 2020-09-05 RX ADMIN — SENNOSIDES 8.6 MG: 8.6 TABLET, FILM COATED ORAL at 20:25

## 2020-09-05 RX ADMIN — HYDROCODONE BITARTRATE AND ACETAMINOPHEN 2 TABLET: 5; 325 TABLET ORAL at 08:10

## 2020-09-05 RX ADMIN — MORPHINE SULFATE 4 MG: 2 INJECTION, SOLUTION INTRAMUSCULAR; INTRAVENOUS at 01:02

## 2020-09-05 RX ADMIN — TAMSULOSIN HYDROCHLORIDE 0.4 MG: 0.4 CAPSULE ORAL at 08:10

## 2020-09-05 RX ADMIN — METFORMIN HYDROCHLORIDE 500 MG: 500 TABLET, EXTENDED RELEASE ORAL at 08:10

## 2020-09-05 RX ADMIN — GABAPENTIN 100 MG: 100 CAPSULE ORAL at 08:10

## 2020-09-05 RX ADMIN — HYDROCODONE BITARTRATE AND ACETAMINOPHEN 2 TABLET: 5; 325 TABLET ORAL at 20:25

## 2020-09-05 RX ADMIN — HYDROCODONE BITARTRATE AND ACETAMINOPHEN 2 TABLET: 5; 325 TABLET ORAL at 04:07

## 2020-09-05 RX ADMIN — GABAPENTIN 100 MG: 100 CAPSULE ORAL at 15:14

## 2020-09-05 RX ADMIN — Medication 10 ML: at 20:25

## 2020-09-05 RX ADMIN — LISINOPRIL 10 MG: 10 TABLET ORAL at 08:14

## 2020-09-05 ASSESSMENT — PAIN DESCRIPTION - ORIENTATION
ORIENTATION: RIGHT

## 2020-09-05 ASSESSMENT — PAIN DESCRIPTION - PAIN TYPE
TYPE: SURGICAL PAIN

## 2020-09-05 ASSESSMENT — PAIN DESCRIPTION - LOCATION
LOCATION: FOOT

## 2020-09-05 ASSESSMENT — PAIN - FUNCTIONAL ASSESSMENT
PAIN_FUNCTIONAL_ASSESSMENT: ACTIVITIES ARE NOT PREVENTED
PAIN_FUNCTIONAL_ASSESSMENT: PREVENTS OR INTERFERES SOME ACTIVE ACTIVITIES AND ADLS

## 2020-09-05 ASSESSMENT — PAIN DESCRIPTION - DESCRIPTORS
DESCRIPTORS: BURNING;CONSTANT
DESCRIPTORS: BURNING;CONSTANT;PRESSURE
DESCRIPTORS: ACHING
DESCRIPTORS: BURNING;CONSTANT;PRESSURE
DESCRIPTORS: BURNING;CONSTANT;PRESSURE
DESCRIPTORS: BURNING
DESCRIPTORS: ACHING;DISCOMFORT

## 2020-09-05 ASSESSMENT — PAIN DESCRIPTION - FREQUENCY
FREQUENCY: CONTINUOUS

## 2020-09-05 ASSESSMENT — PAIN SCALES - GENERAL
PAINLEVEL_OUTOF10: 8
PAINLEVEL_OUTOF10: 0
PAINLEVEL_OUTOF10: 7
PAINLEVEL_OUTOF10: 8
PAINLEVEL_OUTOF10: 9
PAINLEVEL_OUTOF10: 7
PAINLEVEL_OUTOF10: 7
PAINLEVEL_OUTOF10: 6
PAINLEVEL_OUTOF10: 5
PAINLEVEL_OUTOF10: 3
PAINLEVEL_OUTOF10: 7
PAINLEVEL_OUTOF10: 3
PAINLEVEL_OUTOF10: 7
PAINLEVEL_OUTOF10: 5

## 2020-09-05 ASSESSMENT — PAIN DESCRIPTION - ONSET
ONSET: ON-GOING
ONSET: ON-GOING
ONSET: PROGRESSIVE
ONSET: ON-GOING

## 2020-09-05 NOTE — PROGRESS NOTES
Patient alert and oriented x4. Calls out appropriately. RLE is wrapped in ace and dry dressing with splint. No strike through noted during this shift. Pain control has improved this evening with ice therapy, PO pain medication, and new order for robaxin. Updated wife this shift. Will continue to monitor.

## 2020-09-05 NOTE — PLAN OF CARE
Problem: Falls - Risk of:  Goal: Will remain free from falls  Description: Will remain free from falls  9/4/2020 2340 by Sybil Barr RN  Outcome: Ongoing  Note: Fall precautions in place. Bed is in lowest position, wheels locked and alarm on. Non-skid socks on. Call light and bedside table within reach. Pt calls out appropriately. Pt is up x 2 assist with a yoandy stedy. Patient is NWB on the right foot. Patient is unsteady with a walker so stedy is preferred. Will continue to assess and monitor. Problem: Pain:  Goal: Pain level will decrease  Description: Pain level will decrease  9/4/2020 2340 by Sybil Barr RN  Outcome: Ongoing  Note: Patient uses 0-10 pain scale. Patient is rating surgical pain in the right foot as a 7 out of 10. Requests oral Vicodin and IV Morphine PRN with relief. Use of elevation, rest and repositioning as non-pharmacological measures. Will continue to assess and monitor.

## 2020-09-05 NOTE — PROGRESS NOTES
Physical Therapy  Facility/Department: [facility] 5T ORTHO/NEURO  Daily Treatment Note  NAME: Candy Powell  : 1970  MRN: [id number]    Date of Service: 2020    Discharge Recommendations:    Candy Powell scored a 14/24 on the AM-PAC short mobility form. Current research shows that an AM-PAC score of 17 or less is typically not associated with a discharge to the patient's home setting. Based on the patient's AM-PAC score and their current functional mobility deficits, it is recommended that the patient have 3-5 sessions per week of Physical Therapy at d/c to increase the patient's independence. Please see assessment section for further patient specific details. If patient discharges prior to next session this note will serve as a discharge summary. Please see below for the latest assessment towards goals. PT Equipment Recommendations  Equipment Needed: (bariatric rolling walker if going home)    Assessment   Body structures, Functions, Activity limitations: Decreased functional mobility ; Decreased endurance;Decreased balance  Assessment: Decreased assist for transfers this date. Able to maintain NWB RLE however mobility is effortful. At risk for falls and not safe to get up alone. Pt having some concerns about returning home. Would benefit from cont skilled PT to maximize mobility and independence  Treatment Diagnosis: impaired functional mobility 2/2 decreased balance and endurance  PT Education: Goals;PT Role;Plan of Care;General Safety; Functional Mobility Training  Patient Education: Pt verbalized understanding  REQUIRES PT FOLLOW UP: Yes     Patient Diagnosis(es): There were no encounter diagnoses. has a past medical history of Anesthesia complication, Diabetes mellitus ([address] and Hypertension. has a past surgical history that includes back surgery; UPPP; Foot surgery (Bilateral); ARTHRODESIS ANKLE (Right, 9/3/2020); and Achilles tendon surgery (Right, 9/3/2020).     Restrictions  Position Activity Restriction  Other position/activity restrictions: non weight bearing RLE, up with assistance  Subjective   General  Chart Reviewed: Yes  Additional Pertinent Hx: Pt is a 54 y.o. male adm 9/3 with OA R ankle. Pt s/p RIGHT PANTALAR  ARTHRODESIS, GASTROCNEMIUS RECESSION;TENDO-ACHILLES LENGTHENING, RELEASE OF MEDIAL ANKLE TENDONS (MULTIPLE) on 9/3. PMH: HTN, DM, bilat foot surgeries, back surgery  Subjective  Subjective: Pt found supine. Agreeable to PT. Appears frustrated with slow progress. Pain Screening  Patient Currently in Pain: (no c/o during session)  Vital Signs  Patient Currently in Pain: (no c/o during session)       Orientation     Cognition      Objective   Bed mobility  Supine to Sit: Supervision  Transfers  Sit to Stand: Minimal Assistance(from bed and chair; min to mod assist from w/c; CGA from commode with grab bars)  Stand to sit: Minimal Assistance(cues for safety and hand placement, assist to control descent)  Bed to Chair: Minimal assistance(stand pivot with walker bed>chair, chair<>w/c. CGA stand pivot without AD w/c <>commode using grab bars)  Ambulation  Ambulation?: Yes  Ambulation 1  Device: Rolling Walker  Assistance: Minimal assistance  Quality of Gait: able to maintain NWB RLE, decreased LLE step length, effortful  Distance: 3-4 steps x 3 trials  Wheelchair Activities  Propulsion: (Propelled w/c 12' x 2 independently)                                 G-Code     OutComes Score                                                     AM-PAC Score  AM-PAC Inpatient Mobility Raw Score : 14 (09/05/20 1245)  AM-PAC Inpatient T-Scale Score : 38.1 (09/05/20 1245)  Mobility Inpatient CMS 0-100% Score: 61.29 (09/05/20 1245)  Mobility Inpatient CMS G-Code Modifier : CL (09/05/20 1245)          Goals  Short term goals  Time Frame for Short term goals: By discharge  Short term goal 1: Sup to sit modified independent.   Ongoing  Short term goal 2: Pt will transfer sit to stand SBA NWB RLE with LRAD.  Ongoing  Short term goal 3: Pt will amb 5' with LRAD NWB RLE SBA. Ongoing  Short term goal 4: Pt will up/down curb step with LRAD NWB RLE CGA.   Ongoing    Plan    Plan  Times per week: 5-7  Current Treatment Recommendations: Functional Mobility Training, Balance Training, Safety Education & Training, Patient/Caregiver Education & Training, Gait Training, Stair training  Safety Devices  Type of devices: Call light within reach, Chair alarm in place, Nurse notified, Left in chair     Therapy Time   Individual Concurrent Group Co-treatment   Time In 0959         Time Out 1052         Minutes 53              Timed Code Treatment Minutes: 53      Total Treatment Minutes:  [address], PT

## 2020-09-05 NOTE — PROGRESS NOTES
Patient was unable to void on his own and is experiencing some discomfort. Straight cath'd for 1300 mL of yellow urine. Will continue to monitor and assess.

## 2020-09-05 NOTE — OP NOTE
[address]               [address] [facility], [address]                                OPERATIVE REPORT    PATIENT NAME: Savanna Alonso                         :        1970  MED REC NO:   [id number]                          ROOM:       5519  ACCOUNT NO:   [de-identified]                           ADMIT DATE: 2020  PROVIDER:     Candance Georgi. Merna Jimenez MD    DATE OF PROCEDURE:  2020    SURGEON:  Candance Georgi. Merna Jimenez MD    SECOND SURGEON:  Mike Fernandez PA-C    PREOPERATIVE DIAGNOSES:  Right,  1. Hindfoot arthritis. 2.  Equinus contracture. 3.  Midfoot cavus deformity. POSTOPERATIVE DIAGNOSES:  Right,  1. Hindfoot arthritis. 2.  Equinus contracture. 3.  Midfoot cavus deformity. OPERATIONS:  Right,  1. Pantalar arthrodesis. 2.  Gastrocnemius recession. 3.  Percutaneous tendo-Achilles lengthening. 4.  Lengthening multiple tendons at medial ankle (posterior tibial  tendon, flexor hallucis longus, flexor digitorum longus tendon). ANESTHETIC:  General with block. OPERATIVE INDICATIONS:  This is a 63-year-old gentleman with a history  of significant deformity. He has developed a rigid equinus contracture  and a rigid fixed varus deformity of his hindfoot with gross  incompetence of his lateral ankles. He has also developed marked  midfoot cavus deformity due to spasticity in absence of the peroneal  tendons. The patient has developed in addition to this, severe  arthritic pain of the peritalar joints and has elected for surgical  treatment. The risks and potential benefits of the procedures were  discussed with the patient. He understands these, was given the  opportunity to ask questions. His questions were answered to his  satisfaction. He has given consent to proceed with above-outlined  procedures. OPERATIVE PROCEDURE:  The patient was brought to the operating room,  placed in the supine position on the operating table. After induction  of general anesthetic, a pneumatic tourniquet was placed around the  patient's right proximal thigh and set to 350 mmHg. The right leg was  then prepped and draped free in the usual sterile fashion. A second surgeon was necessary due to the patient's increased size and  body mass index. Morbid obesity increased the overall technical  complexity of the procedure and a second surgeon was necessary to aid  with appropriate positioning of the patient and positioning of the  extremity during the procedure. In addition, the patient had severe  deformity and a second surgeon was necessary to aid with major deformity  correction. A second surgeon was necessary to aid with operation of the  multiplanar fluoroscopy unit. A second surgeon was necessary to  decrease overall operative time and to improve the patient's safety and  outcome. GASTROCNEMIUS RECESSION:  At this point, intraoperative Silfverskiold  test was performed. This revealed a significant equinus contracture. Incision was therefore made in line with the axis of extremity, centered  over the distal aponeurosis of the gastrocnemius muscle. Blunt  dissection was carried out through the subcutaneous and deeper tissue  taking care to identify and protect the sural nerve which was gently  retracted laterally. Deeper dissection revealed the overlying  aponeurosis of the gastrocnemius muscle which was divided transversely  under direct visualization. This allowed about 4 cm of lengthening  which nicely corrected the equinus contracture. This allowed the ankle  to be brought to neutral dorsiflexion; however, we could not bring this  beyond that and therefore, it was felt that a tendo-Achilles lengthening  would be necessary. This wound was irrigated with a sterile lavage  solution and closed with 3-0 nylon suture.     PERCUTANEOUS TENDO-ACHILLES LENGTHENING:  The patient continued to have  significant and marked rigid varus and equinus deformities present and  at this point, a three-step percutaneous tendo-Achilles lengthening as  described by Hiram Gunderson was performed. This was done through three small stab  wound incisions over the Achilles tendon while holding the ankle at  maximal dorsiflexion. The Achilles was released medially, distally,  laterally, centrally, then medially proximally spacing each small  incision about 2 cm apart. This allowed the patient's ankle to be  brought into 10 degrees of dorsiflexion with the knee in extension. These wounds were quite small and did not require closure. RELEASE OF MULTIPLE TENDONS AT THE ANKLE:  At this point, attention was  turned to addressing the patient's marked and rigid varus deformity. This appeared to be due to spasticity of the medial longitudinal  structures including the ankle tendons. An incision was therefore made  in line with the axis of the tarsal canal and dissection carried out  through the subcutaneous and deeper tissue. The posterior tibial tendon  was identified first.  This was causing majority of the forefoot  abduction and midfoot cavus deformity. A Z-lengthening of the posterior  tibial tendon allowed correction of some of this deformity. There was;  however, significant continued deformity present and the flexor  digitorum longus tendon was also Z-lengthened. Dissection was carried  out to identify and protect the tibial nerve and its medial and lateral  plantar branches. These were identified and retracted and protected and  a Z-lengthening of the flexor hallucis longus tendon was done deep to  these. This nicely corrected the midfoot deformity and the tendons at  this point were repaired in the lengthened manner with 3-0 Vicryl  suture. The wound was irrigated and closed in layers, 3-0 Vicryl suture  was used to approximate subcutaneous tissue and interrupted 3-0 nylon  was used to approximate the skin edges.     PANTALAR ARTHRODESIS:  At this point, the extremity was exsanguinated  and the pneumatic tourniquet inflated. An incision was made in line  with the axis of the extremity, centered over the ankle joint. Dissection was carried out through the subcutaneous and deeper tissue  taking care to identify and protect the superficial peroneal nerve which  was gently retracted. The extensor retinaculum was divided in line with  the axis of the extremity and the interval between the tibialis anterior  and extensor hallucis longus tendons was identified. Dissection was  carried out through taking care to identify and protect the anterior  tibial artery and deep peroneal nerve which were gently retracted  laterally. Exposure of the anterior ankle joint was performed and  anterior ankle capsulectomy was performed. End-stage osteoarthritis was  appreciated of the ankle with essentially no remaining normal articular  cartilage either on the tibial or talar surfaces. A release of the  deltoid ligament was performed at this point with a #15 scalpel which  allowed the ankle to be corrected to a neutral axial position in the  varus valgus plane. The joint was prepared for arthrodesis with an AO  chisel. This was used to remove articular cartilage and subchondral  bone from the distal tibia and distal tibial articular surface and  dorsal talar articular surface. The ankle itself was reduced at this  point and pinned in place with a 0.62 K-wire. Attention at this point  was turned to the subtalar joint. An incision was made from the tip of  the fibula towards the base of the fourth metatarsal and dissection  carried out through the subcutaneous and deeper tissue. Care was taken  to identify and protect the sural nerve at this level as well as the  superficial peroneal nerve more superiorly. Dissection was carried out  into the tarsal sinus and the subtalar joint was exposed. Significant  arthritis of the subtalar joint was appreciated.   This was prepared for  arthrodesis with an AO chisel. The AO chisel was used to remove the  articular cartilage from the posterior middle and anterior facets of the  subtalar joint. These were then feathered into subchondral bone to  promote a healthy arthrodesis surface. Inspection of the tibiofibular  joint at this point also revealed marked arthritis and at this point,  attention was turned to prepare the tibiofibular joint for arthrodesis  and the lateral fibular talar joint for arthrodesis. It was felt that a  fibular onlay-type fusion would improve our chances of successful  arthrodesis here. For this reason, the incision was carried out more  proximally along the lateral aspect of the fibula, taking care to  protect the superficial peroneal nerve anteriorly. A subperiosteal  dissection was performed proximal to the level of the ankle and the  fibula was transected. The medial cortical surface of the fibula was  excised with a high-speed handheld oscillating saw exposing cancellous  bone. The incisor of fibularis at the tibia and lateral tibial cortex  was also denuded of back to cancellous bone with a saw and the lateral  talar facet was resected with a saw to match this. At this point,  attention was turned to fixation of the arthrodesis. A guidewire for  the Integra hindfoot arthrodesis nail was applied from the inferior  aspect of the calcaneus through the subtalar joint across the tibiotalar  joint and into the distal tibia. This was checked with multiplanar  fluoroscopy. Rotation was set to match the anterior tibia to the second  ray and varus and valgus was set to neutral, dorsiflexion and  plantarflexion set to neutral.  Fine-tuning was done at the arthrodesis  with a saw to ensure all good bony match of all the fusion surfaces.    Once adequate positioning was achieved, the incision was made plantarly  and blunt dissection carried out to the calcaneus and the hindfoot  arthrodesis site was reamed in 0.5 mm increments from 8 mm to 12 mm. The hindfoot reamer was then expanded to 13 mm to accommodate the nail. A 180 x 11 mm hindfoot arthrodesis nail from Integra was then impacted  into place. This was locked from posterior to anterior in the calcaneus  under fluoroscopic control through two small stab wound incisions. The  compression guide was then applied and two compression beams applied  across the tibia as per 's recommendations. Rigid  compression was achieved with the external compression jig and the nail  was locked with a medial to lateral screw applied through the  compression guide. This afforded rigid and excellent compression and  stability of the entire hindfoot arthrodesis site. The lateral  arthrodesis bed was then compressed with a combination of a Omni fusion  plate and four 4.5 mm cannulated compression screws from [employer](TM) by [employer].  These afforded rigid and excellent compression and stability  of the lateral fibular talar joint and across the tibiofibular  syndesmosis. The wounds were irrigated with a sterile lavage solution. Of note, we never achieved good tourniquet hemostasis throughout this. Therefore after a period of time, the tourniquet was released and  meticulous hemostasis maintained throughout the remainder of the  procedure. Prior to bone grafting, the entire area was completely  irrigated again and more hemostasis obtained. Bone grafting was done at  this point with a combination of BioFuse demineralized bone matrix and a  morselized Infuse sponge which had been prepared per 's  recommendations. These were passed throughout the various joints  including the tibiotalar, tibiofibular, and subtalar joints. The wounds  at this point were closed in layers. The extensor retinaculum  reapproximated with 2-0 PDS suture laterally. The deeper tissues  reapproximated with 2-0 PDS suture.   The subcutaneous tissues  reapproximated with 3-0 Vicryl suture and the skin edges reapproximated  with interrupted 3-0 nylon. There were no drains and no complications. The sponge and needle counts  were noted be correct at end of the procedure by the nursing staff. Following closure and application of dressings, the patient was placed  in a well-padded posterior splint with Courtney Karri and awoken  from his anesthetic and transferred to the postanesthesia care unit. Estimated blood loss 350 mL.         Tiff Quispe MD    D: 09/04/2020 17:42:15       T: 09/04/2020 17:52:01     [email or web address]  Job#: [id number]     Doc#: [id number]    CC:

## 2020-09-05 NOTE — PROGRESS NOTES
Hospitalist Progress Note      PCP: Javed Frances    Date of Admission: 9/3/2020    Chief Complaint: Left leg pain    Hospital Course: This is a morbidly obese 29-year-old male admitted on 9/3/2020 for right ankle surgery past medical history of hypertension, diabetes mellitus hospitalist consulted for medical management. Subjective: Patient is working with physical therapy complaints of right leg pain and constipation,      Medications:  Reviewed    Infusion Medications   Scheduled Medications    rivaroxaban  10 mg Oral Daily    tamsulosin  0.4 mg Oral Daily    ropivacaine  30 mL Infiltration Once    lisinopril  10 mg Oral Daily    DULoxetine  30 mg Oral Daily    gabapentin  100 mg Oral TID    metFORMIN  500 mg Oral Daily with breakfast    triamterene-hydroCHLOROthiazide  1 tablet Oral Daily    sodium chloride flush  10 mL Intravenous 2 times per day     PRN Meds: methocarbamol, sodium chloride flush, promethazine **OR** ondansetron, HYDROcodone 5 mg - acetaminophen **OR** HYDROcodone 5 mg - acetaminophen      Intake/Output Summary (Last 24 hours) at 9/5/2020 1305  Last data filed at 9/5/2020 0953  Gross per 24 hour   Intake 740 ml   Output 1975 ml   Net -1235 ml       Physical Exam Performed:    /69   Pulse 89   Temp 97.9 °F (36.6 °C) (Oral)   Resp 18   Ht 6' 1\" (1.854 m)   Wt (!) 430 lb 9.6 oz (195.3 kg)   SpO2 96%   BMI 56.81 kg/m²     General appearance: No apparent distress, appears stated age and cooperative. HEENT: Pupils equal, round, and reactive to light. Conjunctivae/corneas clear. Neck: Supple, with full range of motion. No jugular venous distention. Trachea midline. Respiratory:  Normal respiratory effort. Clear to auscultation, bilaterally without Rales/Wheezes/Rhonchi. Cardiovascular: Regular rate and rhythm with normal S1/S2 without murmurs, rubs or gallops. Abdomen: Soft, non-tender, non-distended with normal bowel sounds.   Musculoskeletal: No clubbing, cyanosis or edema bilaterally. Full range of motion without deformity. Right leg in dressing. Skin: Skin color, texture, turgor normal.  No rashes or lesions. Neurologic:  Neurovascularly intact without any focal sensory/motor deficits. Cranial nerves: II-XII intact, grossly non-focal.  Psychiatric: Alert and oriented, thought content appropriate, normal insight  Capillary Refill: Brisk,< 3 seconds   Peripheral Pulses: +2 palpable, equal bilaterally       Labs:   No results for input(s): WBC, HGB, HCT, PLT in the last 72 hours. No results for input(s): NA, K, CL, CO2, BUN, CREATININE, CALCIUM, PHOS in the last 72 hours. Invalid input(s): MAGNES  No results for input(s): AST, ALT, BILIDIR, BILITOT, ALKPHOS in the last 72 hours. No results for input(s): INR in the last 72 hours. No results for input(s): Laverda Dancer in the last 72 hours. Urinalysis:    No results found for: Darron Peels, BACTERIA, RBCUA, BLOODU, SPECGRAV, [place] [address]    Radiology:  XR ANKLE RIGHT (2 VIEWS)   Final Result   1. Postsurgical changes related to tibiotalar arthrodesis. FLUORO FOR SURGICAL PROCEDURES   Final Result   1. Postsurgical changes related to tibiotalar arthrodesis. Assessment/Plan:    Active Hospital Problems    Diagnosis    Arthritis of right ankle [M19.071]   Hypertension  Hyperlipidemia    1. Postop day #2 status post RIGHT PANTALAR  ARTHRODESIS, GASTROCNEMIUS RECESSION; TENDO-ACHILLES LENGTHENING, RELEASE OF MEDIAL ANKLE TENDONS (MULTIPLE) postop care per orthopedic. 2.  Hypertension controlled continue with home medication. 3.  Diabetes mellitus type 2 continue with the Metformin and sliding scale, check hemoglobin A1c.  4.  Severe morbid obesity BMI 56.81  5. Unable to urinate Edward in place continue with Flomax voiding trial as outpatient.     DVT Prophylaxis: Per orthopedic  Diet: DIET GENERAL;  Code Status: Full Code    PT/OT Eval Status:     Richard Marshall MD

## 2020-09-05 NOTE — PROGRESS NOTES
Patient is alert and oriented x 4. Calls out appropriately. RLE is wrapped in a dry dressing, splint and ACE wrap. Reinforced last night with ABD pads and another ACE wrap. No new strike-through noted this shift. Have not needed to reinforce. Pain medication has been given around the clock. \"See the MAR\" Morphine given to patient this evening - patient notes morphine makes him nauseous. Zofran given PRN with relief. Cold wash cloth placed on the forehead to act as an extra measure. Patient is tolerating PO fluids well. Patient voiding per urinal. 400 mL of yellow, urine recorded. Will bladder scan patient at midnight to check for retention. Vital signs are stable. Will continue to assess and monitor.

## 2020-09-05 NOTE — PROGRESS NOTES
Patient was bladder scanned around 1 am with a residual of 495 located in the bladder. Patient wanted to wait another hour or so to try and urinate again on his own. Will continue to monitor and assess.

## 2020-09-05 NOTE — DISCHARGE INSTR - COC
documented pain score (0-10 scale): Pain Level: 7  Last Weight:   Wt Readings from Last 1 Encounters:   09/03/20 (!) 430 lb 9.6 oz (195.3 kg)     Mental Status:  oriented and alert    IV Access:  - None    Nursing Mobility/ADLs:  Walking   Assisted  Transfer  Assisted  Bathing  Assisted  Dressing  Assisted  Toileting  Assisted  Feeding  [address]  Independent  Med Delivery   whole    Wound Care Documentation and Therapy:        Elimination:  Continence:   · Bowel: Yes  · Bladder: Yes  Urinary Catheter: None   Colostomy/Ileostomy/Ileal Conduit: No       Date of Last BM: 9-9    Intake/Output Summary (Last 24 hours) at 9/5/2020 1146  Last data filed at 9/5/2020 0953  Gross per 24 hour   Intake 740 ml   Output 2825 ml   Net -2085 ml     I/O last 3 completed shifts: In: 740 [P.O.:700; I.V.:40]  Out: 2550 [Urine:2550]    Safety Concerns: At Risk for Falls    Impairments/Disabilities:      None    Nutrition Therapy:  Current Nutrition Therapy:   - Oral Diet:  General    Routes of Feeding: Oral  Liquids: Thin Liquids  Daily Fluid Restriction: no  Last Modified Barium Swallow with Video (Video Swallowing Test): not done    Treatments at the Time of Hospital Discharge:   Respiratory Treatments: RA  Oxygen Therapy:  is not on home oxygen therapy.   Ventilator:    - No ventilator support    Rehab Therapies: Physical Therapy and Occupational Therapy  Weight Bearing Status/Restrictions: Non-weight bearing on right leg  Other Medical Equipment (for information only, NOT a DME order):  wheelchair, crutches and walker  Other Treatments:     Patient's personal belongings (please select all that are sent with patient):  None    RN SIGNATURE:  Electronically signed by Carlin Simmons RN on 9/9/20 at 2:19 PM EDT    CASE MANAGEMENT/SOCIAL WORK SECTION    Inpatient Status Date: 9/3/20    Readmission Risk Assessment Score:  Readmission Risk              Risk of Unplanned Readmission:        7           Discharging to Facility/ Agency   · [facility]  [facility]  · Phone:  · Fax:    Dialysis Facility (if applicable)   · Name:  · Address:  · Dialysis Schedule:  · Phone:  · Fax:    / signature: Electronically signed by Afshin Bangura RN on 9/9/20 at 1:13 PM EDT    PHYSICIAN SECTION    Prognosis: Good    Condition at Discharge: Stable    Rehab Potential (if transferring to Rehab): Good    Recommended Labs or Other Treatments After Discharge: follow with wound care    Physician Certification: I certify the above information and transfer of Jess Oden  is necessary for the continuing treatment of the diagnosis listed and that he requires LTAC for greater 30 days.      Update Admission H&P: No change in H&P    PHYSICIAN SIGNATURE:  Electronically signed by Rony Giordano MD on 9/5/20 at 11:48 AM EDT

## 2020-09-05 NOTE — CARE COORDINATION
Case Management Daily Note                    Date: 9/5/2020     Patient Name: Nitin Denson    Date of Admission: 9/3/2020 10:49 AM  YOB: 1970    Length of Stay: 2         Patient Admission Status: Inpatient  Diagnosis:Primary osteoarthritis of right ankle [M19.071]  Other acquired deformities of right foot [M21.6X1]  Short Achilles tendon, right [M67.01]  Arthritis of right ankle [M19.071]     ________________________________________________________________________________________  Discharge Plan: Now wants [facility]: [facility] in [place]. (Would require acceptance and pre-cert) Is already set up for [address] [place] [address] in [place] ([phone]). Stefan walker provided by [employer] ([phone])  Placed referral in Epic: sent ADT packet    Insurance: Payor: Kiana Anderson / Plan: Kiana Anderson / Product Type: *No Product type* /   Is pre-cert/notification needed: Yes    Tentative discharge date: 9/8    Current barriers: Referral pending, pre-cert needed (could not start till Tuesday)     Referrals completed: C: [facility]. Re[facility]: [facility]     Resources/ information provided: SNF List   ________________________________________________________________________________________  PT AM-PAC: 15 / 24 per last evaluation on: 9/4    OT AM-PAC: 18 / 24 per last evaluation on: 9/4    DME Needs for discharge: Edward [facility] (Provided)   ________________________________________________________________________________________  Notes/Plan of Care:   ALIREZA spoke with RN BRODIE Biggs. Patient now wanting to see if he could qualify for for [facility] in [place] based on his scores. ALIREZA noted initial plan was home with [facility] and stefan walker. ALIREZA called local [employer] line and they will obtain [facility] information. Patient would requires a pre-cert which could not be initiated until Tuesday. SW will have to review SNF options for patient [employer]. UPDATE: 1:21 pm.  ALIREZA received a call from patient's wife Chad Councilman ([phone]). She works for [employer] and that is why she wants patient to go there. SW updated her that referral would be placed. ALIREZA also answered questions regarding transport. ALIREZA dicussed if he qualifies he can do medical transport vs. Personal vehicle. Was also concerned about having cast on before discharge. Patient may continue to improve over the next three days and still may be able to go home. Carmita Velasquez and/or his family were provided with choice of provider; he and/or his family are in agreement with the discharge plan at this time.     Care Transition Patient: YISEL Bee  [facility]. - Weekend   Case Management Department  Ph: [phone]

## 2020-09-05 NOTE — PROGRESS NOTES
Bladder scan shows 286ml at 0830. Patient is uncomfortable and feels the urge to urinate but can't. Bladder scan last night did not reflect what was in bladder, may be difficult to get an accurate reading d/t anatomy. Call placed to Dr Johnathan Hill office to see if he needs silva or straight cath at this time. Waiting for call back.

## 2020-09-05 NOTE — PROGRESS NOTES
Occupational Therapy  Facility/Department: [facility] [address] 5T ORTHO/NEURO  Daily Treatment Note  NAME: Keyanna Fish  : 1970  MRN: [id number]    Date of Service: 2020    Discharge Recommendations:    Keyanna Fish scored a 18/24 on the AM-PAC ADL Inpatient form. Current research shows that an AM-PAC score of 17 or less is typically not associated with a discharge to the patient's home setting. Based on the patient's AM-PAC score and their current ADL deficits, it is recommended that the patient have 3-5 sessions per week of Occupational Therapy at d/c to increase the patient's independence. Please see assessment section for further patient specific details. If patient discharges prior to next session this note will serve as a discharge summary. Please see below for the latest assessment towards goals. OT Equipment Recommendations  Equipment Needed: No    Assessment   Performance deficits / Impairments: Decreased functional mobility ; Decreased endurance;Decreased strength;Decreased ADL status; Decreased balance  Assessment: Pt appears to be feeling a little better today, but also seems depressed about his situation. Pt with a little improvement with mobility, but it is still difficult for him to hop using the walker. Pt is now considering goiong somewhere for rehab. Cont OT tx per plan of care. Treatment Diagnosis: Decreased functional mobility and ADLs  Prognosis: Good  OT Education: OT Role;Plan of Care;Transfer Training  Patient Education: Pt verbalized understanding and in agreement  REQUIRES OT FOLLOW UP: Yes  Activity Tolerance  Activity Tolerance: Patient Tolerated treatment well  Safety Devices  Safety Devices in place: Yes  Type of devices: Left in chair;Call light within reach; Chair alarm in place;Nurse notified         Restrictions  Position Activity Restriction  Other position/activity restrictions: non weight bearing RLE, up with assistance  Subjective   General  Chart Reviewed: Yes  Patient assessed for rehabilitation services?: Yes  Additional Pertinent Hx: Pt is a 55 yo M admitted on 9/3 for a RIGHT PANTALAR  ARTHRODESIS, GASTROCNEMIUS RECESSION PMhx: DM, HTN, back surgery. Family / Caregiver Present: No  Referring Practitioner: Nemesio Mejia MD  Diagnosis: Primary Osteoarthritis of Right ankle  Subjective  Subjective: Pt in bed upon entry. I felt so optimistic this morning. Objective    ADL  LE Dressing: Supervision(to don Lft shoe sitting edge of bed)  Toileting: Supervision(toileting hygine in sitting with use of toileting aid.   No clothing management performed.)        Balance  Sitting Balance: Supervision  Standing Balance: Contact guard assistance  Standing Balance  Activity: transfers  Functional Mobility  Functional - Mobility Device: Wheelchair  Activity: To/from bathroom  Assist Level: Modified independent   Toilet Transfers  Toilet - Technique: Stand pivot(from wc)  Equipment Used: Standard toilet(grab bars)  Toilet Transfer: Contact guard assistance(heavy use of grab bars)  Bed mobility  Supine to Sit: Supervision  Transfers  Sit to stand: 2 Person assistance(min assist x2 from bed and chair, min-mod assist from wc, CG from toilet with heavy use of grab bars)  Stand to sit: Moderate assistance                                                                 Plan   Plan  Times per week: 5-7  Current Treatment Recommendations: Strengthening, Balance Training, Functional Mobility Training, Endurance Training, Self-Care / ADL    AM-PAC Score        AM-[facility] Inpatient Daily Activity Raw Score: 18 (09/05/20 1225)  AM-PAC Inpatient ADL T-Scale Score : 38.66 (09/05/20 1225)  ADL Inpatient CMS 0-100% Score: 46.65 (09/05/20 1225)  ADL Inpatient CMS G-Code Modifier : CK (09/05/20 1225)    Goals                           No goals met  Short term goals  Time Frame for Short term goals: discharge  Short term goal 1: Transfer to/from [facility] with CGA  Short term goal 2: Stance with CGA for 3 mins while maintaining precautions and engaging in functional mobility/ADL  Short term goal 3: Complete UE HEP with min cues to increase UE strength for functional mobility/ADL  Patient Goals   Patient goals : return home       Therapy Time   Individual Concurrent Group Co-treatment   Time In 0959         Time Out 1052         Minutes 53         Timed Code Treatment Minutes: [address], OTR/L [address]

## 2020-09-06 LAB
ESTIMATED AVERAGE GLUCOSE: 148.5 MG/DL
GLUCOSE BLD-MCNC: 111 MG/DL (ref 70–99)
GLUCOSE BLD-MCNC: 157 MG/DL (ref 70–99)
GLUCOSE BLD-MCNC: 93 MG/DL (ref 70–99)
HBA1C MFR BLD: 6.8 %
PERFORMED ON: ABNORMAL
PERFORMED ON: ABNORMAL
PERFORMED ON: NORMAL

## 2020-09-06 PROCEDURE — 6370000000 HC RX 637 (ALT 250 FOR IP): Performed by: ORTHOPAEDIC SURGERY

## 2020-09-06 PROCEDURE — 6370000000 HC RX 637 (ALT 250 FOR IP): Performed by: INTERNAL MEDICINE

## 2020-09-06 PROCEDURE — 1200000000 HC SEMI PRIVATE

## 2020-09-06 PROCEDURE — 6370000000 HC RX 637 (ALT 250 FOR IP): Performed by: HOSPITALIST

## 2020-09-06 PROCEDURE — 2580000003 HC RX 258: Performed by: ORTHOPAEDIC SURGERY

## 2020-09-06 PROCEDURE — 6370000000 HC RX 637 (ALT 250 FOR IP): Performed by: PHYSICIAN ASSISTANT

## 2020-09-06 PROCEDURE — 51702 INSERT TEMP BLADDER CATH: CPT

## 2020-09-06 RX ORDER — CALCIUM CARBONATE 200(500)MG
500 TABLET,CHEWABLE ORAL 3 TIMES DAILY PRN
Status: DISCONTINUED | OUTPATIENT
Start: 2020-09-06 | End: 2020-09-09 | Stop reason: HOSPADM

## 2020-09-06 RX ADMIN — METHOCARBAMOL 500 MG: 500 TABLET ORAL at 03:20

## 2020-09-06 RX ADMIN — Medication 10 ML: at 08:47

## 2020-09-06 RX ADMIN — DULOXETINE HYDROCHLORIDE 30 MG: 30 CAPSULE, DELAYED RELEASE ORAL at 08:47

## 2020-09-06 RX ADMIN — Medication 10 ML: at 20:03

## 2020-09-06 RX ADMIN — HYDROCODONE BITARTRATE AND ACETAMINOPHEN 2 TABLET: 5; 325 TABLET ORAL at 11:45

## 2020-09-06 RX ADMIN — METFORMIN HYDROCHLORIDE 500 MG: 500 TABLET, EXTENDED RELEASE ORAL at 08:47

## 2020-09-06 RX ADMIN — GABAPENTIN 100 MG: 100 CAPSULE ORAL at 09:56

## 2020-09-06 RX ADMIN — HYDROCODONE BITARTRATE AND ACETAMINOPHEN 2 TABLET: 5; 325 TABLET ORAL at 00:59

## 2020-09-06 RX ADMIN — HYDROCODONE BITARTRATE AND ACETAMINOPHEN 2 TABLET: 5; 325 TABLET ORAL at 22:25

## 2020-09-06 RX ADMIN — METHOCARBAMOL 500 MG: 500 TABLET ORAL at 18:27

## 2020-09-06 RX ADMIN — LISINOPRIL 10 MG: 10 TABLET ORAL at 08:46

## 2020-09-06 RX ADMIN — METHOCARBAMOL 500 MG: 500 TABLET ORAL at 08:47

## 2020-09-06 RX ADMIN — ANTACID TABLETS 500 MG: 500 TABLET, CHEWABLE ORAL at 20:03

## 2020-09-06 RX ADMIN — HYDROCODONE BITARTRATE AND ACETAMINOPHEN 2 TABLET: 5; 325 TABLET ORAL at 06:03

## 2020-09-06 RX ADMIN — SENNOSIDES 8.6 MG: 8.6 TABLET, FILM COATED ORAL at 20:03

## 2020-09-06 RX ADMIN — TRIAMTERENE AND HYDROCHLOROTHIAZIDE 1 TABLET: 37.5; 25 TABLET ORAL at 08:47

## 2020-09-06 RX ADMIN — SENNOSIDES 8.6 MG: 8.6 TABLET, FILM COATED ORAL at 08:46

## 2020-09-06 RX ADMIN — GABAPENTIN 100 MG: 100 CAPSULE ORAL at 20:03

## 2020-09-06 RX ADMIN — TAMSULOSIN HYDROCHLORIDE 0.4 MG: 0.4 CAPSULE ORAL at 08:46

## 2020-09-06 RX ADMIN — RIVAROXABAN 10 MG: 10 TABLET, FILM COATED ORAL at 18:27

## 2020-09-06 RX ADMIN — BISACODYL 10 MG: 5 TABLET, COATED ORAL at 13:55

## 2020-09-06 RX ADMIN — GABAPENTIN 100 MG: 100 CAPSULE ORAL at 13:56

## 2020-09-06 ASSESSMENT — PAIN SCALES - GENERAL
PAINLEVEL_OUTOF10: 7
PAINLEVEL_OUTOF10: 6
PAINLEVEL_OUTOF10: 7
PAINLEVEL_OUTOF10: 7
PAINLEVEL_OUTOF10: 9
PAINLEVEL_OUTOF10: 3
PAINLEVEL_OUTOF10: 3
PAINLEVEL_OUTOF10: 0
PAINLEVEL_OUTOF10: 4

## 2020-09-06 ASSESSMENT — PAIN DESCRIPTION - PAIN TYPE
TYPE: SURGICAL PAIN

## 2020-09-06 ASSESSMENT — PAIN DESCRIPTION - ORIENTATION
ORIENTATION: RIGHT

## 2020-09-06 ASSESSMENT — PAIN DESCRIPTION - ONSET
ONSET: ON-GOING

## 2020-09-06 ASSESSMENT — PAIN DESCRIPTION - PROGRESSION
CLINICAL_PROGRESSION: NOT CHANGED
CLINICAL_PROGRESSION: GRADUALLY WORSENING

## 2020-09-06 ASSESSMENT — PAIN DESCRIPTION - FREQUENCY
FREQUENCY: CONTINUOUS

## 2020-09-06 ASSESSMENT — PAIN DESCRIPTION - LOCATION
LOCATION: FOOT

## 2020-09-06 ASSESSMENT — PAIN DESCRIPTION - DESCRIPTORS
DESCRIPTORS: ACHING
DESCRIPTORS: ACHING;DISCOMFORT

## 2020-09-06 ASSESSMENT — PAIN - FUNCTIONAL ASSESSMENT
PAIN_FUNCTIONAL_ASSESSMENT: ACTIVITIES ARE NOT PREVENTED
PAIN_FUNCTIONAL_ASSESSMENT: ACTIVITIES ARE NOT PREVENTED
PAIN_FUNCTIONAL_ASSESSMENT: PREVENTS OR INTERFERES SOME ACTIVE ACTIVITIES AND ADLS
PAIN_FUNCTIONAL_ASSESSMENT: PREVENTS OR INTERFERES SOME ACTIVE ACTIVITIES AND ADLS

## 2020-09-06 NOTE — PLAN OF CARE
Problem: Falls - Risk of:  Goal: Will remain free from falls  Description: Will remain free from falls  Outcome: Ongoing   Patient at risk for falls. Patient resting quietly in bed. Side rails up x 2. Bed locked in lowest position. Bed alarm on. Bedside table and call light within reach. Patient instructed to call for assistance. Patient verbalized understanding. Will continue to monitor. Problem: Skin Integrity:  Goal: Will show no infection signs and symptoms  Description: Will show no infection signs and symptoms  Outcome: Ongoing   No new skin issues. Problem: Pain:  Goal: Pain level will decrease  Description: Pain level will decrease  Outcome: Ongoing   Rating pain relief with PO Norco  and ice, rating it 3/10. Problem: Infection - Surgical Site:  Goal: Will show no infection signs and symptoms  Description: Will show no infection signs and symptoms  Outcome: Ongoing  No new s/s of infection at this time.

## 2020-09-06 NOTE — PLAN OF CARE
Problem: Falls - Risk of:  Goal: Will remain free from falls  Description: Will remain free from falls  9/6/2020 0033 by Connor Gordon RN  Outcome: Ongoing  Note: Hourly rounding on patient for needs. Non-skid socks on, bed in lowest position and locked. Bedside table, personal belongs, and nurse call light within reach. Instructed patient to use call light for assistance. Bed alarm on, Floor clear of clutter. Patient remains free of falls at this time. Will continue to monitor. Problem: Skin Integrity:  Goal: Will show no infection signs and symptoms  Description: Will show no infection signs and symptoms  9/6/2020 0033 by Connor Gordon RN  Outcome: Ongoing  Note: Unable to assess surgical site due to dressing, dressing is clean/dry/intact. No odor or drainage noted. Patient remains afebrile at this time. Will continue to monitor. Problem: Pain:  Goal: Pain level will decrease  Description: Pain level will decrease  9/6/2020 0033 by Connor Gordon RN  Outcome: Ongoing  Note: Patient has c/o pain rated 7/10. Jing Messing PRN pain medication administered ,upon reassessment patient verbalized satisfaction. Will continue to monitor. Problem: Infection - Surgical Site:  Goal: Will show no infection signs and symptoms  Description: Will show no infection signs and symptoms  9/6/2020 0033 by Connor Gordon RN  Outcome: Ongoing  Note: Unable to assess surgical site due to dressing, dressing is clean/dry/intact. No odor or drainage noted. Patient remains afebrile at this time. Will continue to monitor. Problem: Urinary Elimination:  Goal: Signs and symptoms of infection will decrease  Description: Signs and symptoms of infection will decrease  Outcome: Ongoing  Note: Patient at risk for UTI d/t silva catheter in place. Silva care provided w/green silva wipes, and educated patient on importance of frequent silva care and risk for UTI. Patient verbalized understanding, will continue to monitor.

## 2020-09-06 NOTE — PROGRESS NOTES
Hospitalist Progress Note      PCP: Yesica Aguayo    Date of Admission: 9/3/2020    Chief Complaint: Left leg pain    Hospital Course: This is a morbidly obese 68-year-old male admitted on 9/3/2020 for right ankle surgery past medical history of hypertension, diabetes mellitus hospitalist consulted for medical management. Subjective: Patient is sitting in a chair continues to complain of right leg pain,      Medications:  Reviewed    Infusion Medications   Scheduled Medications    senna  1 tablet Oral BID    rivaroxaban  10 mg Oral Daily    tamsulosin  0.4 mg Oral Daily    ropivacaine  30 mL Infiltration Once    lisinopril  10 mg Oral Daily    DULoxetine  30 mg Oral Daily    gabapentin  100 mg Oral TID    metFORMIN  500 mg Oral Daily with breakfast    triamterene-hydroCHLOROthiazide  1 tablet Oral Daily    sodium chloride flush  10 mL Intravenous 2 times per day     PRN Meds: methocarbamol, sodium chloride flush, promethazine **OR** ondansetron, HYDROcodone 5 mg - acetaminophen **OR** HYDROcodone 5 mg - acetaminophen      Intake/Output Summary (Last 24 hours) at 9/6/2020 0939  Last data filed at 9/6/2020 0850  Gross per 24 hour   Intake 970 ml   Output 3275 ml   Net -2305 ml       Physical Exam Performed:    /72   Pulse 92   Temp 98.3 °F (36.8 °C) (Oral)   Resp 18   Ht 6' 1\" (1.854 m)   Wt (!) 430 lb 9.6 oz (195.3 kg)   SpO2 95%   BMI 56.81 kg/m²     General appearance: No apparent distress, appears stated age and cooperative. HEENT: Pupils equal, round, and reactive to light. Conjunctivae/corneas clear. Neck: Supple, with full range of motion. No jugular venous distention. Trachea midline. Respiratory:  Normal respiratory effort. Clear to auscultation, bilaterally without Rales/Wheezes/Rhonchi. Cardiovascular: Regular rate and rhythm with normal S1/S2 without murmurs, rubs or gallops. Abdomen: Soft, non-tender, non-distended with normal bowel sounds.   Musculoskeletal: No clubbing, cyanosis or edema bilaterally. Full range of motion without deformity. Right leg in dressing  Skin: Skin color, texture, turgor normal.  No rashes or lesions. Neurologic:  Neurovascularly intact without any focal sensory/motor deficits. Cranial nerves: II-XII intact, grossly non-focal.  Psychiatric: Alert and oriented, thought content appropriate, normal insight  Capillary Refill: Brisk,< 3 seconds   Peripheral Pulses: +2 palpable, equal bilaterally       Labs:   No results for input(s): WBC, HGB, HCT, PLT in the last 72 hours. No results for input(s): NA, K, CL, CO2, BUN, CREATININE, CALCIUM, PHOS in the last 72 hours. Invalid input(s): MAGNES  No results for input(s): AST, ALT, BILIDIR, BILITOT, ALKPHOS in the last 72 hours. No results for input(s): INR in the last 72 hours. No results for input(s): Lorrene Rom in the last 72 hours. Urinalysis:    No results found for: Jolena Pancake, BACTERIA, RBCUA, BLOODU, SPECGRAV, [place] [address]    Radiology:  XR ANKLE RIGHT (2 VIEWS)   Final Result   1. Postsurgical changes related to tibiotalar arthrodesis. FLUORO FOR SURGICAL PROCEDURES   Final Result   1. Postsurgical changes related to tibiotalar arthrodesis. Assessment/Plan:    Active Hospital Problems    Diagnosis    Arthritis of right ankle [M19.071]   Hypertension  Hyperlipidemia    1. Postop day #3 status post RIGHT PANTALAR  ARTHRODESIS, GASTROCNEMIUS RECESSION; TENDO-ACHILLES LENGTHENING, RELEASE OF MEDIAL ANKLE TENDONS (MULTIPLE) postop care per orthopedic. 2.  Hypertension controlled continue with home medication. 3.  Diabetes mellitus type 2 continue with the Metformin and sliding scale,  hemoglobin A1c= 6.8 on 9/5/2020  4. Severe morbid obesity BMI 56.81  5. Urinary retention Edward in place continue with Flomax voiding trial as outpatient.   6.  Constipation continue with the Senokot 1 p.o. twice daily Dulcolax 10 mg p.o. x1    DVT Prophylaxis: Per orthopedic  Diet: DIET GENERAL;  Code Status: Full Code    PT/OT Eval Status:     Krys Grijalva MD

## 2020-09-07 LAB
GLUCOSE BLD-MCNC: 110 MG/DL (ref 70–99)
GLUCOSE BLD-MCNC: 114 MG/DL (ref 70–99)
GLUCOSE BLD-MCNC: 114 MG/DL (ref 70–99)
GLUCOSE BLD-MCNC: 139 MG/DL (ref 70–99)
PERFORMED ON: ABNORMAL

## 2020-09-07 PROCEDURE — 6370000000 HC RX 637 (ALT 250 FOR IP): Performed by: INTERNAL MEDICINE

## 2020-09-07 PROCEDURE — 6370000000 HC RX 637 (ALT 250 FOR IP): Performed by: HOSPITALIST

## 2020-09-07 PROCEDURE — 1200000000 HC SEMI PRIVATE

## 2020-09-07 PROCEDURE — 2580000003 HC RX 258: Performed by: ORTHOPAEDIC SURGERY

## 2020-09-07 PROCEDURE — 6370000000 HC RX 637 (ALT 250 FOR IP): Performed by: PHYSICIAN ASSISTANT

## 2020-09-07 PROCEDURE — 6370000000 HC RX 637 (ALT 250 FOR IP): Performed by: ORTHOPAEDIC SURGERY

## 2020-09-07 RX ORDER — BISACODYL 10 MG
10 SUPPOSITORY, RECTAL RECTAL DAILY PRN
Status: DISCONTINUED | OUTPATIENT
Start: 2020-09-07 | End: 2020-09-09 | Stop reason: HOSPADM

## 2020-09-07 RX ADMIN — GABAPENTIN 100 MG: 100 CAPSULE ORAL at 09:39

## 2020-09-07 RX ADMIN — METFORMIN HYDROCHLORIDE 500 MG: 500 TABLET, EXTENDED RELEASE ORAL at 09:39

## 2020-09-07 RX ADMIN — TAMSULOSIN HYDROCHLORIDE 0.4 MG: 0.4 CAPSULE ORAL at 09:39

## 2020-09-07 RX ADMIN — METHOCARBAMOL 500 MG: 500 TABLET ORAL at 17:04

## 2020-09-07 RX ADMIN — ANTACID TABLETS 500 MG: 500 TABLET, CHEWABLE ORAL at 11:16

## 2020-09-07 RX ADMIN — LISINOPRIL 10 MG: 10 TABLET ORAL at 09:39

## 2020-09-07 RX ADMIN — Medication 10 ML: at 09:41

## 2020-09-07 RX ADMIN — ANTACID TABLETS 500 MG: 500 TABLET, CHEWABLE ORAL at 21:09

## 2020-09-07 RX ADMIN — DULOXETINE HYDROCHLORIDE 30 MG: 30 CAPSULE, DELAYED RELEASE ORAL at 09:39

## 2020-09-07 RX ADMIN — GABAPENTIN 100 MG: 100 CAPSULE ORAL at 14:13

## 2020-09-07 RX ADMIN — METHOCARBAMOL 500 MG: 500 TABLET ORAL at 11:16

## 2020-09-07 RX ADMIN — TRIAMTERENE AND HYDROCHLOROTHIAZIDE 1 TABLET: 37.5; 25 TABLET ORAL at 09:39

## 2020-09-07 RX ADMIN — HYDROCODONE BITARTRATE AND ACETAMINOPHEN 1 TABLET: 5; 325 TABLET ORAL at 21:02

## 2020-09-07 RX ADMIN — BISACODYL 10 MG: 10 SUPPOSITORY RECTAL at 11:16

## 2020-09-07 RX ADMIN — SENNOSIDES 8.6 MG: 8.6 TABLET, FILM COATED ORAL at 09:39

## 2020-09-07 RX ADMIN — Medication 10 ML: at 21:28

## 2020-09-07 RX ADMIN — SENNOSIDES 8.6 MG: 8.6 TABLET, FILM COATED ORAL at 21:02

## 2020-09-07 RX ADMIN — RIVAROXABAN 10 MG: 10 TABLET, FILM COATED ORAL at 17:00

## 2020-09-07 RX ADMIN — GABAPENTIN 100 MG: 100 CAPSULE ORAL at 21:02

## 2020-09-07 ASSESSMENT — PAIN SCALES - GENERAL
PAINLEVEL_OUTOF10: 6
PAINLEVEL_OUTOF10: 0

## 2020-09-07 ASSESSMENT — PAIN - FUNCTIONAL ASSESSMENT: PAIN_FUNCTIONAL_ASSESSMENT: PREVENTS OR INTERFERES SOME ACTIVE ACTIVITIES AND ADLS

## 2020-09-07 ASSESSMENT — PAIN DESCRIPTION - PROGRESSION: CLINICAL_PROGRESSION: GRADUALLY IMPROVING

## 2020-09-07 ASSESSMENT — PAIN DESCRIPTION - DESCRIPTORS: DESCRIPTORS: ACHING

## 2020-09-07 ASSESSMENT — PAIN DESCRIPTION - FREQUENCY: FREQUENCY: CONTINUOUS

## 2020-09-07 ASSESSMENT — PAIN DESCRIPTION - PAIN TYPE: TYPE: SURGICAL PAIN

## 2020-09-07 ASSESSMENT — PAIN DESCRIPTION - ORIENTATION: ORIENTATION: RIGHT

## 2020-09-07 ASSESSMENT — PAIN DESCRIPTION - ONSET: ONSET: ON-GOING

## 2020-09-07 ASSESSMENT — PAIN DESCRIPTION - LOCATION: LOCATION: ANKLE

## 2020-09-07 NOTE — PLAN OF CARE
Problem: Falls - Risk of:  Goal: Will remain free from falls  Description: Will remain free from falls  9/7/2020 1010 by Mia Boyce RN  Outcome: Ongoing   Patient at risk for falls. Patient resting quietly in bed. Side rails up x 2 Bed locked in lowest position. Bed alarm on. Bedside table and call light within reach. Patient instructed to call for assistance. Patient verbalized understanding. Will continue to monitor. Problem: Skin Integrity:  Goal: Will show no infection signs and symptoms  Description: Will show no infection signs and symptoms  Outcome: Ongoing   No new skin issues at this time. Problem: Pain:  Goal: Pain level will decrease  Description: Pain level will decrease  9/7/2020 1010 by Mia Boyce RN  Outcome: Ongoing   Pain being controlled with ice, and meds per [facility]. Patient trying to space his pain medication d/t constipation at this time. Rating pain in surgical leg a 3/10 at time time. Will continue to monitor.

## 2020-09-07 NOTE — PLAN OF CARE
Problem: Falls - Risk of:  Goal: Will remain free from falls  Description: Will remain free from falls  9/7/2020 0129 by Lizeth Wynn RN  Outcome: Ongoing  Note: Fall precautions in place. Bed is in lowest position, wheels locked and alarm on. Non-skid socks on. Call light and bedside table within reach. Pt calls out appropriately. Pt is up x 1 assist with a stedy and gait belt. Will continue to assess and monitor. Problem: Pain:  Goal: Pain level will decrease  Description: Pain level will decrease  9/7/2020 0129 by Lizeth Wynn RN  Outcome: Ongoing  Note: Patient uses 0-10 pain scale. Rating surgical pain in right foot as a 7 out of 10 on the scale. Requests oral Vicodin PO PRN with relief. Use of elevation, repositioning, and rest as non-pharmacological measures. Will continue to assess and monitor.

## 2020-09-07 NOTE — PROGRESS NOTES
at 9/7/2020 1353  Gross per 24 hour   Intake 1470 ml   Output 4025 ml   Net -2555 ml      Wt Readings from Last 3 Encounters:   09/03/20 (!) 430 lb 9.6 oz (195.3 kg)     Morbidly obese right leg dressing noticed  General appearance:  Appears comfortable  Eyes: Sclera clear. Pupils equal.  ENT: Moist oral mucosa. Trachea midline, no adenopathy. Cardiovascular: Regular rhythm, normal S1, S2. No murmur. No edema in lower extremities  Respiratory: Not using accessory muscles. Good inspiratory effort. Clear to auscultation bilaterally, no wheeze or crackles. GI: Abdomen soft, no tenderness, not distended, normal bowel sounds  Musculoskeletal: No cyanosis in digits, neck supple  Neurology: CN 2-12 grossly intact. No speech or motor deficits  Psych: Normal affect. Alert and oriented in time, place and person  Skin: Warm, dry, normal turgor  Extremity exam shows brisk capillary refill. Peripheral pulses are palpable in lower extremities     Labs and Tests:  CBC: No results for input(s): WBC, HGB, PLT in the last 72 hours. BMP:  No results for input(s): NA, K, CL, CO2, BUN, CREATININE, GLUCOSE in the last 72 hours. Hepatic: No results for input(s): AST, ALT, ALB, BILITOT, ALKPHOS in the last 72 hours. XR ANKLE RIGHT (2 VIEWS)   Final Result   1. Postsurgical changes related to tibiotalar arthrodesis. FLUORO FOR SURGICAL PROCEDURES   Final Result   1. Postsurgical changes related to tibiotalar arthrodesis. Problem List  Active Problems:    Arthritis of right ankle  Resolved Problems:    * No resolved hospital problems.  *       Assessment & Plan:   Urinary retention postop  Voiding trial today, on Flomax    Diabetes mellitus  Metformin and sliding scale    Morbid obesity with a BMI of 56.81    Status post right plantar arthrodesis, gastroc numerous recession, tendo Achillis lengthening, release of medial ankle tendons  Management as per Ortho    IV Access: Peripheral  Edward: Yes will remove  Diet: DIET GENERAL;  Code:Full Code  DVT PPX Xarelto 10 mg  Disposition awaiting placement      Margarita Cuello MD   9/7/2020 2:54 PM

## 2020-09-08 LAB
ANION GAP SERPL CALCULATED.3IONS-SCNC: 8 MMOL/L (ref 3–16)
BUN BLDV-MCNC: 11 MG/DL (ref 7–20)
CALCIUM SERPL-MCNC: 9 MG/DL (ref 8.3–10.6)
CHLORIDE BLD-SCNC: 99 MMOL/L (ref 99–110)
CO2: 27 MMOL/L (ref 21–32)
CREAT SERPL-MCNC: 0.7 MG/DL (ref 0.9–1.3)
GFR AFRICAN AMERICAN: >60
GFR NON-AFRICAN AMERICAN: >60
GLUCOSE BLD-MCNC: 128 MG/DL (ref 70–99)
GLUCOSE BLD-MCNC: 142 MG/DL (ref 70–99)
PERFORMED ON: ABNORMAL
POTASSIUM REFLEX MAGNESIUM: 4.3 MMOL/L (ref 3.5–5.1)
SODIUM BLD-SCNC: 134 MMOL/L (ref 136–145)

## 2020-09-08 PROCEDURE — 97542 WHEELCHAIR MNGMENT TRAINING: CPT

## 2020-09-08 PROCEDURE — 97110 THERAPEUTIC EXERCISES: CPT

## 2020-09-08 PROCEDURE — 6370000000 HC RX 637 (ALT 250 FOR IP): Performed by: INTERNAL MEDICINE

## 2020-09-08 PROCEDURE — 1200000000 HC SEMI PRIVATE

## 2020-09-08 PROCEDURE — 97116 GAIT TRAINING THERAPY: CPT

## 2020-09-08 PROCEDURE — 2580000003 HC RX 258: Performed by: ORTHOPAEDIC SURGERY

## 2020-09-08 PROCEDURE — 6370000000 HC RX 637 (ALT 250 FOR IP): Performed by: ORTHOPAEDIC SURGERY

## 2020-09-08 PROCEDURE — 80048 BASIC METABOLIC PNL TOTAL CA: CPT

## 2020-09-08 PROCEDURE — 36415 COLL VENOUS BLD VENIPUNCTURE: CPT

## 2020-09-08 PROCEDURE — 6370000000 HC RX 637 (ALT 250 FOR IP): Performed by: HOSPITALIST

## 2020-09-08 PROCEDURE — 6370000000 HC RX 637 (ALT 250 FOR IP): Performed by: PHYSICIAN ASSISTANT

## 2020-09-08 RX ORDER — POLYETHYLENE GLYCOL 3350 17 G/17G
17 POWDER, FOR SOLUTION ORAL DAILY
Status: DISCONTINUED | OUTPATIENT
Start: 2020-09-08 | End: 2020-09-09 | Stop reason: HOSPADM

## 2020-09-08 RX ADMIN — POLYETHYLENE GLYCOL (3350) 17 G: 17 POWDER, FOR SOLUTION ORAL at 10:30

## 2020-09-08 RX ADMIN — RIVAROXABAN 10 MG: 10 TABLET, FILM COATED ORAL at 18:45

## 2020-09-08 RX ADMIN — Medication 10 ML: at 09:00

## 2020-09-08 RX ADMIN — GABAPENTIN 100 MG: 100 CAPSULE ORAL at 15:09

## 2020-09-08 RX ADMIN — GABAPENTIN 100 MG: 100 CAPSULE ORAL at 08:30

## 2020-09-08 RX ADMIN — SENNOSIDES 8.6 MG: 8.6 TABLET, FILM COATED ORAL at 08:30

## 2020-09-08 RX ADMIN — ANTACID TABLETS 500 MG: 500 TABLET, CHEWABLE ORAL at 15:48

## 2020-09-08 RX ADMIN — METHOCARBAMOL 500 MG: 500 TABLET ORAL at 21:05

## 2020-09-08 RX ADMIN — GABAPENTIN 100 MG: 100 CAPSULE ORAL at 21:05

## 2020-09-08 RX ADMIN — SENNOSIDES 8.6 MG: 8.6 TABLET, FILM COATED ORAL at 21:05

## 2020-09-08 RX ADMIN — ANTACID TABLETS 500 MG: 500 TABLET, CHEWABLE ORAL at 21:09

## 2020-09-08 RX ADMIN — LISINOPRIL 10 MG: 10 TABLET ORAL at 08:30

## 2020-09-08 RX ADMIN — DULOXETINE HYDROCHLORIDE 30 MG: 30 CAPSULE, DELAYED RELEASE ORAL at 08:30

## 2020-09-08 RX ADMIN — TAMSULOSIN HYDROCHLORIDE 0.4 MG: 0.4 CAPSULE ORAL at 08:30

## 2020-09-08 RX ADMIN — HYDROCODONE BITARTRATE AND ACETAMINOPHEN 1 TABLET: 5; 325 TABLET ORAL at 21:05

## 2020-09-08 RX ADMIN — METFORMIN HYDROCHLORIDE 500 MG: 500 TABLET, EXTENDED RELEASE ORAL at 08:30

## 2020-09-08 RX ADMIN — TRIAMTERENE AND HYDROCHLOROTHIAZIDE 1 TABLET: 37.5; 25 TABLET ORAL at 08:30

## 2020-09-08 ASSESSMENT — PAIN DESCRIPTION - ONSET: ONSET: ON-GOING

## 2020-09-08 ASSESSMENT — PAIN SCALES - GENERAL
PAINLEVEL_OUTOF10: 6
PAINLEVEL_OUTOF10: 0
PAINLEVEL_OUTOF10: 3
PAINLEVEL_OUTOF10: 0

## 2020-09-08 ASSESSMENT — PAIN DESCRIPTION - FREQUENCY: FREQUENCY: CONTINUOUS

## 2020-09-08 ASSESSMENT — PAIN DESCRIPTION - ORIENTATION: ORIENTATION: RIGHT

## 2020-09-08 ASSESSMENT — PAIN DESCRIPTION - PAIN TYPE: TYPE: SURGICAL PAIN

## 2020-09-08 ASSESSMENT — PAIN DESCRIPTION - PROGRESSION: CLINICAL_PROGRESSION: GRADUALLY IMPROVING

## 2020-09-08 ASSESSMENT — PAIN - FUNCTIONAL ASSESSMENT: PAIN_FUNCTIONAL_ASSESSMENT: PREVENTS OR INTERFERES SOME ACTIVE ACTIVITIES AND ADLS

## 2020-09-08 ASSESSMENT — PAIN DESCRIPTION - LOCATION: LOCATION: ANKLE

## 2020-09-08 ASSESSMENT — PAIN DESCRIPTION - DESCRIPTORS: DESCRIPTORS: ACHING

## 2020-09-08 NOTE — PROGRESS NOTES
[facility]    9/8/2020 2:48 PM        Name: Hiren Branham . Admitted: 9/3/2020  Primary Care Provider: Farhana Nick (Tel: [phone])    Brief Course: This is a morbidly obese 82-year-old male admitted on 9/3/2020 for right ankle surgery past medical history of hypertension, diabetes mellitus hospitalist consulted for medical management.         CC: I want catheter out    Subjective:  .   Feels constipated, did had a removal of catheter and was voiding spontaneously  Reviewed interval ancillary notes    Current Medications  polyethylene glycol (GLYCOLAX) packet 17 g, Daily  bisacodyl (DULCOLAX) suppository 10 mg, Daily PRN  calcium carbonate (TUMS) chewable tablet 500 mg, TID PRN  methocarbamol (ROBAXIN) tablet 500 mg, TID PRN  senna (SENOKOT) tablet 8.6 mg, BID  rivaroxaban (XARELTO) tablet 10 mg, Daily  tamsulosin (FLOMAX) capsule 0.4 mg, Daily  ropivacaine (NAROPIN) 0.5% injection 30 mL, Once  lisinopril (PRINIVIL;ZESTRIL) tablet 10 mg, Daily  DULoxetine (CYMBALTA) extended release capsule 30 mg, Daily  gabapentin (NEURONTIN) capsule 100 mg, TID  metFORMIN (GLUCOPHAGE-XR) extended release tablet 500 mg, Daily with breakfast  triamterene-hydroCHLOROthiazide (MAXZIDE-25) 37.5-25 MG per tablet 1 tablet, Daily  sodium chloride flush 0.9 % injection 10 mL, 2 times per day  sodium chloride flush 0.9 % injection 10 mL, PRN  promethazine (PHENERGAN) tablet 12.5 mg, Q6H PRN    Or  ondansetron (ZOFRAN) injection 4 mg, Q6H PRN  HYDROcodone-acetaminophen (NORCO) 5-325 MG per tablet 1 tablet, Q4H PRN    Or  HYDROcodone-acetaminophen (NORCO) 5-325 MG per tablet 2 tablet, Q4H PRN        Objective:  /72   Pulse 78   Temp 98.2 °F (36.8 °C) (Oral)   Resp 18   Ht 6' 1\" (1.854 m)   Wt (!) 430 lb 9.6 oz (195.3 kg)   SpO2 95%   BMI 56.81 kg/m²     Intake/Output Summary (Last 24 hours) at 9/8/2020 1448  Last data filed at 9/8/2020 0834  Gross per 24 hour   Intake 480 ml   Output 2600 ml   Net -2120 ml      Wt Readings from Last 3 Encounters:   09/03/20 (!) 430 lb 9.6 oz (195.3 kg)     Morbidly obese right leg dressing noticed  General appearance:  Appears comfortable  Eyes: Sclera clear. Pupils equal.  ENT: Moist oral mucosa. Trachea midline, no adenopathy. Cardiovascular: Regular rhythm, normal S1, S2. No murmur. No edema in lower extremities  Respiratory: Not using accessory muscles. Good inspiratory effort. Clear to auscultation bilaterally, no wheeze or crackles. GI: Abdomen soft, no tenderness, not distended, normal bowel sounds  Musculoskeletal: No cyanosis in digits, neck supple  Neurology: CN 2-12 grossly intact. No speech or motor deficits  Psych: Normal affect. Alert and oriented in time, place and person  Skin: Warm, dry, normal turgor  Extremity exam shows brisk capillary refill. Peripheral pulses are palpable in lower extremities     Labs and Tests:  CBC: No results for input(s): WBC, HGB, PLT in the last 72 hours. BMP:    Recent Labs     09/08/20  0445   *   K 4.3   CL 99   CO2 27   BUN 11   CREATININE 0.7*   GLUCOSE 128*     Hepatic: No results for input(s): AST, ALT, ALB, BILITOT, ALKPHOS in the last 72 hours. XR ANKLE RIGHT (2 VIEWS)   Final Result   1. Postsurgical changes related to tibiotalar arthrodesis. FLUORO FOR SURGICAL PROCEDURES   Final Result   1. Postsurgical changes related to tibiotalar arthrodesis. Problem List  Active Problems:    Arthritis of right ankle  Resolved Problems:    * No resolved hospital problems.  *       Assessment & Plan:   Urinary retention postop  Successful voiding trial continue on Flomax for now    Constipation  MiraLAX ordered    Diabetes mellitus  Metformin and sliding scale    Morbid obesity with a BMI of 56.81    Status post right plantar arthrodesis, gastroc numerous recession, tendo Achillis lengthening, release of medial ankle tendons  Management as per Ortho    IV Access: Peripheral  Edward: Yes will remove  Diet: DIET GENERAL;  Code:Full Code  DVT PPX Xarelto 10 mg  Disposition awaiting placement      Margarita Carranza MD   9/8/2020 2:48 PM

## 2020-09-08 NOTE — PROGRESS NOTES
Ortho Staff    Patient comfortable. A. VSS    NVI distally    Dressing C/D/I    Doing well. Continue PT/OT. WIll likely need SNF vs Rehab due to immobility and size.

## 2020-09-08 NOTE — CARE COORDINATION
I called Leila in admissions at [facility] in [facility] and they are able to accept and asked me to fax clinicals to [phone]. I faxed everything to them and Aury gardner was initiated today. They do NOT need another COVID test for this patient.      Electronically signed by Joanne Zaman RN on 9/8/2020 at 5:32 PM  [phone]

## 2020-09-08 NOTE — PROGRESS NOTES
Physical Therapy  Daily Treatment Note    Discharge Recommendations: Broderick Schneider scored a 16/24 on the AM-PAC short mobility form. Current research shows that an AM-PAC score of 17 or less is typically not associated with a discharge to the patient's home setting. Based on the patient's AM-PAC score and their current functional mobility deficits, it is recommended that the patient have 3-5 sessions per week of Physical Therapy at d/c to increase the patient's independence. Please see assessment section for further patient specific details. Assessment:  Pt with limited gait tolerance due to WB restriction L LE. Mobility effortful. Needs occasional rest breaks due to LIAO. Pt prefers crutches, but more steady with walker. Pt would benefit from continued IP PT at D/C to maximize strength and independence prior to going home with family. Equipment Needs: Defer to next level of care    Chart Reviewed: Yes     Other position/activity restrictions: non weight bearing RLE, up with assistance   Additional Pertinent Hx: Pt is a 54 y.o. male adm 9/3 with OA R ankle. Pt s/p RIGHT PANTALAR  ARTHRODESIS, GASTROCNEMIUS RECESSION;TENDO-ACHILLES LENGTHENING, RELEASE OF MEDIAL ANKLE TENDONS (MULTIPLE) on 9/3. PMH: HTN, DM, bilat foot surgeries, back surgery      Diagnosis: R ankle surgery  Treatment Diagnosis: impaired functional mobility 2/2 decreased balance and endurance    Subjective: Pt in chair initially. Agreeable to working with PT. \"I used the crutches with the nurse earlier and I like them better. \" (compared to walker)  \"It feels good to be up. \"    Pain: 1/10 R foot. Ice packs to area after PT session. Objective:    Transfers  Sit to stand: CGA from chair; Min assist from wheelchair. Pt relying heavily on UEs and momentum.   Stand to sit: CGA into wheelchair; CGA onto bedBed <> chair:    Ambulation  Assistance Level: CGA to Min assist  Assistive device: Bariatric crutches  Distance: 7 ft  Quality of gait: Mildly

## 2020-09-08 NOTE — PROGRESS NOTES
Occupational Therapy  Facility/Department: [facility] 5T ORTHO/NEURO  Daily Treatment Note  NAME: Ruben Muñoz  : 1970  MRN: [id number]    Date of Service: 2020    Discharge Recommendations:  Ruben Muñoz scored a 19/24 on the AM-PAC ADL Inpatient form. Current research shows that an AM-PAC score of 17 or less is typically not associated with a discharge to the patient's home setting. Based on the patient's AM-PAC score and their current ADL deficits, it is recommended that the patient have 3-5 sessions per week of Occupational Therapy at d/c to increase the patient's independence. Please see assessment section for further patient specific details. If patient discharges prior to next session this note will serve as a discharge summary. Please see below for the latest assessment towards goals. OT Equipment Recommendations  Other: defer to next setting    Assessment   Performance deficits / Impairments: Decreased functional mobility ; Decreased endurance;Decreased strength;Decreased ADL status; Decreased balance  Assessment: Pt educated this date on UB exercises with theraband to increase UB strength for functional mobility and transfers. Pt performed 1st set with instruction and completed 2nd set with no vcs. Pt would benefit from continued inpt therapy at d/c before going home to maximize safty and independence with funtional mobility/transfers and ADLs. Continue with POC. Treatment Diagnosis: Decreased functional mobility and ADLs  Prognosis: Good  OT Education: OT Role;Home Exercise Program  Patient Education: pt demonstrated understanding  REQUIRES OT FOLLOW UP: Yes  Activity Tolerance  Activity Tolerance: Patient Tolerated treatment well  Safety Devices  Safety Devices in place: Yes  Type of devices: Call light within reach;Nurse notified; All fall risk precautions in place; Bed alarm in place; Left in bed         Patient Diagnosis(es): There were no encounter diagnoses.       has a past medical history of Anesthesia complication, Diabetes mellitus ([facility] [address] and Hypertension. has a past surgical history that includes back surgery; UPPP; Foot surgery (Bilateral); ARTHRODESIS ANKLE (Right, 9/3/2020); and Achilles tendon surgery (Right, 9/3/2020). Restrictions  Position Activity Restriction  Other position/activity restrictions: non weight bearing RLE, up with assistance  Subjective   General  Chart Reviewed: Yes  Patient assessed for rehabilitation services?: Yes  Additional Pertinent Hx: Pt is a 53 yo M admitted on 9/3 for a RIGHT PANTALAR  ARTHRODESIS, GASTROCNEMIUS RECESSION PMhx: DM, HTN, back surgery. Family / Caregiver Present: No  Referring Practitioner: Kulwant Arce MD  Diagnosis: Primary Osteoarthritis of Right ankle  Subjective  Subjective: Pt supine in bed with HOB raised, finishing lunch. General Comment  Comments: Pt agreeable to therapy session, pleasant and talkative. Pt with yellow theraband performed the following UB exercises: 15 chest pulls x 2 sets, 15 shoulder lateral pulldowns x 2 sets, 15 right/15 left biceps x 2 sets each and 15 right/15 left triceps x 2 sets each. Exercises written on pt's board and pt to complete 3rd set before bed. Pt educated on importance of completing 3 sets a day to improve UB strength to assist with functional transfers/mobility. Pt verbalized understanding. Pt setup for grooming (oral care/wash face). Call light in place and bed alarm on.   Vital Signs  Patient Currently in Pain: Denies   Orientation     Objective    ADL  Grooming: Setup(supine in bed with HOB raised (oral care/wash face))                                      Cognition  Overall Cognitive Status: WNL                                         Plan   Plan  Times per week: 5-7  Current Treatment Recommendations: Strengthening, Balance Training, Functional Mobility Training, Endurance Training, Self-Care / ADL  G-Code     OutComes Score                                                  AM-PAC

## 2020-09-08 NOTE — PLAN OF CARE
Problem: Falls - Risk of:  Goal: Will remain free from falls  Description: Will remain free from falls  9/8/2020 1102 by Juanita Roe RN  Outcome: Ongoing  9/8/2020 0052 by Alexa Terry RN  Outcome: Ongoing  Note: Patient will remain free from falls. Patient alert and orientated and uses call light appropriately. Fall precautions in place. Bed locked and in lowest position, bed alarm on, nonskid socks on. Call light within reach. Problem: Pain:  Goal: Pain level will decrease  Description: Pain level will decrease  9/8/2020 1102 by Juanita Roe RN  Outcome: Ongoing  9/8/2020 0052 by Alexa Terry RN  Outcome: Ongoing  Note: Patient refusing most narcotics d/t side effect of constipation. Patient pain controlled with oral medications and ice therapy. Patient currently resting in bed and states pain relief. Will continue to assess and monitor.

## 2020-09-08 NOTE — PLAN OF CARE
Problem: Falls - Risk of:  Goal: Will remain free from falls  Description: Will remain free from falls  Outcome: Ongoing  Note: Patient will remain free from falls. Patient alert and orientated and uses call light appropriately. Fall precautions in place. Bed locked and in lowest position, bed alarm on, nonskid socks on. Call light within reach. Problem: Pain:  Goal: Pain level will decrease  Description: Pain level will decrease  Outcome: Ongoing  Note: Patient refusing most narcotics d/t side effect of constipation. Patient pain controlled with oral medications and ice therapy. Patient currently resting in bed and states pain relief. Will continue to assess and monitor.

## 2020-09-08 NOTE — PROGRESS NOTES
Patient alert and orientated. VSS. Patient pain controlled with oral medications overnight. Neuro status stable, patient able to wiggle toes on RLE. Surgical dressing is CDI. Fall precautions in place. Call light within reach. Will continue to assess and monitor.

## 2020-09-09 VITALS
SYSTOLIC BLOOD PRESSURE: 119 MMHG | DIASTOLIC BLOOD PRESSURE: 84 MMHG | HEIGHT: 73 IN | TEMPERATURE: 98.2 F | BODY MASS INDEX: 41.75 KG/M2 | HEART RATE: 91 BPM | WEIGHT: 315 LBS | RESPIRATION RATE: 16 BRPM | OXYGEN SATURATION: 96 %

## 2020-09-09 LAB
GLUCOSE BLD-MCNC: 120 MG/DL (ref 70–99)
GLUCOSE BLD-MCNC: 78 MG/DL (ref 70–99)
PERFORMED ON: ABNORMAL
PERFORMED ON: NORMAL

## 2020-09-09 PROCEDURE — 97116 GAIT TRAINING THERAPY: CPT

## 2020-09-09 PROCEDURE — 6370000000 HC RX 637 (ALT 250 FOR IP): Performed by: HOSPITALIST

## 2020-09-09 PROCEDURE — 2580000003 HC RX 258: Performed by: ORTHOPAEDIC SURGERY

## 2020-09-09 PROCEDURE — 97535 SELF CARE MNGMENT TRAINING: CPT

## 2020-09-09 PROCEDURE — 6370000000 HC RX 637 (ALT 250 FOR IP): Performed by: INTERNAL MEDICINE

## 2020-09-09 PROCEDURE — 97530 THERAPEUTIC ACTIVITIES: CPT

## 2020-09-09 PROCEDURE — 6370000000 HC RX 637 (ALT 250 FOR IP): Performed by: ORTHOPAEDIC SURGERY

## 2020-09-09 PROCEDURE — 97542 WHEELCHAIR MNGMENT TRAINING: CPT

## 2020-09-09 RX ORDER — METHOCARBAMOL 500 MG/1
500 TABLET, FILM COATED ORAL 3 TIMES DAILY PRN
Qty: 60 TABLET | Refills: 1 | Status: SHIPPED | OUTPATIENT
Start: 2020-09-09 | End: 2020-09-19

## 2020-09-09 RX ORDER — HYDROCODONE BITARTRATE AND ACETAMINOPHEN 5; 325 MG/1; MG/1
1-2 TABLET ORAL
Qty: 40 TABLET | Refills: 0 | COMMUNITY
Start: 2020-09-09 | End: 2020-09-16

## 2020-09-09 RX ADMIN — METFORMIN HYDROCHLORIDE 500 MG: 500 TABLET, EXTENDED RELEASE ORAL at 08:28

## 2020-09-09 RX ADMIN — LISINOPRIL 10 MG: 10 TABLET ORAL at 08:28

## 2020-09-09 RX ADMIN — Medication 10 ML: at 10:25

## 2020-09-09 RX ADMIN — POLYETHYLENE GLYCOL (3350) 17 G: 17 POWDER, FOR SOLUTION ORAL at 08:28

## 2020-09-09 RX ADMIN — GABAPENTIN 100 MG: 100 CAPSULE ORAL at 08:28

## 2020-09-09 RX ADMIN — SENNOSIDES 8.6 MG: 8.6 TABLET, FILM COATED ORAL at 08:28

## 2020-09-09 RX ADMIN — TRIAMTERENE AND HYDROCHLOROTHIAZIDE 1 TABLET: 37.5; 25 TABLET ORAL at 08:28

## 2020-09-09 RX ADMIN — DULOXETINE HYDROCHLORIDE 30 MG: 30 CAPSULE, DELAYED RELEASE ORAL at 08:28

## 2020-09-09 RX ADMIN — TAMSULOSIN HYDROCHLORIDE 0.4 MG: 0.4 CAPSULE ORAL at 08:28

## 2020-09-09 ASSESSMENT — PAIN SCALES - GENERAL: PAINLEVEL_OUTOF10: 0

## 2020-09-09 NOTE — PROGRESS NOTES
Physical Therapy  Daily Treatment Note    Discharge Recommendations: Sharon Burgess scored a 16/24 on the AM-PAC short mobility form. Current research shows that an AM-PAC score of 17 or less is typically not associated with a discharge to the patient's home setting. Based on the patient's AM-PAC score and their current functional mobility deficits, it is recommended that the patient have 3-5 sessions per week of Physical Therapy at d/c to increase the patient's independence. Please see assessment section for further patient specific details. Assessment:  Pt with limited gait tolerance at this time due to NWB L LE and fatigues after short distance. Pt needing rest breaks between activities due to fatigue/SOB with activity. Recovers well with rest. Pt relying on sturdy furniture and assist from son for transfers at home. Transfers quite effortful for pt. Continues to be below baseline for mobility. Pt has step into house. Would benefit from continued IP PT at D/C to maximize strength and independence prior to going home with family. Plan is for ARU. Equipment Needs: Defer to next level of care    Chart Reviewed: Yes     Other position/activity restrictions: non weight bearing RLE, up with assistance   Additional Pertinent Hx: Pt is a 54 y.o. male adm 9/3 with OA R ankle. Pt s/p RIGHT PANTALAR  ARTHRODESIS, GASTROCNEMIUS RECESSION;TENDO-ACHILLES LENGTHENING, RELEASE OF MEDIAL ANKLE TENDONS (MULTIPLE) on 9/3. PMH: HTN, DM, bilat foot surgeries, back surgery      Diagnosis: R ankle surgery   Treatment Diagnosis: impaired functional mobility 2/2 decreased balance and endurance    Subjective: Pt in wheelchair initially. Agreeable to working with PT. Pt hearing about [facility] precert approval during therapy session. \"I'm so happy. I am going to work so hard over there. \"  \"I usually have my son hold onto my crutch and I pull up on it. \" (re: sit to stand at home)    Pain: 3/10 R ankle    Objective: Transfers  Sit to stand: CGA from wheelchair; CGA from chair. Effortful and relying on bedrail/wall rail and momentum. Stand to sit: CGA into chair; CGA into wheelchair. Decreased eccentric control noted. Ambulation  Assistance Level: CGA  Assistive device: Bariatric wheeled walker  Distance: 10 ft x 2. Seated rest break between walks. Quality of gait: Effortful; NWB R LE; decreased pace; heavy reliance on walker for support due to NWB status. Wheelchair Mobility  350 ft in kulkarni, 10 ft x 3 in room with Supervision. Pt needing occasional reminders for locking/unlocking brakes. Pt using B UEs initially to propel, but then using L LE to assist with increased fatigue. Patient Education  Calling for assist with needs. Expressed understanding. Safe hand placement with transfers when using walker. Safety Devices  Pt left with needs in reach. In wheelchair with chair alarm on. RN updated. AM-PAC score  AM-PAC Inpatient Mobility Raw Score : 16  AM-PAC Inpatient T-Scale Score : 40.78  Mobility Inpatient CMS 0-100% Score: 54.16  Mobility Inpatient CMS G-Code Modifier : CK    Goals: (as determined and assessed by primary PT)  Time Frame for Short term goals: By discharge  Short term goal 1: Sup to sit modified independent. Ongoing   Short term goal 2: Pt will transfer sit to stand SBA NWB RLE with LRAD. Ongoing   Short term goal 3: Pt will amb 5' with LRAD NWB RLE SBA. Ongoing  Short term goal 4: Pt will up/down curb step with LRAD NWB RLE CGA.   Ongoing     Plan:  Times per week: 5-7;    Current Treatment Recommendations: Functional Mobility Training, Balance Training, Safety Education & Training, Patient/Caregiver Education & Training, Gait Training, Stair training    Therapy Time    Individual  Concurrent  Group  Co-treatment    Time In  1040            Time Out  1120            Minutes  40              Timed Code Treatment Minutes: 40  Total Treatment Minutes: 40    Will continue per plan of care if not D/Chris to [facility] later today. If patient is discharged prior to next treatment, this note will serve as the discharge summary.     Carlton Wallace #[id number]

## 2020-09-09 NOTE — PROGRESS NOTES
Patient discharged from 56. Discharge packet given to patient's wife. Instructed wife to give to RN at facility. IVs removed. Patient left with belongings by wheelchair.

## 2020-09-09 NOTE — PROGRESS NOTES
Occupational Therapy  Facility/Department: [facility] 5T ORTHO/NEURO  Daily Treatment Note  NAME: Yusra Jackson  : 1970  MRN: [id number]    Date of Service: 2020    Discharge Recommendations:  Yusra Jackson scored a 21/24 on the AM-PAC ADL Inpatient form. Current research shows that an AM-PAC score of 17 or less is typically not associated with a discharge to the patient's home setting. Based on the patient's AM-PAC score and their current ADL deficits, it is recommended that the patient have 3-5 sessions per week of Occupational Therapy at d/c to increase the patient's independence. Please see assessment section for further patient specific details. If patient discharges prior to next session this note will serve as a discharge summary. Please see below for the latest assessment towards goals. OT Equipment Recommendations  Other: defer to next setting    Assessment   Performance deficits / Impairments: Decreased functional mobility ; Decreased endurance;Decreased strength;Decreased ADL status; Decreased balance  Assessment: Pt actively particpated in therapy session this date focused on ADLs. Pt CGA for functional mobility/transfer/LB ADLs. Pt would benefit from continued inpt therapy at d/c before going home to maximize safeety and independence with functional mobility and transfers and ADLs. Continue with POC. Treatment Diagnosis: Decreased functional mobility and ADLs  Prognosis: Good  OT Education: OT Role;Transfer Training;ADL Adaptive Strategies  Patient Education: pt demonstrated understanding  REQUIRES OT FOLLOW UP: Yes  Activity Tolerance  Activity Tolerance: Patient Tolerated treatment well  Safety Devices  Safety Devices in place: Yes  Type of devices: Call light within reach;Nurse notified; All fall risk precautions in place; Left in chair;Chair alarm in place(pt left in wc with alarm on and access to call light)         Patient Diagnosis(es): There were no encounter diagnoses.       has a past medical history of Anesthesia complication, Diabetes mellitus ([facility] [address] and Hypertension. has a past surgical history that includes back surgery; UPPP; Foot surgery (Bilateral); ARTHRODESIS ANKLE (Right, 9/3/2020); and Achilles tendon surgery (Right, 9/3/2020). Restrictions  Position Activity Restriction  Other position/activity restrictions: non weight bearing RLE, up with assistance  Subjective   General  Chart Reviewed: Yes  Patient assessed for rehabilitation services?: Yes  Additional Pertinent Hx: Pt is a 55 yo M admitted on 9/3 for a RIGHT PANTALAR  ARTHRODESIS, GASTROCNEMIUS RECESSION PMhx: DM, HTN, back surgery. Family / Caregiver Present: No  Referring Practitioner: Leslie Decker MD  Diagnosis: Primary Osteoarthritis of Right ankle  Subjective  Subjective: Pt supine in bed with HOB raised, pt pleasant, talkative and agreeable to therapy session. Pt requesting to toilet. General Comment  Comments: Pt supine to sit Mod I. Pt sit EOB Independent. Pt requesting to use [facility] crutches vs rw. Pt sit to stand CGA, pt ambulated CGA and able to maintain NWB RLE ~12 ft to toilet in bathroom. Pt toilet transfer CGA, pt toileted (SBA seated on toilet for extensive time to perform rear hygiene care with use of back massager as toileting aid (pt brought from home). Pt ambulated CGA to sink ~4 ft and stand to sit CGA onto [facility] BS. Pt with grooming (oral care/wash face) Independent. Pt washed UB (Mod I) and washed LB (CGA in stance for rear hygiene caare and left foot washed seated on bed). Pt dressed UB - tshirt (setup) and LB - brief/pants/sock/shoe (CGA in stance for brief/shorts and shoe/sock donned on bed at setup). Pt ~10 ft back to EOB CGA, stand to sit CGA (LLe washed and shoe/sock donned). Pt stand step CGA EOB to wc. Call light in reach, chair alarm put on wc and RN aware.   Vital Signs  Patient Currently in Pain: Denies   Orientation  Orientation  Overall Orientation Status: Within Normal Limits  Objective    ADL  Equipment Provided: (pt used massager for rear hygiene care placing wipes on massage ball)  Grooming: Independent  UE Bathing: Modified independent   LE Bathing: Contact guard assistance  UE Dressing: Setup  LE Dressing: Setup;Contact guard assistance  Toileting: Setup;Stand by assistance; Increased time to complete        Balance  Sitting Balance: Independent  Standing Balance: Contact guard assistance  Standing Balance  Time: ~4 min total  Activity: EOB to toilet to BSC at sink to EOB to wc, LB ADLs  Functional Mobility  Functional - Mobility Device: Crutches  Activity: To/from bathroom  Assist Level: Contact guard assistance  Toilet Transfers  Toilet - Technique: Ambulating  Equipment Used: Standard toilet  Toilet Transfer: Contact guard assistance  Bed mobility  Supine to Sit: Modified independent  Transfers  Sit to stand: Contact guard assistance  Stand to sit: Contact guard assistance                       Cognition  Overall Cognitive Status: WNL                                         Plan   Plan  Times per week: 5-7  Current Treatment Recommendations: Strengthening, Balance Training, Functional Mobility Training, Endurance Training, Self-Care / ADL  G-Code     OutComes Score                                                  AM-PAC Score        AM-[facility] Inpatient Daily Activity Raw Score: 21 (09/09/20 1026)  AM-PAC Inpatient ADL T-Scale Score : 44.27 (09/09/20 1026)  ADL Inpatient CMS 0-100% Score: 32.79 (09/09/20 1026)  ADL Inpatient CMS G-Code Modifier : Sp Rey (09/09/20 1026)    Goals  Short term goals  Time Frame for Short term goals: discharge  Short term goal 1: Transfer to/from [facility] with CGA - goal not addressed 9/8, goal met standard toilet 9/9  Short term goal 2: Stance with CGA for 3 mins while maintaining precautions and engaging in functional mobility/ADL - goal not addressed 9/8, goal not met 9/9  Short term goal 3: Complete UE HEP with min cues to increase UE strength for functional mobility/ADL - goal met 9/8  Patient Goals   Patient goals : return home       Therapy Time   Individual Concurrent Group Co-treatment   Time In 0837         Time Out 0932         Minutes 55         Timed Code Treatment Minutes: [address] [facility], [address] [place] [address]

## 2020-09-09 NOTE — CARE COORDINATION
PT AM-PAC Score: 16 /24  Current OT AM-PAC Score: 21 /24      DISCHARGE Disposition: Inpatient Rehab: [facility] Phone: [phone] Fax: [phone]    LOC at discharge: Skilled  Jordana Arellano Completed: Yes    Notification completed in HENS/PAS?:  Not Applicable    IMM Completed:   Not Indicated    Transportation:  Transportation PLAN for discharge: family   Mode of Transport: Private Car      Home Care:  Home Care ordered at discharge: Not [address]: Not Applicable  Orders faxed: No    Durable Medical Equipment:  DME Provider:    Equipment obtained during hospitalization:      Home Oxygen and Respiratory Equipment:  Oxygen needed at discharge?: Not [address]: Not Applicable  Portable tank available for discharge?: Not Indicated    Dialysis:  Dialysis patient: No    Dialysis Center:  Not Applicable    Hospice Services:  Location: Not Applicable  Agency: Not Applicable    Consents signed: Not Indicated    Referrals made at [facility] for outpatient continued care:  Not Applicable    Additional CM Notes:   Pt's wife to transport to [facility]. Pt to stop at Dr. Brooks Montes De Oca office by 3:30 pm.    Report: [phone]  Fax: [phone]    The Plan for Transition of Care is related to the following treatment goals of Primary osteoarthritis of right ankle [M19.071]  Other acquired deformities of right foot [M21.6X1]  Short Achilles tendon, right [M67.01]  Arthritis of right ankle [M19.071]    The Patient and/or patient representative Johnie Marquez and his family were provided with a choice of provider and agrees with the discharge plan Not Indicated    Freedom of choice list was provided with basic dialogue that supports the patient's individualized plan of care/goals and shares the quality data associated with the providers.  Not Indicated    Care Transitions patient: No    Caroline Courtney RN  [facility]  Case Management Department  Ph: [phone]

## 2020-09-09 NOTE — PLAN OF CARE
Problem: Falls - Risk of:  Goal: Will remain free from falls  Description: Will remain free from falls  Outcome: Ongoing  Note: Patient will remain free from falls. Patient alert and orientated and uses call light appropriately. Fall precautions in place. Bed locked and in lowest position, bed alarm on, nonskid socks on. Call light within reach. Problem: Pain:  Goal: Pain level will decrease  Description: Pain level will decrease  Outcome: Ongoing  Note: Patient pain is controlled with oral medication. Patient resting comfortably in bed. Will continue to assess and monitor.

## 2020-09-09 NOTE — DISCHARGE SUMMARY
[place] DISCHARGE SUMMARY         The patient was taken to the operating room on 9/3/2020 where the aforementioned procedure was preformed. The patient was taken to the post operative anesthesia recovery unit in stable condition. The patient was then transferred to the orthopaedic floor for post operative pain management and convalesce       The patient was followed medically in the hospital for the above surgical procedures performed and below medical issues during their hospital stay    Active Problems:    Arthritis of right ankle  Resolved Problems:    * No resolved hospital problems. *         (x )The patient was placed on anticoagulation therapy for DVT prophylaxis -- Xarelto for DVT prophylaxis      The patient was discharged in stable condition on 9/9/2020. Please see medical reconciliation for discharge medications. The discharge instructions were explained to the patient and the family. The patient will follow up in the office for dressing change and cast application upon discharge.

## 2020-10-07 ENCOUNTER — OFFICE VISIT (OUTPATIENT)
Dept: FAMILY MEDICINE CLINIC | Facility: CLINIC | Age: 50
End: 2020-10-07

## 2020-10-07 VITALS
SYSTOLIC BLOOD PRESSURE: 128 MMHG | BODY MASS INDEX: 41.75 KG/M2 | HEIGHT: 73 IN | DIASTOLIC BLOOD PRESSURE: 88 MMHG | TEMPERATURE: 98.2 F | RESPIRATION RATE: 20 BRPM | HEART RATE: 81 BPM | OXYGEN SATURATION: 99 % | WEIGHT: 315 LBS

## 2020-10-07 DIAGNOSIS — M25.571 CHRONIC PAIN OF RIGHT ANKLE: ICD-10-CM

## 2020-10-07 DIAGNOSIS — Z01.818 PRE-OP EXAM: Primary | ICD-10-CM

## 2020-10-07 DIAGNOSIS — G89.29 CHRONIC PAIN OF RIGHT ANKLE: ICD-10-CM

## 2020-10-07 LAB
ANION GAP SERPL CALCULATED.3IONS-SCNC: 12.9 MMOL/L (ref 5–15)
BUN SERPL-MCNC: 15 MG/DL (ref 6–20)
BUN/CREAT SERPL: 20.5 (ref 7–25)
CALCIUM SPEC-SCNC: 9.1 MG/DL (ref 8.6–10.5)
CHLORIDE SERPL-SCNC: 102 MMOL/L (ref 98–107)
CO2 SERPL-SCNC: 24.1 MMOL/L (ref 22–29)
CREAT SERPL-MCNC: 0.73 MG/DL (ref 0.76–1.27)
DEPRECATED RDW RBC AUTO: 40.7 FL (ref 37–54)
ERYTHROCYTE [DISTWIDTH] IN BLOOD BY AUTOMATED COUNT: 13.1 % (ref 12.3–15.4)
GFR SERPL CREATININE-BSD FRML MDRD: 115 ML/MIN/1.73
GLUCOSE SERPL-MCNC: 105 MG/DL (ref 65–99)
HCT VFR BLD AUTO: 41.1 % (ref 37.5–51)
HGB BLD-MCNC: 13.8 G/DL (ref 13–17.7)
MCH RBC QN AUTO: 28.7 PG (ref 26.6–33)
MCHC RBC AUTO-ENTMCNC: 33.6 G/DL (ref 31.5–35.7)
MCV RBC AUTO: 85.4 FL (ref 79–97)
PLATELET # BLD AUTO: 226 10*3/MM3 (ref 140–450)
PMV BLD AUTO: 12.5 FL (ref 6–12)
POTASSIUM SERPL-SCNC: 4.4 MMOL/L (ref 3.5–5.2)
RBC # BLD AUTO: 4.81 10*6/MM3 (ref 4.14–5.8)
SODIUM SERPL-SCNC: 139 MMOL/L (ref 136–145)
WBC # BLD AUTO: 5.21 10*3/MM3 (ref 3.4–10.8)

## 2020-10-07 PROCEDURE — 99214 OFFICE O/P EST MOD 30 MIN: CPT | Performed by: FAMILY MEDICINE

## 2020-10-07 PROCEDURE — 36415 COLL VENOUS BLD VENIPUNCTURE: CPT | Performed by: FAMILY MEDICINE

## 2020-10-07 PROCEDURE — 90471 IMMUNIZATION ADMIN: CPT | Performed by: FAMILY MEDICINE

## 2020-10-07 PROCEDURE — 85027 COMPLETE CBC AUTOMATED: CPT | Performed by: FAMILY MEDICINE

## 2020-10-07 PROCEDURE — 80048 BASIC METABOLIC PNL TOTAL CA: CPT | Performed by: FAMILY MEDICINE

## 2020-10-07 PROCEDURE — 90686 IIV4 VACC NO PRSV 0.5 ML IM: CPT | Performed by: FAMILY MEDICINE

## 2020-10-07 RX ORDER — TRAZODONE HYDROCHLORIDE 50 MG/1
50 TABLET ORAL NIGHTLY
COMMUNITY
End: 2020-10-07 | Stop reason: SDUPTHER

## 2020-10-07 RX ORDER — DULOXETIN HYDROCHLORIDE 30 MG/1
30 CAPSULE, DELAYED RELEASE ORAL DAILY
Qty: 30 CAPSULE | Refills: 1 | Status: SHIPPED | OUTPATIENT
Start: 2020-10-07 | End: 2021-07-29

## 2020-10-07 RX ORDER — TRAZODONE HYDROCHLORIDE 50 MG/1
50 TABLET ORAL NIGHTLY
Qty: 30 TABLET | Refills: 0 | Status: SHIPPED | OUTPATIENT
Start: 2020-10-07 | End: 2020-11-02

## 2020-10-07 RX ORDER — GABAPENTIN 100 MG/1
100 CAPSULE ORAL 3 TIMES DAILY
Qty: 90 CAPSULE | Refills: 1 | Status: SHIPPED | OUTPATIENT
Start: 2020-10-07 | End: 2021-07-29

## 2020-10-07 RX ORDER — SENNA PLUS 8.6 MG/1
1 TABLET ORAL 2 TIMES DAILY
COMMUNITY
End: 2022-03-08

## 2020-10-08 NOTE — PROGRESS NOTES
"Subjective   Ming Swenson is a 50 y.o. male.     Patient recently had surgery on the right ankle with some fusion and hardware placed.  He is now planning on going back for more surgery on October 22 on the front of his foot.  He needs an update on his preop exam.  He is doing generally well he is on an anticoagulant at this time.       The following portions of the patient's history were reviewed and updated as appropriate: allergies, current medications, past social history and problem list.    Review of Systems   Constitutional: Negative for chills and fever.   HENT: Negative for congestion and sore throat.    Respiratory: Negative for cough and shortness of breath.    Cardiovascular: Positive for leg swelling. Negative for chest pain.   Gastrointestinal: Negative for diarrhea, nausea and vomiting.   Endocrine: Negative for cold intolerance and heat intolerance.   Genitourinary: Negative for dysuria and hematuria.   Musculoskeletal: Positive for arthralgias.        Right foot in a cast up to the knee   Neurological: Negative for dizziness and seizures.       Objective   /88 (BP Location: Other (Comment), Patient Position: Sitting) Comment (BP Location): left wrist  Pulse 81   Temp 98.2 °F (36.8 °C)   Resp 20   Ht 185.4 cm (73\")   Wt (!) 197 kg (435 lb)   SpO2 99%   BMI 57.39 kg/m²   Physical Exam  Vitals signs and nursing note reviewed.   Constitutional:       Appearance: He is obese.   HENT:      Head: Normocephalic and atraumatic.   Neck:      Musculoskeletal: Normal range of motion.   Cardiovascular:      Rate and Rhythm: Rhythm irregular.      Pulses: Normal pulses.      Heart sounds: Normal heart sounds.   Pulmonary:      Effort: Pulmonary effort is normal.      Breath sounds: Normal breath sounds.   Musculoskeletal: Normal range of motion.   Skin:     General: Skin is warm and dry.   Neurological:      General: No focal deficit present.      Mental Status: He is alert.   Psychiatric:         " Mood and Affect: Mood normal.         Assessment/Plan   Problem List Items Addressed This Visit     None      Visit Diagnoses     Pre-op exam    -  Primary    Relevant Orders    COVID-19,[facility],NP/OP Swab in Transport Media or ESwab 72 HR TAT - Swab, Nasopharynx    Basic Metabolic Panel (Completed)    CBC (No Diff) (Completed)    Chronic pain of right ankle        Relevant Medications    gabapentin (NEURONTIN) 100 MG capsule          New Medications Ordered This Visit   Medications   • DULoxetine (CYMBALTA) 30 MG capsule     Sig: Take 1 capsule by mouth Daily.     Dispense:  30 capsule     Refill:  1   • gabapentin (NEURONTIN) 100 MG capsule     Sig: Take 1 capsule by mouth 3 (Three) Times a Day.     Dispense:  90 capsule     Refill:  1   • traZODone (DESYREL) 50 MG tablet     Sig: Take 1 tablet by mouth Every Night.     Dispense:  30 tablet     Refill:  0     Complete form for preop, when lab results are available.  EKG was not repeated.  He was given an order for COVID-19 swab which she will get preoperatively

## 2020-10-12 ENCOUNTER — TELEPHONE (OUTPATIENT)
Dept: FAMILY MEDICINE CLINIC | Facility: CLINIC | Age: 50
End: 2020-10-12

## 2020-10-12 NOTE — TELEPHONE ENCOUNTER
PATIENT STATES HE HAS LOST AN ENTIRE BOTTLE OF BLOOD PRESSURE MEDICATION.     benazepril (LOTENSIN) 10 MG tablet    PHARMACY:  [address]     DID NOT HAVE DOSAGE FOR YESTERDAY.     BEST CALL NUMBER [phone]

## 2020-10-13 NOTE — PROGRESS NOTES
[facility] / [facility] ([facility]) [address] [facility], [address]    Acknowledgment of Informed Consent for Surgical or Medical Procedure and Sedation  I agree to allow doctor(s) [facility] and his/her associates or assistants, including residents and/or other qualified medical practitioner to perform the following medical treatment or procedure and to administer or direct the administration of sedation as necessary:  Procedure(s): RIGHT MULTIPLE METATARSAL OSTEOTOMIES, HALLUX INTERPHALANGEAL JOINT ARTHRODESIS, HAMMER TOE CORRECTION, 2,3, POSSIBLY 4,5  My doctor has explained the following regarding the proposed procedure:   the explanation of the procedure   the benefits of the procedure   the potential problems that might occur during recuperation   the risks and side effects of the procedure which could include but are not limited to severe blood loss, infection, stroke or death   the benefits, risks and side effect of alternative procedures including the consequences of declining this procedure or any alternative procedures   the likelihood of achieving satisfactory results. I acknowledge no guarantee or assurance has been made to me regarding the results. I understand that during the course of this treatment/procedure, unforeseen conditions can occur which require an additional or different procedure. I agree to allow my physician or assistants to perform such extension of the original procedure as they may find necessary. I understand that sedation will often result in temporary impairment of memory and fine motor skills and that sedation can occasionally progress to a state of deep sedation or general anesthesia. I understand the risks of anesthesia for surgery include, but are not limited to, sore throat, hoarseness, injury to face, mouth, or teeth; nausea; headache; injury to blood vessels or nerves; death, brain damage, or paralysis.     I understand that if I have a Limitation of Treatment order in effect during my hospitalization, the order may or may not be in effect during this procedure. I give my doctor permission to give me blood or blood products. I understand that there are risks with receiving blood such as hepatitis, AIDS, fever, or allergic reaction. I acknowledge that the risks, benefits, and alternatives of this treatment have been explained to me and that no express or implied warranty has been given by the hospital, any blood bank, or any person or entity as to the blood or blood components transfused. At the discretion of my doctor, I agree to allow observers, equipment/product representatives and allow photographing, and/or televising of the procedure, provided my name or identity is maintained confidentially. I agree the hospital may dispose of or use for scientific or educational purposes any tissue, fluid, or body parts which may be removed.     ________________________________Date________Time______ am/pm  ([place] [facility])  Patient or Signature of Closest Relative or Legal Guardian    ________________________________Date________Time______am/pm      Page 1 of  1  Witness

## 2020-10-16 PROCEDURE — U0003 INFECTIOUS AGENT DETECTION BY NUCLEIC ACID (DNA OR RNA); SEVERE ACUTE RESPIRATORY SYNDROME CORONAVIRUS 2 (SARS-COV-2) (CORONAVIRUS DISEASE [COVID-19]), AMPLIFIED PROBE TECHNIQUE, MAKING USE OF HIGH THROUGHPUT TECHNOLOGIES AS DESCRIBED BY CMS-2020-01-R: HCPCS | Performed by: FAMILY MEDICINE

## 2020-10-20 NOTE — PROGRESS NOTES
PRE-OP INSTRUCTIONS FOR THE SURGICAL PATIENT YOU ARE UNABLE TO MAKE CONTACT FOR AN INTERVIEW:        1. Follow instructions for your ARRIVAL TIME as DIRECTED BY YOUR SURGEON. 2. Enter the MAIN entrance located on [employer] and report to the desk. 3. Bring your insurance & prescription card and photo ID with you. You may also be asked to pay a co-pay, as you may want to bring a check or credit card with you. 4. Leave all other valuables at home. 5. Arrange for someone to drive you home and be with you for the first 24 hours after discharge. 6. Bring your medication list with you day of surgery with doses and frequency listed  7. You must contact your surgeon for Instructions regarding:              - ALL medication instructions, especially if taking blood thinners, aspirin, or diabetic medication.  - IF  there is a change in your physical condition such as a cold, fever, rash, cuts, sores or any other infection, especially near your surgical site. 8. A Pre-op History and Physical for surgery MUST be completed by your Physician or an Urgent Care within 30 days of your procedure date. Please bring a copy with you on the day of your procedure and along with any other testing performed. 9. DO NOT EAT ANYTHING eight hours prior to surgery. May have up to 8 ounces of water 4 hours prior to surgery. 10. No gum, candy, mints, or ice chips day of procedure. 11. Please refrain from drinking alcohol the day before or day of your procedure. 12. Please do not smoke the day of your procedure. 13. Dress in loose, comfortable clothing appropriate for redressing after your procedure. Do not wear jewelry (including body piercings), make-up, fingernail polish, lotion, powders or metal hairclips. 15. Contacts will need to be removed prior to surgery. You may want to bring your eye glasses to wear immediately before and after surgery.   14. Dentures will need to be removed before your

## 2020-10-21 ENCOUNTER — ANESTHESIA EVENT (OUTPATIENT)
Dept: OPERATING ROOM | Age: 50
End: 2020-10-21
Payer: COMMERCIAL

## 2020-10-22 ENCOUNTER — HOSPITAL ENCOUNTER (OUTPATIENT)
Age: 50
Setting detail: OBSERVATION
Discharge: HOME | End: 2020-10-23
Attending: ORTHOPAEDIC SURGERY | Admitting: ORTHOPAEDIC SURGERY
Payer: COMMERCIAL

## 2020-10-22 ENCOUNTER — ANESTHESIA (OUTPATIENT)
Dept: OPERATING ROOM | Age: 50
End: 2020-10-22
Payer: COMMERCIAL

## 2020-10-22 VITALS — OXYGEN SATURATION: 89 % | TEMPERATURE: 97.9 F | SYSTOLIC BLOOD PRESSURE: 135 MMHG | DIASTOLIC BLOOD PRESSURE: 102 MMHG

## 2020-10-22 PROBLEM — G60.0 CHARCOT MARIE TOOTH MUSCULAR ATROPHY: Status: ACTIVE | Noted: 2020-10-22

## 2020-10-22 LAB
APTT: 30 SEC (ref 24.2–36.2)
GLUCOSE BLD-MCNC: 118 MG/DL (ref 70–99)
GLUCOSE BLD-MCNC: 155 MG/DL (ref 70–99)
INR BLD: 0.94 (ref 0.86–1.14)
PERFORMED ON: ABNORMAL
PERFORMED ON: ABNORMAL
PROTHROMBIN TIME: 10.9 SEC (ref 10–13.2)

## 2020-10-22 PROCEDURE — 6360000002 HC RX W HCPCS: Performed by: ORTHOPAEDIC SURGERY

## 2020-10-22 PROCEDURE — 6360000002 HC RX W HCPCS: Performed by: ANESTHESIOLOGY

## 2020-10-22 PROCEDURE — 2580000003 HC RX 258: Performed by: ORTHOPAEDIC SURGERY

## 2020-10-22 PROCEDURE — C9359 IMPLNT,BON VOID FILLER-PUTTY: HCPCS | Performed by: ORTHOPAEDIC SURGERY

## 2020-10-22 PROCEDURE — 85610 PROTHROMBIN TIME: CPT

## 2020-10-22 PROCEDURE — 2580000003 HC RX 258: Performed by: ANESTHESIOLOGY

## 2020-10-22 PROCEDURE — 3600000004 HC SURGERY LEVEL 4 BASE: Performed by: ORTHOPAEDIC SURGERY

## 2020-10-22 PROCEDURE — 3700000001 HC ADD 15 MINUTES (ANESTHESIA): Performed by: ORTHOPAEDIC SURGERY

## 2020-10-22 PROCEDURE — G0378 HOSPITAL OBSERVATION PER HR: HCPCS

## 2020-10-22 PROCEDURE — 3600000014 HC SURGERY LEVEL 4 ADDTL 15MIN: Performed by: ORTHOPAEDIC SURGERY

## 2020-10-22 PROCEDURE — 2709999900 HC NON-CHARGEABLE SUPPLY: Performed by: ORTHOPAEDIC SURGERY

## 2020-10-22 PROCEDURE — 2700000000 HC OXYGEN THERAPY PER DAY

## 2020-10-22 PROCEDURE — 94150 VITAL CAPACITY TEST: CPT

## 2020-10-22 PROCEDURE — 7100000001 HC PACU RECOVERY - ADDTL 15 MIN: Performed by: ORTHOPAEDIC SURGERY

## 2020-10-22 PROCEDURE — 85730 THROMBOPLASTIN TIME PARTIAL: CPT

## 2020-10-22 PROCEDURE — 94664 DEMO&/EVAL PT USE INHALER: CPT

## 2020-10-22 PROCEDURE — 7100000000 HC PACU RECOVERY - FIRST 15 MIN: Performed by: ORTHOPAEDIC SURGERY

## 2020-10-22 PROCEDURE — 6370000000 HC RX 637 (ALT 250 FOR IP): Performed by: ORTHOPAEDIC SURGERY

## 2020-10-22 PROCEDURE — 2500000003 HC RX 250 WO HCPCS: Performed by: NURSE ANESTHETIST, CERTIFIED REGISTERED

## 2020-10-22 PROCEDURE — 3700000000 HC ANESTHESIA ATTENDED CARE: Performed by: ORTHOPAEDIC SURGERY

## 2020-10-22 PROCEDURE — 6360000002 HC RX W HCPCS: Performed by: NURSE ANESTHETIST, CERTIFIED REGISTERED

## 2020-10-22 PROCEDURE — 2720000010 HC SURG SUPPLY STERILE: Performed by: ORTHOPAEDIC SURGERY

## 2020-10-22 PROCEDURE — 64445 NJX AA&/STRD SCIATIC NRV IMG: CPT | Performed by: ANESTHESIOLOGY

## 2020-10-22 PROCEDURE — 87641 MR-STAPH DNA AMP PROBE: CPT

## 2020-10-22 PROCEDURE — 64447 NJX AA&/STRD FEMORAL NRV IMG: CPT | Performed by: ANESTHESIOLOGY

## 2020-10-22 PROCEDURE — C1713 ANCHOR/SCREW BN/BN,TIS/BN: HCPCS | Performed by: ORTHOPAEDIC SURGERY

## 2020-10-22 DEVICE — SCREW BONE L26MM OD2.6MM CANN HDLSS SELF COMPR: Type: IMPLANTABLE DEVICE | Site: FOOT | Status: FUNCTIONAL

## 2020-10-22 DEVICE — DBX PUTTY, 1CC
Type: IMPLANTABLE DEVICE | Site: FOOT | Status: FUNCTIONAL
Brand: DBX®

## 2020-10-22 DEVICE — IMPLANTABLE DEVICE: Type: IMPLANTABLE DEVICE | Site: FOOT | Status: FUNCTIONAL

## 2020-10-22 DEVICE — SCREW BONE CANN SELF-COMPRESSIVE 2.6MMX24MM: Type: IMPLANTABLE DEVICE | Site: FOOT | Status: FUNCTIONAL

## 2020-10-22 RX ORDER — LISINOPRIL 10 MG/1
10 TABLET ORAL DAILY
Status: DISCONTINUED | OUTPATIENT
Start: 2020-10-22 | End: 2020-10-23 | Stop reason: HOSPADM

## 2020-10-22 RX ORDER — MIDAZOLAM HYDROCHLORIDE 1 MG/ML
INJECTION INTRAMUSCULAR; INTRAVENOUS
Status: COMPLETED
Start: 2020-10-22 | End: 2020-10-22

## 2020-10-22 RX ORDER — ROCURONIUM BROMIDE 10 MG/ML
INJECTION, SOLUTION INTRAVENOUS PRN
Status: DISCONTINUED | OUTPATIENT
Start: 2020-10-22 | End: 2020-10-22 | Stop reason: SDUPTHER

## 2020-10-22 RX ORDER — MORPHINE SULFATE 2 MG/ML
2 INJECTION, SOLUTION INTRAMUSCULAR; INTRAVENOUS
Status: DISCONTINUED | OUTPATIENT
Start: 2020-10-22 | End: 2020-10-23 | Stop reason: HOSPADM

## 2020-10-22 RX ORDER — SUCCINYLCHOLINE/SOD CL,ISO/PF 200MG/10ML
SYRINGE (ML) INTRAVENOUS PRN
Status: DISCONTINUED | OUTPATIENT
Start: 2020-10-22 | End: 2020-10-22 | Stop reason: SDUPTHER

## 2020-10-22 RX ORDER — DIPHENHYDRAMINE HYDROCHLORIDE 50 MG/ML
12.5 INJECTION INTRAMUSCULAR; INTRAVENOUS
Status: DISCONTINUED | OUTPATIENT
Start: 2020-10-22 | End: 2020-10-22 | Stop reason: HOSPADM

## 2020-10-22 RX ORDER — METFORMIN HYDROCHLORIDE 500 MG/1
500 TABLET, EXTENDED RELEASE ORAL
Status: DISCONTINUED | OUTPATIENT
Start: 2020-10-23 | End: 2020-10-23 | Stop reason: HOSPADM

## 2020-10-22 RX ORDER — SODIUM CHLORIDE, SODIUM LACTATE, POTASSIUM CHLORIDE, CALCIUM CHLORIDE 600; 310; 30; 20 MG/100ML; MG/100ML; MG/100ML; MG/100ML
INJECTION, SOLUTION INTRAVENOUS CONTINUOUS
Status: DISCONTINUED | OUTPATIENT
Start: 2020-10-22 | End: 2020-10-23 | Stop reason: HOSPADM

## 2020-10-22 RX ORDER — ROPIVACAINE HYDROCHLORIDE 5 MG/ML
INJECTION, SOLUTION EPIDURAL; INFILTRATION; PERINEURAL
Status: COMPLETED
Start: 2020-10-22 | End: 2020-10-22

## 2020-10-22 RX ORDER — FENTANYL CITRATE 50 UG/ML
INJECTION, SOLUTION INTRAMUSCULAR; INTRAVENOUS PRN
Status: DISCONTINUED | OUTPATIENT
Start: 2020-10-22 | End: 2020-10-22 | Stop reason: SDUPTHER

## 2020-10-22 RX ORDER — MORPHINE SULFATE 2 MG/ML
4 INJECTION, SOLUTION INTRAMUSCULAR; INTRAVENOUS
Status: DISCONTINUED | OUTPATIENT
Start: 2020-10-22 | End: 2020-10-23 | Stop reason: HOSPADM

## 2020-10-22 RX ORDER — LIDOCAINE HYDROCHLORIDE 20 MG/ML
INJECTION, SOLUTION INTRAVENOUS PRN
Status: DISCONTINUED | OUTPATIENT
Start: 2020-10-22 | End: 2020-10-22 | Stop reason: SDUPTHER

## 2020-10-22 RX ORDER — ONDANSETRON 2 MG/ML
4 INJECTION INTRAMUSCULAR; INTRAVENOUS
Status: COMPLETED | OUTPATIENT
Start: 2020-10-22 | End: 2020-10-22

## 2020-10-22 RX ORDER — 0.9 % SODIUM CHLORIDE 0.9 %
500 INTRAVENOUS SOLUTION INTRAVENOUS
Status: DISCONTINUED | OUTPATIENT
Start: 2020-10-22 | End: 2020-10-22 | Stop reason: HOSPADM

## 2020-10-22 RX ORDER — PROPOFOL 10 MG/ML
INJECTION, EMULSION INTRAVENOUS PRN
Status: DISCONTINUED | OUTPATIENT
Start: 2020-10-22 | End: 2020-10-22 | Stop reason: SDUPTHER

## 2020-10-22 RX ORDER — PROCHLORPERAZINE EDISYLATE 5 MG/ML
5 INJECTION INTRAMUSCULAR; INTRAVENOUS
Status: DISCONTINUED | OUTPATIENT
Start: 2020-10-22 | End: 2020-10-22 | Stop reason: HOSPADM

## 2020-10-22 RX ORDER — ROPIVACAINE HYDROCHLORIDE 5 MG/ML
INJECTION, SOLUTION EPIDURAL; INFILTRATION; PERINEURAL PRN
Status: DISCONTINUED | OUTPATIENT
Start: 2020-10-22 | End: 2020-10-22 | Stop reason: SDUPTHER

## 2020-10-22 RX ORDER — MIDAZOLAM HYDROCHLORIDE 1 MG/ML
INJECTION INTRAMUSCULAR; INTRAVENOUS PRN
Status: DISCONTINUED | OUTPATIENT
Start: 2020-10-22 | End: 2020-10-22 | Stop reason: SDUPTHER

## 2020-10-22 RX ORDER — ONDANSETRON 2 MG/ML
INJECTION INTRAMUSCULAR; INTRAVENOUS PRN
Status: DISCONTINUED | OUTPATIENT
Start: 2020-10-22 | End: 2020-10-22 | Stop reason: SDUPTHER

## 2020-10-22 RX ORDER — SODIUM CHLORIDE 0.9 % (FLUSH) 0.9 %
10 SYRINGE (ML) INJECTION PRN
Status: DISCONTINUED | OUTPATIENT
Start: 2020-10-22 | End: 2020-10-23 | Stop reason: HOSPADM

## 2020-10-22 RX ORDER — MEPERIDINE HYDROCHLORIDE 25 MG/ML
12.5 INJECTION INTRAMUSCULAR; INTRAVENOUS; SUBCUTANEOUS EVERY 5 MIN PRN
Status: DISCONTINUED | OUTPATIENT
Start: 2020-10-22 | End: 2020-10-22 | Stop reason: HOSPADM

## 2020-10-22 RX ORDER — PROMETHAZINE HYDROCHLORIDE 12.5 MG/1
12.5 TABLET ORAL EVERY 6 HOURS PRN
Status: DISCONTINUED | OUTPATIENT
Start: 2020-10-22 | End: 2020-10-23 | Stop reason: HOSPADM

## 2020-10-22 RX ORDER — HYDROCODONE BITARTRATE AND ACETAMINOPHEN 5; 325 MG/1; MG/1
2 TABLET ORAL EVERY 4 HOURS PRN
Status: DISCONTINUED | OUTPATIENT
Start: 2020-10-22 | End: 2020-10-23 | Stop reason: HOSPADM

## 2020-10-22 RX ORDER — DULOXETIN HYDROCHLORIDE 30 MG/1
30 CAPSULE, DELAYED RELEASE ORAL DAILY
Status: DISCONTINUED | OUTPATIENT
Start: 2020-10-22 | End: 2020-10-23 | Stop reason: HOSPADM

## 2020-10-22 RX ORDER — FENTANYL CITRATE 50 UG/ML
INJECTION, SOLUTION INTRAMUSCULAR; INTRAVENOUS
Status: COMPLETED
Start: 2020-10-22 | End: 2020-10-22

## 2020-10-22 RX ORDER — TRIAMTERENE AND HYDROCHLOROTHIAZIDE 37.5; 25 MG/1; MG/1
1 TABLET ORAL DAILY
Status: DISCONTINUED | OUTPATIENT
Start: 2020-10-22 | End: 2020-10-23 | Stop reason: HOSPADM

## 2020-10-22 RX ORDER — GABAPENTIN 300 MG/1
300 CAPSULE ORAL 3 TIMES DAILY
Status: DISCONTINUED | OUTPATIENT
Start: 2020-10-22 | End: 2020-10-23 | Stop reason: HOSPADM

## 2020-10-22 RX ORDER — SODIUM CHLORIDE 0.9 % (FLUSH) 0.9 %
10 SYRINGE (ML) INJECTION EVERY 12 HOURS SCHEDULED
Status: DISCONTINUED | OUTPATIENT
Start: 2020-10-22 | End: 2020-10-23 | Stop reason: HOSPADM

## 2020-10-22 RX ORDER — SODIUM CHLORIDE 9 MG/ML
INJECTION, SOLUTION INTRAVENOUS CONTINUOUS
Status: DISCONTINUED | OUTPATIENT
Start: 2020-10-22 | End: 2020-10-23 | Stop reason: HOSPADM

## 2020-10-22 RX ORDER — DEXAMETHASONE SODIUM PHOSPHATE 4 MG/ML
INJECTION, SOLUTION INTRA-ARTICULAR; INTRALESIONAL; INTRAMUSCULAR; INTRAVENOUS; SOFT TISSUE PRN
Status: DISCONTINUED | OUTPATIENT
Start: 2020-10-22 | End: 2020-10-22 | Stop reason: SDUPTHER

## 2020-10-22 RX ORDER — SODIUM CHLORIDE 0.9 % (FLUSH) 0.9 %
10 SYRINGE (ML) INJECTION PRN
Status: DISCONTINUED | OUTPATIENT
Start: 2020-10-22 | End: 2020-10-22 | Stop reason: HOSPADM

## 2020-10-22 RX ORDER — HYDROCODONE BITARTRATE AND ACETAMINOPHEN 5; 325 MG/1; MG/1
1 TABLET ORAL EVERY 4 HOURS PRN
Status: DISCONTINUED | OUTPATIENT
Start: 2020-10-22 | End: 2020-10-23 | Stop reason: HOSPADM

## 2020-10-22 RX ORDER — ONDANSETRON 2 MG/ML
4 INJECTION INTRAMUSCULAR; INTRAVENOUS EVERY 6 HOURS PRN
Status: DISCONTINUED | OUTPATIENT
Start: 2020-10-22 | End: 2020-10-23 | Stop reason: HOSPADM

## 2020-10-22 RX ORDER — SODIUM CHLORIDE 0.9 % (FLUSH) 0.9 %
10 SYRINGE (ML) INJECTION EVERY 12 HOURS SCHEDULED
Status: DISCONTINUED | OUTPATIENT
Start: 2020-10-22 | End: 2020-10-22 | Stop reason: HOSPADM

## 2020-10-22 RX ORDER — HYDRALAZINE HYDROCHLORIDE 20 MG/ML
5 INJECTION INTRAMUSCULAR; INTRAVENOUS EVERY 10 MIN PRN
Status: DISCONTINUED | OUTPATIENT
Start: 2020-10-22 | End: 2020-10-22 | Stop reason: HOSPADM

## 2020-10-22 RX ORDER — LIDOCAINE HYDROCHLORIDE 10 MG/ML
1 INJECTION, SOLUTION EPIDURAL; INFILTRATION; INTRACAUDAL; PERINEURAL
Status: DISCONTINUED | OUTPATIENT
Start: 2020-10-22 | End: 2020-10-22 | Stop reason: HOSPADM

## 2020-10-22 RX ORDER — HYDROMORPHONE HCL 110MG/55ML
PATIENT CONTROLLED ANALGESIA SYRINGE INTRAVENOUS PRN
Status: DISCONTINUED | OUTPATIENT
Start: 2020-10-22 | End: 2020-10-22 | Stop reason: SDUPTHER

## 2020-10-22 RX ADMIN — ONDANSETRON 4 MG: 2 INJECTION INTRAMUSCULAR; INTRAVENOUS at 13:45

## 2020-10-22 RX ADMIN — LISINOPRIL 10 MG: 10 TABLET ORAL at 18:37

## 2020-10-22 RX ADMIN — SODIUM CHLORIDE, SODIUM LACTATE, POTASSIUM CHLORIDE, AND CALCIUM CHLORIDE: 600; 310; 30; 20 INJECTION, SOLUTION INTRAVENOUS at 09:49

## 2020-10-22 RX ADMIN — PROPOFOL 100 MG: 10 INJECTION, EMULSION INTRAVENOUS at 12:57

## 2020-10-22 RX ADMIN — PROPOFOL 150 MG: 10 INJECTION, EMULSION INTRAVENOUS at 12:01

## 2020-10-22 RX ADMIN — MIDAZOLAM HYDROCHLORIDE 2 MG: 2 INJECTION, SOLUTION INTRAMUSCULAR; INTRAVENOUS at 10:00

## 2020-10-22 RX ADMIN — ROPIVACAINE HYDROCHLORIDE 60 ML: 5 INJECTION, SOLUTION EPIDURAL; INFILTRATION; PERINEURAL at 10:00

## 2020-10-22 RX ADMIN — HYDROMORPHONE HYDROCHLORIDE 0.5 MG: 2 INJECTION, SOLUTION INTRAMUSCULAR; INTRAVENOUS; SUBCUTANEOUS at 13:48

## 2020-10-22 RX ADMIN — CEFAZOLIN SODIUM 3 G: 1 POWDER, FOR SOLUTION INTRAMUSCULAR; INTRAVENOUS at 23:39

## 2020-10-22 RX ADMIN — HYDROCODONE BITARTRATE AND ACETAMINOPHEN 1 TABLET: 5; 325 TABLET ORAL at 19:29

## 2020-10-22 RX ADMIN — HYDROMORPHONE HYDROCHLORIDE 1 MG: 2 INJECTION, SOLUTION INTRAMUSCULAR; INTRAVENOUS; SUBCUTANEOUS at 14:04

## 2020-10-22 RX ADMIN — Medication 200 MG: at 11:57

## 2020-10-22 RX ADMIN — TRIAMTERENE AND HYDROCHLOROTHIAZIDE 1 TABLET: 37.5; 25 TABLET ORAL at 18:37

## 2020-10-22 RX ADMIN — SODIUM CHLORIDE: 9 INJECTION, SOLUTION INTRAVENOUS at 16:58

## 2020-10-22 RX ADMIN — SODIUM CHLORIDE, SODIUM LACTATE, POTASSIUM CHLORIDE, AND CALCIUM CHLORIDE: 600; 310; 30; 20 INJECTION, SOLUTION INTRAVENOUS at 13:41

## 2020-10-22 RX ADMIN — GABAPENTIN 300 MG: 300 CAPSULE ORAL at 21:34

## 2020-10-22 RX ADMIN — DEXAMETHASONE SODIUM PHOSPHATE 12 MG: 4 INJECTION, SOLUTION INTRAMUSCULAR; INTRAVENOUS at 12:14

## 2020-10-22 RX ADMIN — LIDOCAINE HYDROCHLORIDE 100 MG: 20 INJECTION, SOLUTION INTRAVENOUS at 11:57

## 2020-10-22 RX ADMIN — PROPOFOL 50 MG: 10 INJECTION, EMULSION INTRAVENOUS at 12:11

## 2020-10-22 RX ADMIN — PROPOFOL 100 MG: 10 INJECTION, EMULSION INTRAVENOUS at 13:53

## 2020-10-22 RX ADMIN — HYDROCODONE BITARTRATE AND ACETAMINOPHEN 2 TABLET: 5; 325 TABLET ORAL at 23:33

## 2020-10-22 RX ADMIN — ROCURONIUM BROMIDE 50 MG: 10 INJECTION INTRAVENOUS at 12:03

## 2020-10-22 RX ADMIN — HYDROMORPHONE HYDROCHLORIDE 0.25 MG: 1 INJECTION, SOLUTION INTRAMUSCULAR; INTRAVENOUS; SUBCUTANEOUS at 16:57

## 2020-10-22 RX ADMIN — HYDROMORPHONE HYDROCHLORIDE 0.5 MG: 2 INJECTION, SOLUTION INTRAMUSCULAR; INTRAVENOUS; SUBCUTANEOUS at 13:40

## 2020-10-22 RX ADMIN — FENTANYL CITRATE 100 MCG: 50 INJECTION INTRAMUSCULAR; INTRAVENOUS at 10:00

## 2020-10-22 RX ADMIN — CEFAZOLIN SODIUM 3 G: 1 POWDER, FOR SOLUTION INTRAMUSCULAR; INTRAVENOUS at 11:50

## 2020-10-22 RX ADMIN — ONDANSETRON 4 MG: 2 INJECTION INTRAMUSCULAR; INTRAVENOUS at 17:01

## 2020-10-22 RX ADMIN — PROPOFOL 300 MG: 10 INJECTION, EMULSION INTRAVENOUS at 11:57

## 2020-10-22 RX ADMIN — DULOXETINE HYDROCHLORIDE 30 MG: 30 CAPSULE, DELAYED RELEASE ORAL at 18:38

## 2020-10-22 RX ADMIN — MORPHINE SULFATE 2 MG: 2 INJECTION, SOLUTION INTRAMUSCULAR; INTRAVENOUS at 17:52

## 2020-10-22 ASSESSMENT — PULMONARY FUNCTION TESTS
PIF_VALUE: 7
PIF_VALUE: 28
PIF_VALUE: 37
PIF_VALUE: 28
PIF_VALUE: 40
PIF_VALUE: 28
PIF_VALUE: 28
PIF_VALUE: 0
PIF_VALUE: 28
PIF_VALUE: 1
PIF_VALUE: 21
PIF_VALUE: 27
PIF_VALUE: 28
PIF_VALUE: 30
PIF_VALUE: 28
PIF_VALUE: 7
PIF_VALUE: 32
PIF_VALUE: 29
PIF_VALUE: 28
PIF_VALUE: 29
PIF_VALUE: 29
PIF_VALUE: 27
PIF_VALUE: 28
PIF_VALUE: 29
PIF_VALUE: 25
PIF_VALUE: 0
PIF_VALUE: 31
PIF_VALUE: 28
PIF_VALUE: 2
PIF_VALUE: 35
PIF_VALUE: 32
PIF_VALUE: 22
PIF_VALUE: 0
PIF_VALUE: 4
PIF_VALUE: 28
PIF_VALUE: 29
PIF_VALUE: 28
PIF_VALUE: 29
PIF_VALUE: 28
PIF_VALUE: 31
PIF_VALUE: 36
PIF_VALUE: 29
PIF_VALUE: 3
PIF_VALUE: 28
PIF_VALUE: 3
PIF_VALUE: 28
PIF_VALUE: 27
PIF_VALUE: 31
PIF_VALUE: 29
PIF_VALUE: 31
PIF_VALUE: 30
PIF_VALUE: 1
PIF_VALUE: 31
PIF_VALUE: 28
PIF_VALUE: 34
PIF_VALUE: 0
PIF_VALUE: 0
PIF_VALUE: 28
PIF_VALUE: 31
PIF_VALUE: 32
PIF_VALUE: 28
PIF_VALUE: 31
PIF_VALUE: 27
PIF_VALUE: 28
PIF_VALUE: 29
PIF_VALUE: 28
PIF_VALUE: 29
PIF_VALUE: 53
PIF_VALUE: 31
PIF_VALUE: 28
PIF_VALUE: 1
PIF_VALUE: 29
PIF_VALUE: 31
PIF_VALUE: 28
PIF_VALUE: 28
PIF_VALUE: 31
PIF_VALUE: 28
PIF_VALUE: 29
PIF_VALUE: 28
PIF_VALUE: 27
PIF_VALUE: 30
PIF_VALUE: 27
PIF_VALUE: 28
PIF_VALUE: 32
PIF_VALUE: 28
PIF_VALUE: 28
PIF_VALUE: 4
PIF_VALUE: 28
PIF_VALUE: 31
PIF_VALUE: 29
PIF_VALUE: 28
PIF_VALUE: 35
PIF_VALUE: 34
PIF_VALUE: 29
PIF_VALUE: 34
PIF_VALUE: 35
PIF_VALUE: 29
PIF_VALUE: 28
PIF_VALUE: 27
PIF_VALUE: 30
PIF_VALUE: 28
PIF_VALUE: 26
PIF_VALUE: 28
PIF_VALUE: 21
PIF_VALUE: 28
PIF_VALUE: 29
PIF_VALUE: 32
PIF_VALUE: 28
PIF_VALUE: 28
PIF_VALUE: 32
PIF_VALUE: 29
PIF_VALUE: 28
PIF_VALUE: 28
PIF_VALUE: 32
PIF_VALUE: 17
PIF_VALUE: 28
PIF_VALUE: 29
PIF_VALUE: 25
PIF_VALUE: 28
PIF_VALUE: 1
PIF_VALUE: 28
PIF_VALUE: 6
PIF_VALUE: 31
PIF_VALUE: 27
PIF_VALUE: 28
PIF_VALUE: 31
PIF_VALUE: 28
PIF_VALUE: 35
PIF_VALUE: 28
PIF_VALUE: 32
PIF_VALUE: 28
PIF_VALUE: 30
PIF_VALUE: 28
PIF_VALUE: 27
PIF_VALUE: 35
PIF_VALUE: 28
PIF_VALUE: 28
PIF_VALUE: 0
PIF_VALUE: 28
PIF_VALUE: 23
PIF_VALUE: 2
PIF_VALUE: 28
PIF_VALUE: 28
PIF_VALUE: 35
PIF_VALUE: 0
PIF_VALUE: 28
PIF_VALUE: 32
PIF_VALUE: 29
PIF_VALUE: 8
PIF_VALUE: 21
PIF_VALUE: 28
PIF_VALUE: 28
PIF_VALUE: 29
PIF_VALUE: 0
PIF_VALUE: 28
PIF_VALUE: 31
PIF_VALUE: 0
PIF_VALUE: 32
PIF_VALUE: 28
PIF_VALUE: 29
PIF_VALUE: 28
PIF_VALUE: 28
PIF_VALUE: 29
PIF_VALUE: 27
PIF_VALUE: 29
PIF_VALUE: 29
PIF_VALUE: 30
PIF_VALUE: 27
PIF_VALUE: 28
PIF_VALUE: 28
PIF_VALUE: 29
PIF_VALUE: 28
PIF_VALUE: 31
PIF_VALUE: 28
PIF_VALUE: 28

## 2020-10-22 ASSESSMENT — PAIN SCALES - GENERAL
PAINLEVEL_OUTOF10: 3
PAINLEVEL_OUTOF10: 5
PAINLEVEL_OUTOF10: 6
PAINLEVEL_OUTOF10: 6
PAINLEVEL_OUTOF10: 4
PAINLEVEL_OUTOF10: 2
PAINLEVEL_OUTOF10: 7
PAINLEVEL_OUTOF10: 3

## 2020-10-22 ASSESSMENT — PAIN DESCRIPTION - ONSET
ONSET: ON-GOING

## 2020-10-22 ASSESSMENT — PAIN DESCRIPTION - FREQUENCY
FREQUENCY: CONTINUOUS

## 2020-10-22 ASSESSMENT — PAIN DESCRIPTION - ORIENTATION
ORIENTATION: RIGHT

## 2020-10-22 ASSESSMENT — PAIN DESCRIPTION - LOCATION
LOCATION: FOOT

## 2020-10-22 ASSESSMENT — PAIN DESCRIPTION - PROGRESSION
CLINICAL_PROGRESSION: NOT CHANGED
CLINICAL_PROGRESSION: GRADUALLY WORSENING
CLINICAL_PROGRESSION: NOT CHANGED

## 2020-10-22 ASSESSMENT — PAIN DESCRIPTION - PAIN TYPE
TYPE: SURGICAL PAIN

## 2020-10-22 ASSESSMENT — PAIN DESCRIPTION - DESCRIPTORS
DESCRIPTORS: ACHING
DESCRIPTORS: ACHING

## 2020-10-22 ASSESSMENT — LIFESTYLE VARIABLES: SMOKING_STATUS: 0

## 2020-10-22 ASSESSMENT — PAIN - FUNCTIONAL ASSESSMENT
PAIN_FUNCTIONAL_ASSESSMENT: 0-10
PAIN_FUNCTIONAL_ASSESSMENT: PREVENTS OR INTERFERES SOME ACTIVE ACTIVITIES AND ADLS

## 2020-10-22 NOTE — PLAN OF CARE
Problem: Skin Integrity:  Goal: Will show no infection signs and symptoms  Description: Will show no infection signs and symptoms  Outcome: Ongoing     Problem: Falls - Risk of:  Goal: Will remain free from falls  Description: Will remain free from falls  Outcome: Ongoing     Problem: Pain:  Goal: Pain level will decrease  Description: Pain level will decrease  Outcome: Ongoing     Problem: Pain:  Goal: Control of acute pain  Description: Control of acute pain  Outcome: Ongoing

## 2020-10-22 NOTE — H&P
Amanda Wan    [id number]    [facility] Same Day Surgery Update H & P  Department of General Surgery   Surgical Service   Pre-operative History and Physical  Last H & P within the last 30 days. DIAGNOSIS:   Osteoarthritis of right ankle or foot [M19.071]  Other acquired deformities of unspecified foot [M21.6X9]    Procedure(s):  RIGHT MULTIPLE METATARSAL OSTEOTOMIES, HALLUX INTERPHALANGEAL JOINT ARTHRODESIS, HAMMER TOE CORRECTION 2,3, POSSIBLY 4,5,     HISTORY OF PRESENT ILLNESS:   Patient is a morbidly obese (Body mass index is 57.39 kg/m².), 52 y.o. male with chronic right foot pain and toe deformity. The symptoms have been recalcitrant to conservative treatment and the patient presents today for the above procedure. Covid 19:  Patient denies fever, chills, cough or known exposure to Covid-19.   Patient reports they have been quarantined at home since Covid-19 test.      Past Medical History:        Diagnosis Date    Anesthesia complication     [facility]  fighting    Diabetes mellitus ([facility] [address]     Hypertension      Past Surgical History:        Procedure Laterality Date    ACHILLES TENDON SURGERY Right 9/3/2020    TENDO-ACHILLES LENGTHENING, RELEASE OF MEDIAL ANKLE TENDONS (MULTIPLE) performed by Lila Watson MD at [facility] Right 9/3/2020    RIGHT PANTALAR  ARTHRODESIS, GASTROCNEMIUS RECESSION; performed by Lila Watson MD at [address] [place] [address]      fusion    FOOT SURGERY Bilateral     UPPP UVULOPALATOPHARYGOPLASTY       Past Social History:  Social History     Socioeconomic History    Marital status:      Spouse name: None    Number of children: None    Years of education: None    Highest education level: None   Occupational History    None   Social Needs    Financial resource strain: None    Food insecurity     Worry: None     Inability: None    Transportation needs     Medical: None     Non-medical: None   Tobacco Use    Smoking

## 2020-10-22 NOTE — ANESTHESIA PRE PROCEDURE
injection 1 mL  1 mL Intradermal Once PRN Eliseo Guidry MD        midazolam (VERSED) 2 MG/2ML injection             fentaNYL (SUBLIMAZE) 100 MCG/2ML injection             ropivacaine (NAROPIN) 0.5% injection                Allergies: Allergies   Allergen Reactions    Rofecoxib Rash     (Vioxx)       Problem List:    Patient Active Problem List   Diagnosis Code    Arthritis of right ankle M19.071       Past Medical History:        Diagnosis Date    Anesthesia complication     wakesup  fighting    Dental crowns present     dental implant    Diabetes mellitus ([facility] [address]     Hypertension        Past Surgical History:        Procedure Laterality Date    ACHILLES TENDON SURGERY Right 9/3/2020    TENDO-ACHILLES LENGTHENING, RELEASE OF MEDIAL ANKLE TENDONS (MULTIPLE) performed by Yoli Bocanegra MD at [facility] Right 9/3/2020    RIGHT PANTALAR  ARTHRODESIS, GASTROCNEMIUS RECESSION; performed by Yoli Bocanegra MD at [address] [place] [address]      fusion    FOOT SURGERY Bilateral     UPPP UVULOPALATOPHARYGOPLASTY         Social History:    Social History     Tobacco Use    Smoking status: Never Smoker    Smokeless tobacco: Never Used   Substance Use Topics    Alcohol use: Yes     Comment: occ                                Counseling given: Not Answered      Vital Signs (Current): There were no vitals filed for this visit.                                            BP Readings from Last 3 Encounters:   10/22/20 (!) 154/73   09/09/20 119/84   09/03/20 (!) 113/56       NPO Status:                                                                                 BMI:   Wt Readings from Last 3 Encounters:   10/22/20 (!) 435 lb (197.3 kg)   09/03/20 (!) 430 lb 9.6 oz (195.3 kg)     There is no height or weight on file to calculate BMI.    CBC: No results found for: WBC, RBC, HGB, HCT, MCV, RDW, PLT    CMP:   Lab Results   Component Value Date     09/08/2020    K 4.3 09/08/2020    CL 99 09/08/2020    CO2 27 09/08/2020    BUN 11 09/08/2020    CREATININE 0.7 09/08/2020    GFRAA >60 09/08/2020    LABGLOM >60 09/08/2020    GLUCOSE 128 09/08/2020    CALCIUM 9.0 09/08/2020       POC Tests:   Recent Labs     10/22/20  3473   POCGLU 118*       Coags:   Lab Results   Component Value Date    PROTIME 10.9 10/22/2020    INR 0.94 10/22/2020    APTT 30.0 10/22/2020       HCG (If Applicable): No results found for: PREGTESTUR, PREGSERUM, HCG, HCGQUANT     ABGs: No results found for: PHART, PO2ART, WYR6BVD, EXM1XDT, BEART, K7KPYWXI     Type & Screen (If Applicable):  No results found for: LABABO, LABRH    Drug/Infectious Status (If Applicable):  No results found for: HIV, HEPCAB    COVID-19 Screening (If Applicable):   Lab Results   Component Value Date    COVID19 NOT DETECTED 08/28/2020         Anesthesia Evaluation  Patient summary reviewed and Nursing notes reviewed no history of anesthetic complications:   Airway: Mallampati: II  TM distance: >3 FB   Neck ROM: full  Mouth opening: > = 3 FB Dental:          Pulmonary:   (+) sleep apnea: on noncompliant,      (-) asthma (has inhalers for when he gets bronchitis) and not a current smoker                           Cardiovascular:  Exercise tolerance: poor (<4 METS),   (+) hypertension:,     (-) past MI and  angina      Rhythm: regular  Rate: normal                    Neuro/Psych:      (-) seizures and CVA           GI/Hepatic/Renal:   (+) GERD:, morbid obesity     (-) liver disease and no renal disease       Endo/Other:    (+) DiabetesType II DM, , .                 Abdominal:           Vascular:                                          Anesthesia Plan      general and regional     ASA 4     (-npo MN  -denies chest pain or palp. Very little walking in some time due to ankle instability. IN wheelchair since April  )  Induction: intravenous. MIPS: Postoperative opioids intended. Anesthetic plan and risks discussed with patient.       Plan discussed with CRNA.                   Hugo Caldera, DO   10/22/2020

## 2020-10-22 NOTE — PROGRESS NOTES
Patient arrived to room 5517 from PACU. Patient A&Ox4. Surgical dressing clean, dry, and intact with exception to small area of drainage over big toe. Patient had 2 blocks, reports numbness, tingling with pain. Pain managed per MAR. Patient oriented to room and instructed to call out for needs. Fall precautions in place. Will continue to monitor.

## 2020-10-22 NOTE — ANESTHESIA PROCEDURE NOTES
Peripheral Block    Patient location during procedure: pre-op  Staffing  Anesthesiologist: Afshan Marti DO  Preanesthetic Checklist  Completed: patient identified, site marked, surgical consent, pre-op evaluation, timeout performed, IV checked, risks and benefits discussed, monitors and equipment checked, anesthesia consent given, oxygen available and patient being monitored  Peripheral Block  Patient position: supine  Prep: ChloraPrep  Patient monitoring: cardiac monitor, continuous pulse ox, continuous capnometry, frequent blood pressure checks and IV access  Block type: Saphenous  Laterality: right  Injection technique: single-shot  Procedures: ultrasound guided  Needle  Needle gauge: 22 G  Needle localization: anatomical landmarks and ultrasound guidance  Assessment  Injection assessment: negative aspiration for heme, no paresthesia on injection and local visualized surrounding nerve on ultrasound  Paresthesia pain: none  Slow fractionated injection: yes  Hemodynamics: stable  Additional Notes  Following sciatic nerve block, adductor canal block completed. 20 mL 0.5% ropivacaine injected in 5 mL increments following negative aspiration. Somewhat difficult placement due to pt body habitus. No pain or paresthesias on injection. Pt tolerated the procedure well.    Reason for block: procedure for pain, post-op pain management and at surgeon's request

## 2020-10-22 NOTE — PROGRESS NOTES
Personally updated patient's wife via telephone on patient's status and inpatient room situation at [phone]. Will update again with room assignment once provided.

## 2020-10-22 NOTE — PROGRESS NOTES
4 Eyes Admission Assessment     I agree as the admission nurse that 2 RN's have performed a thorough Head to Toe Skin Assessment on the patient. ALL assessment sites listed below have been assessed on admission. Areas assessed by both nurses:   [x]   Head, Face, and Ears   [x]   Shoulders, Back, and Chest  [x]   Arms, Elbows, and Hands   [x]   Coccyx, Sacrum, and Ischium  [x]   Legs, Feet, and Heels        Does the Patient have Skin Breakdown?   No         Con Prevention initiated:  No   Wound Care Orders initiated:  No      [facility] nurse consulted for Pressure Injury (Stage 3,4, Unstageable, DTI, NWPT, and Complex wounds) or Con score 18 or lower:  No      Nurse 1 eSignature: Electronically signed by Da Sanford RN on 10/22/20 at 7:45 PM EDT    **SHARE this note so that the co-signing nurse is able to place an eSignature**    Nurse 2 eSignature: Electronically signed by Chrissy Campbell RN on 10/22/20 at 11:58 PM EDT

## 2020-10-22 NOTE — PROGRESS NOTES
Patient admitted to PACU #16 from OR per stretcher at 1510 s/p RIGHT MULTIPLE METATARSAL OSTEOTOMIES, HALLUX INTERPHALANGEAL JOINT ARTHRODESIS, HAMMER TOE CORRECTION 2,3, PLANTAR FASCIA RELEASE (Right). Report received at bedside in PACU per CRNA. Patient was reported to be hemodynamically stable in OR with no complications along with receiving a pre-op nerve block. Patient connected to PACU monitoring equipment. IVF's infusing with site unremarkable. Patient arrived to PACU responsive but not fully wakeful from anesthesia but with respirations easy, even and regular on a NRB with oxygen level at 15 liters. RLE surgical dressing remains clean and intact but still damp from splint with all toes completely covered. RLE elevated on pillow with FOB also elevated. No further changes. Will continue to monitor.

## 2020-10-22 NOTE — PROGRESS NOTES
PACU Transfer Note    Vitals:    10/22/20 1710   BP: (!) 153/94   Pulse: 102   Resp: 17   Temp: 97.6 °F (36.4 °C)   SpO2: 94%       In: 600 [I.V.:600]  Out: 125     Pain assessment:  present - adequately treated  Pain Level: 3    Report given to Receiving unit RN. Patient meets nestor discharge criteria from PACU. Evaristo Mccormick RN receiving patient updated on patient's status (pain and nausea meds given, oxygen at 4 liters, IVF's free flowing and will need to be connected to machine on floor). Patient's wife called and informed that patient is in his room. All belongings including wheelchair taken to inpatient room.       10/22/2020 5:18 PM

## 2020-10-22 NOTE — BRIEF OP NOTE
Brief Postoperative Note      Patient: Keaton Torrez  YOB: 1970  MRN: [id number]    Date of Procedure: 10/22/2020    Pre-Op Diagnosis: Primary Osteoarthritis of right ankle or foot [M19.071] Other acquired deformities of unspecified foot [M21.6X9]    Post-Op Diagnosis: Same       Procedure(s):  RIGHT MULTIPLE METATARSAL OSTEOTOMIES, HALLUX INTERPHALANGEAL JOINT ARTHRODESIS, HAMMER TOE CORRECTION 2,3, PLANTAR FASCIA RELEASE    Surgeon(s):  Arpan Mortensen MD    Assistant:  Surgical Assistant: Adina Lemons Assistant: ANNABELLA Munroe    Anesthesia: General    Estimated Blood Loss (mL): Minimal    Complications: None    Specimens:   * No specimens in log *    Implants:  * No implants in log *      Drains: * No LDAs found *    Findings: See Above.      Electronically signed by Arpan Mortensen MD on 10/22/2020 at 2:17 PM

## 2020-10-22 NOTE — PROGRESS NOTES
Report given to Mehul Lucas RN receiving patient on 5 tower in PACU at 9770 with all information provided along with patient receiving two pre-op nerve blocks.

## 2020-10-23 VITALS
RESPIRATION RATE: 16 BRPM | TEMPERATURE: 97.6 F | BODY MASS INDEX: 41.75 KG/M2 | SYSTOLIC BLOOD PRESSURE: 132 MMHG | WEIGHT: 315 LBS | OXYGEN SATURATION: 97 % | HEIGHT: 73 IN | HEART RATE: 80 BPM | DIASTOLIC BLOOD PRESSURE: 74 MMHG

## 2020-10-23 LAB — MRSA SCREEN RT-PCR: NORMAL

## 2020-10-23 PROCEDURE — 6370000000 HC RX 637 (ALT 250 FOR IP): Performed by: ORTHOPAEDIC SURGERY

## 2020-10-23 PROCEDURE — 2580000003 HC RX 258: Performed by: ORTHOPAEDIC SURGERY

## 2020-10-23 PROCEDURE — G0378 HOSPITAL OBSERVATION PER HR: HCPCS

## 2020-10-23 PROCEDURE — 6360000002 HC RX W HCPCS: Performed by: ORTHOPAEDIC SURGERY

## 2020-10-23 RX ORDER — HYDROCODONE BITARTRATE AND ACETAMINOPHEN 5; 325 MG/1; MG/1
1-2 TABLET ORAL
Qty: 40 TABLET | Refills: 0 | COMMUNITY
Start: 2020-10-23 | End: 2020-10-30

## 2020-10-23 RX ADMIN — METFORMIN HYDROCHLORIDE 500 MG: 500 TABLET, EXTENDED RELEASE ORAL at 08:25

## 2020-10-23 RX ADMIN — DULOXETINE HYDROCHLORIDE 30 MG: 30 CAPSULE, DELAYED RELEASE ORAL at 09:08

## 2020-10-23 RX ADMIN — LISINOPRIL 10 MG: 10 TABLET ORAL at 09:09

## 2020-10-23 RX ADMIN — HYDROCODONE BITARTRATE AND ACETAMINOPHEN 2 TABLET: 5; 325 TABLET ORAL at 09:09

## 2020-10-23 RX ADMIN — HYDROCODONE BITARTRATE AND ACETAMINOPHEN 2 TABLET: 5; 325 TABLET ORAL at 03:51

## 2020-10-23 RX ADMIN — GABAPENTIN 300 MG: 300 CAPSULE ORAL at 09:08

## 2020-10-23 RX ADMIN — RIVAROXABAN 10 MG: 10 TABLET, FILM COATED ORAL at 09:09

## 2020-10-23 RX ADMIN — CEFAZOLIN SODIUM 3 G: 1 POWDER, FOR SOLUTION INTRAMUSCULAR; INTRAVENOUS at 06:18

## 2020-10-23 RX ADMIN — TRIAMTERENE AND HYDROCHLOROTHIAZIDE 1 TABLET: 37.5; 25 TABLET ORAL at 09:09

## 2020-10-23 ASSESSMENT — PAIN DESCRIPTION - LOCATION
LOCATION: FOOT
LOCATION: FOOT

## 2020-10-23 ASSESSMENT — PAIN DESCRIPTION - PAIN TYPE
TYPE: SURGICAL PAIN
TYPE: SURGICAL PAIN

## 2020-10-23 ASSESSMENT — PAIN - FUNCTIONAL ASSESSMENT
PAIN_FUNCTIONAL_ASSESSMENT: PREVENTS OR INTERFERES SOME ACTIVE ACTIVITIES AND ADLS
PAIN_FUNCTIONAL_ASSESSMENT: PREVENTS OR INTERFERES SOME ACTIVE ACTIVITIES AND ADLS

## 2020-10-23 ASSESSMENT — PAIN DESCRIPTION - ONSET
ONSET: ON-GOING
ONSET: ON-GOING

## 2020-10-23 ASSESSMENT — PAIN DESCRIPTION - DESCRIPTORS
DESCRIPTORS: ACHING
DESCRIPTORS: ACHING

## 2020-10-23 ASSESSMENT — PAIN DESCRIPTION - PROGRESSION
CLINICAL_PROGRESSION: NOT CHANGED
CLINICAL_PROGRESSION: NOT CHANGED

## 2020-10-23 ASSESSMENT — PAIN SCALES - GENERAL
PAINLEVEL_OUTOF10: 5
PAINLEVEL_OUTOF10: 7
PAINLEVEL_OUTOF10: 7

## 2020-10-23 ASSESSMENT — PAIN DESCRIPTION - ORIENTATION
ORIENTATION: RIGHT
ORIENTATION: RIGHT

## 2020-10-23 ASSESSMENT — PAIN DESCRIPTION - FREQUENCY: FREQUENCY: CONTINUOUS

## 2020-10-23 NOTE — CARE COORDINATION
Patient discharged today prior to SW rounding at room.      YISEL Kellogg, [place]  Social Work/Case Management  [facility]   [phone]

## 2020-10-23 NOTE — CONSULTS
Clinical Pharmacy Consult Note    Admit date: 10/22/2020     Subjective/Objective:   53 yo M with a PMHx that includes DM, HTN who is admitted with s/p R multiple metatarsal osteotomies, hallux IP joint arthrodesis and hammer toe correction (10/22). Pharmacy has been consulted to make pain medication recommendations by Dr. Tony Meyers. 10/23: Pt reports pain is well controlled with Hydrocodone-APAP; has no complaints. He reports this has worked well after previous surgeries. Has not had BM this admission - does report gas this AM.    Current pain regimen:   Medication  Home med? Amount used last 24 hrs    Duloxetine 30 mg daily Yes 1 dose   Gabapentin 300 mg TID Yes 1 dose   Hydrocodone-APAP 5/325 mg 1-2 tab q4h PRN  25 mg (5 tablets)    No  0.25 mg IV (1 dose)   Morphine 2-4 mg IV q2h PRN  2 mg IV (1 dose)     Morphine Equivalent Daily Dose (MEDD):  Date MEDD   10/22 36 mg         Bowel Regimen:  None ordered     Assessment/Plan:  1)  Pain regimen recommendations:  · Would not recommend changes at this time as pt reports pain is well controlled. Could consider d/c of IV morphine as pt is reporting adequate pain control with PO medications. · Patient does not have bowel regimen ordered - consider addition of Sennosides-docusate 1 tab BID. 2) Patient counseling:  Discussed pain management after surgery -- discussed use & common side effects of pain medications. Reviewed constipation as common side effect and how to avoid/treat. Will continue to monitor and assist with adjustments to regimen as needed. Thank you for the consult! Please call with questions.   Courtney Busby, PharmD, BCPS  Wireless: [phone]  or [phone]  10/23/2020 9:03 AM

## 2020-10-23 NOTE — PLAN OF CARE
Problem: Falls - Risk of:  Goal: Will remain free from falls  Description: Will remain free from falls  10/22/2020 2355 by Senia Cortes RN  Outcome: Ongoing  Note: Fall precautions in place. Bed is in lowest position, wheels locked and alarm on. Non-skid socks on. Call light and bedside table within reach. Pt calls out appropriately. Pt is up x 1-2 assist via stedy. Patient is NWB to right leg. Will continue to assess and monitor. Problem: Pain:  Goal: Pain level will decrease  Description: Pain level will decrease  10/22/2020 2355 by Senia Cortes RN  Outcome: Ongoing  Note: Patient uses 0-10 pain scale. Patient is rating his pain as a 7 out of 10 in his right surgical foot. He describes the pain as an aching pain. Requests oral Percocet PRN with relief. Patient rating his pain as a 2 after medicating. Use of pillows to elevate the RLE for added comfort. Will continue to assess and monitor.

## 2020-10-23 NOTE — PROGRESS NOTES
Pt and wife verbalized understanding of D/C and medication instructions. Condition good and goals met at D/C.

## 2020-10-23 NOTE — TELEPHONE ENCOUNTER
Caller: Ming Swenson    Relationship: Self    Best call back number: [phone]    Medication needed:   Requested Prescriptions     Pending Prescriptions Disp Refills   • rivaroxaban (Xarelto) 10 MG tablet 30 tablet      Sig: Take 1 tablet by mouth Daily.       What details did the patient provide when requesting the medication: PATIENT TOTALLY OUT OF MEDICATION.     Does the patient have less than a 3 day supply:  [x] Yes  [] No    What is the patient's preferred pharmacy: [id number] [address] [phone] [facility] [phone]

## 2020-10-23 NOTE — PROGRESS NOTES
Patient is alert and oriented x 4. Calls out appropriately. ACE wrap and splint is CD&I with no purulent drainage noted. Patient is rating his pain as a 7 at its highest. Medicated per the [facility] with relief. Patient is tolerating PO fluids adequately. He is not complaining of nausea / vomiting. He has voided multiple times via urinal. Vital signs are stable. Will continue to assess and monitor.

## 2020-10-23 NOTE — PROGRESS NOTES
Ortho Progress Note    POD 1 s/p multiple metatarsal osteotomies and forefoot reconstructive surgery. He has no complaints at this time. He feels as if his pain is currently well controlled. He does not feel as if he needs therapy prior to discharge. Dressings with some drainage. Good capillary refill in all digits. Block still partially intact. Continue NWB on operative extremity. D/c to home today as long as pain is under control. Follow up in the office today for cast application.

## 2020-10-24 NOTE — OP NOTE
[facility]               [address]                                OPERATIVE REPORT    PATIENT NAME: Tavia Alanis                       :        1970  MED REC NO:   [id number]                          ROOM:       0584  ACCOUNT NO:   [de-identified]                           ADMIT DATE: 10/22/2020  PROVIDER:     Graciela Love. Riccardo Duke MD    DATE OF PROCEDURE:  10/22/2020    SURGEON:  Carol Summers MD    [facility]    PREOPERATIVE DIAGNOSES:  Right,  1. Metatarsus adductus/clawed forefoot deformity. 2.  Hammertoe, digits two and three. 3.  Hallux interphalangeal joint arthritis. POSTOPERATIVE DIAGNOSES:  Right,  1. Metatarsus adductus/clawed forefoot deformity. 2.  Hammertoe, digits two and three. 3.  Hallux interphalangeal joint arthritis. OPERATIONS:  Right,  1. Multiple metatarsal osteotomies (metatarsal osteotomy one through  five for correction of cavus and adductus). 2.  Hallux interphalangeal joint arthrodesis. 3.  Hammertoe correction, digits two and three. 4.  Plantar fascia release. INDICATIONS:  This is a 63-year-old gentleman with a history of cava  varus deformity. He is status post hindfoot arthrodesis to correct  hindfoot instability and presents for correction of the forefoot and  midfoot. The risks and potential benefits of the procedures were  discussed in length with the patient. He understands these, was given  the opportunity to ask questions. His questions were answered to his  satisfaction. He has given consent to proceed with the above-outlined  procedures. OPERATIVE PROCEDURE:  The patient was brought to the operating room,  placed in the supine position on the operating table. After induction  of general anesthetic, a pneumatic tourniquet was placed around the  patient's right proximal thigh and set to 350 mmHg.   The right leg was  then prepped and draped free in the usual sterile fashion. A second surgeon was necessary throughout the procedure due to the  patient's increased size and body mass index. The patient is morbidly  obese and a second surgeon was necessary to aid with positioning of the  patient, positioning of the extremity during the procedure. A second  surgeon was necessary to aid with operation of the multiplanar  fluoroscopy unit. A second surgeon was necessary to aid with major  deformity correction. A second surgeon was necessary to decrease  overall operative time and to improve the patient's safety and outcome. Plantar fascia release. Physical examination revealed marked metatarsus  adductus and midfoot cavus. The patient's plantar soft tissue  structures were very tense as was the abductor hallucis fascia. For  this reason, the decision was made to proceed with a plantar fascia  release to aid with correction of the midfoot and forefoot deformities. Incision was made along the medial aspect of the longitudinal arch  adjacent to the plantar fascia. Dissection was carried out through the  subcutaneous tissue and the plantar fascia was dissected with a Cartagena  elevator superiorly and inferiorly. Care was taken to identify and  protect any neurologic or vascular structures in the area. The plantar  fascia was sharply released allowing relaxation of the longitudinal arch  to allow some elevation of the midfoot; however, there was still marked  metatarsus adductus due to the contracture of the abductor hallucis  fascia. For this reason, a subperiosteal dissection was performed along  the medial longitudinal arch superiorly. The abductor hallucis fascia  was noted to be markedly contracted. This was sharply released with a  #15 scalpel. The muscle belly at this point was elevated bluntly with a  Hohmann retractor allowing exposure of the deep abductor hallucis fascia  and this was also sharply released.   This dramatically improved the  medial and plantar soft tissue contracture. This wound was irrigated  and closed with 3-0 nylon suture. Multiple metatarsal osteotomies. At this point, an incision was made  along the medial aspect of the first metatarsal.  Dissection was carried  out through the subcutaneous and deeper tissue and a subperiosteal  dissection performed superiorly and inferiorly. A high-speed handheld  oscillating saw was used to create an oblique osteotomy from dorsal  distal to proximal plantar through the first metatarsal diaphysis and  proximal metaphysis. An incision was then made directly over the third  metatarsal and dissection carried out medially and laterally. Care was  taken to identify and protect the small sensory cutaneous nerves and the  medial dorsal cutaneous nerve of the foot and the lateral dorsal  cutaneous nerve of the foot. A subperiosteal dissection was performed  over the dorsal aspect of the second, third, and fourth metatarsals and  a high-speed handheld oscillating saw was used to create an osteotomy  from dorsal distal to proximal plantar through the second, third, and  fourth metatarsal diaphysis and proximal metaphysis. Incision was then  made along the lateral aspect of the fifth metatarsal and dissection  carried out superiorly and inferiorly subperiosteally. A high-speed  handheld oscillating saw was used to create an oblique osteotomy from  dorsal distal to proximal plantar to the fifth metatarsal diaphysis and  metaphysis. This freed the midfoot cavus and adductus contractures  nicely and the patient's foot deformity was able to be corrected at this  point. The wounds were irrigated with a sterile lavage solution. Each  osteotomy site was bone grafted with Synthes demineralized bone matrix  injected at the osteotomy site.   The osteotomy was then placed in the  desired corrected position and pinned with cross K-wires, crossing wires  from the fifth metatarsal to the midfoot and from the first metatarsal  to the midfoot. This afforded excellent correction and stability of the  osteotomies. Radiographs verified positioning of the correction and the  fixation devices. Hallux interphalangeal joint arthrodesis. At this point, attention was  turned to addressing the rigid flexion deformity of the hallux  interphalangeal joint. Incision was made transverse to the axis of the  joint directly over the joint and this was olman'd proximally dorsally  over the hallux proximal phalanx. This allowed good exposure of the  joint. The joint was noted to be rigidly flexed and the joint was noted  to be markedly arthritic. The condylar surfaces of the proximal and  distal phalanges were resected with a high-speed handheld oscillating  saw. The digit was brought to a neutral axial position and pinned with  guidewires for the [employer] 2.6 mm headless compression screws. These were checked under multiplanar fluoroscopy and two screws  measured, predrilled, and used to compress the arthrodesis site. This  afforded excellent correction of the digit and excellent compression of  the osteotomy site. The wounds were irrigated with a sterile lavage  solution and closed in layers. The extensor mechanism reapproximated  with 3-0 Vicryl suture. The subcutaneous tissues reapproximated with  3-0 Vicryl suture. The skin edges reapproximated with interrupted 3-0  nylon. Hammertoe correction, second and third toes. At this point, incision  was made over the second and third proximal interphalangeal joints. Dissection was carried out through the subcutaneous tissue and extensor  mechanism elevated from proximal to distal.  This allowed the condyles  of each digit to be exposed. The distal condyles of the proximal  phalanx and the proximal condyles of the middle phalanx were resected  with a high-speed handheld oscillating saw.   Each digit was then pinned  to a neutral axial position with a guidewire for the [employer]  2.6 mm headless compression screw. These were measured, predrilled, and  a compression screw applied axially along the digit. This afforded  rigid and excellent compression and stability of the interphalangeal  joint arthrodesis and good alignment of the digit. Multiplanar  fluoroscopy was used to obtain final radiographs at this point and the  wounds were closed in layers. The extensor mechanism reapproximated  with 3-0 Vicryl suture. The subcutaneous tissue reapproximated 3-0  Vicryl suture. The skin edges reapproximated with interrupted 3-0  nylon. There were no drains and no complications. The sponge and needle counts  were noted to be correct at the end of the procedure by the nursing  staff. Multiplanar fluoroscopy was utilized throughout the procedure. I personally operated and supervised use of the multiplanar fluoroscopy  throughout the procedure. Following closure and application of  dressings, the patient was placed in a well-padded posterior splint with  Chapin Kelp cotton with the ankle in neutral dorsiflexion. Estimated  blood loss was minimal due to use of a tourniquet.         Gloria Song MD    D: 10/23/2020 15:29:43       T: 10/23/2020 15:39:25     [email or web address]  Job#: [id number]     Doc#: [id number]    CC:

## 2020-10-26 NOTE — DISCHARGE SUMMARY
[place] DISCHARGE SUMMARY         The patient was taken to the operating room on 10/22/2020 where the aforementioned procedure was preformed. The patient was taken to the post operative anesthesia recovery unit in stable condition. The patient was then transferred to the orthopaedic floor for post operative pain management and convalesce       The patient was followed medically in the hospital for the above surgical procedures performed and below medical issues during their hospital stay    Active Problems:    Charcot Paola Tooth muscular atrophy  Resolved Problems:    * No resolved hospital problems. *         (x )The patient was placed on anticoagulation therapy for DVT prophylaxis -- resume home dosage of Xarelto    The patient was discharged in stable condition on 10/23/2020. Please see medical reconciliation for discharge medications. The discharge instructions were explained to the patient and the family. The patient will follow up in the office at his scheduled post-op appointments.

## 2020-11-02 RX ORDER — TRAZODONE HYDROCHLORIDE 50 MG/1
TABLET ORAL
Qty: 30 TABLET | Refills: 0 | Status: SHIPPED | OUTPATIENT
Start: 2020-11-02 | End: 2020-11-04 | Stop reason: SDUPTHER

## 2020-11-04 RX ORDER — TRAZODONE HYDROCHLORIDE 50 MG/1
50 TABLET ORAL
Qty: 90 TABLET | Refills: 0 | Status: SHIPPED | OUTPATIENT
Start: 2020-11-04 | End: 2021-02-13

## 2020-11-20 RX ORDER — RIVAROXABAN 10 MG/1
TABLET, FILM COATED ORAL
Qty: 30 TABLET | Refills: 0 | Status: SHIPPED | OUTPATIENT
Start: 2020-11-20 | End: 2020-12-16

## 2020-12-10 ENCOUNTER — OFFICE VISIT (OUTPATIENT)
Dept: FAMILY MEDICINE CLINIC | Facility: CLINIC | Age: 50
End: 2020-12-10

## 2020-12-10 DIAGNOSIS — R50.81 FEVER IN OTHER DISEASES: Primary | ICD-10-CM

## 2020-12-10 PROCEDURE — U0004 COV-19 TEST NON-CDC HGH THRU: HCPCS | Performed by: FAMILY MEDICINE

## 2020-12-10 PROCEDURE — 99441 PR PHYS/QHP TELEPHONE EVALUATION 5-10 MIN: CPT | Performed by: FAMILY MEDICINE

## 2020-12-10 NOTE — PROGRESS NOTES
Follow Up Office Visit      Patient Name: Ming Swenson  : 1970   MRN: [id number]     Chief Complaint:    Chief Complaint   Patient presents with   • Fever       History of Present Illness: Ming Swenson is a 50 y.o. male who is here today  Via telephone without video to follow up with fever, fatigue, and body aches. Patient reports these started over night. He reports brief nausea as well. Patient says that his fever was 101 f, two times. He said he took ibuprofen and tylenol and feels slightly better. He denies cough, sob, diarrhea. He denies sick contact and reports he has not been out of the house.     Patient's identity was verified with his name and date of birth. Patient was located in [place] for this call. No video was used. The call lasted 5 minutes.     Subjective      Review of Systems:   Review of Systems   Constitutional: Positive for fatigue and fever.   HENT: Negative for congestion and sinus pressure.    Respiratory: Negative for cough and shortness of breath.    Gastrointestinal: Positive for nausea. Negative for diarrhea.       I have reviewed and the following portions of the patient's history were updated as appropriate: past family history, past medical history, past social history, past surgical history and problem list.    Medications:     Current Outpatient Medications:   •  benazepril (LOTENSIN) 10 MG tablet, Take 1 tablet by mouth Daily., Disp: 90 tablet, Rfl: 1  •  DULoxetine (CYMBALTA) 30 MG capsule, Take 1 capsule by mouth Daily., Disp: 30 capsule, Rfl: 1  •  gabapentin (NEURONTIN) 100 MG capsule, Take 1 capsule by mouth 3 (Three) Times a Day., Disp: 90 capsule, Rfl: 1  •  metFORMIN ER (GLUCOPHAGE-XR) 500 MG 24 hr tablet, Take 1 tablet by mouth Daily With Breakfast., Disp: 90 tablet, Rfl: 1  •  ofloxacin (FLOXIN) 0.3 % otic solution, Administer 5 drops into the left ear Daily., Disp: 10 mL, Rfl: 0  •  senna (senna) 8.6 MG tablet, Take 1 tablet by mouth 2 (Two) Times a Day., Disp:  , Rfl:   •  traZODone (DESYREL) 50 MG tablet, Take 1 tablet by mouth every night at bedtime., Disp: 90 tablet, Rfl: 0  •  triamterene-hydrochlorothiazide (MAXZIDE-25) 37.5-25 MG per tablet, Take 1 tablet by mouth Daily., Disp: 90 tablet, Rfl: 1  •  Xarelto 10 MG tablet, TAKE 1 TABLET BY MOUTH EVERY DAY, Disp: 30 tablet, Rfl: 0    Allergies:   Allergies   Allergen Reactions   • Vioxx [Rofecoxib]        Objective     Physical Exam:  Vital Signs: There were no vitals filed for this visit.  There is no height or weight on file to calculate BMI.    PE  Physical exam not able to be performed due to telephone all.      Assessment / Plan      Assessment/Plan:   Diagnoses and all orders for this visit:    1. Fever in other diseases (Primary)  -     COVID-19,[facility] ROUTINE, NP/OP SWAB IN TRANSPORT MEDIA OR ESWAB 72 HR TAT - Swab, Nasopharynx; Future  -     COVID-19,[facility] ROUTINE, NP/OP SWAB IN TRANSPORT MEDIA OR ESWAB 72 HR TAT - Swab, Nasopharynx     Total time for call was 5 minutes. Patient was advised to come by for Covid testing. Patient was tested in parking lot because he is unable to ambulate into the office. Patient was advised to call back with evolving/changing symptoms. He can take ibuprofen and tylenol for discomfort. I recommended rest and hydration. He has pulse ox and will monitor his oxygen saturation. He will go to ED for further help if his oxygen dips below 90. I will call patient with covid results.     Follow Up:   Return if symptoms worsen or fail to improve.    Denny Castellano DO  [facility] [place] [facility] [place]       Please note that portions of this note may have been completed with a voice recognition program. Efforts were made to edit the dictations, but occasionally words are mistranscribed.

## 2020-12-11 LAB — SARS-COV-2 RNA RESP QL NAA+PROBE: NOT DETECTED

## 2020-12-16 RX ORDER — RIVAROXABAN 10 MG/1
TABLET, FILM COATED ORAL
Qty: 30 TABLET | Refills: 0 | Status: SHIPPED | OUTPATIENT
Start: 2020-12-16 | End: 2021-01-22 | Stop reason: SDUPTHER

## 2020-12-23 DIAGNOSIS — I10 ESSENTIAL HYPERTENSION: ICD-10-CM

## 2020-12-23 RX ORDER — TRIAMTERENE AND HYDROCHLOROTHIAZIDE 37.5; 25 MG/1; MG/1
1 TABLET ORAL DAILY
Qty: 90 TABLET | Refills: 0 | Status: SHIPPED | OUTPATIENT
Start: 2020-12-23 | End: 2021-07-29 | Stop reason: SDUPTHER

## 2021-01-25 NOTE — TELEPHONE ENCOUNTER
PT CALLED REQUESTING A 90 DAY SUPPLY OF:   rivaroxaban (Xarelto) 10 MG tablet       PT STATED INSURANCE WILL ON COVER A 90 DAY SUPPLY    [facility]/pharmacy #[id number] - [place], [place] - [address] [phone] [facility] [phone] FX

## 2021-02-09 ENCOUNTER — HOSPITAL ENCOUNTER (INPATIENT)
Age: 51
LOS: 3 days | Discharge: HOME HEALTH | DRG: 629 | End: 2021-02-12
Attending: INTERNAL MEDICINE | Admitting: INTERNAL MEDICINE
Payer: COMMERCIAL

## 2021-02-09 ENCOUNTER — ANESTHESIA EVENT (OUTPATIENT)
Dept: OPERATING ROOM | Age: 51
DRG: 629 | End: 2021-02-09
Payer: COMMERCIAL

## 2021-02-09 DIAGNOSIS — L03.115 CELLULITIS OF RIGHT LOWER LIMB: ICD-10-CM

## 2021-02-09 DIAGNOSIS — L03.119 CELLULITIS OF FOOT: Primary | ICD-10-CM

## 2021-02-09 LAB
ANION GAP SERPL CALCULATED.3IONS-SCNC: 10 MMOL/L (ref 3–16)
BUN BLDV-MCNC: 16 MG/DL (ref 7–20)
CALCIUM SERPL-MCNC: 9.1 MG/DL (ref 8.3–10.6)
CHLORIDE BLD-SCNC: 100 MMOL/L (ref 99–110)
CO2: 24 MMOL/L (ref 21–32)
CREAT SERPL-MCNC: 0.6 MG/DL (ref 0.9–1.3)
GFR AFRICAN AMERICAN: >60
GFR NON-AFRICAN AMERICAN: >60
GLUCOSE BLD-MCNC: 301 MG/DL (ref 70–99)
GLUCOSE BLD-MCNC: 325 MG/DL (ref 70–99)
HCT VFR BLD CALC: 42.2 % (ref 40.5–52.5)
HEMOGLOBIN: 14 G/DL (ref 13.5–17.5)
MCH RBC QN AUTO: 28.1 PG (ref 26–34)
MCHC RBC AUTO-ENTMCNC: 33.1 G/DL (ref 31–36)
MCV RBC AUTO: 85.1 FL (ref 80–100)
PDW BLD-RTO: 15.7 % (ref 12.4–15.4)
PERFORMED ON: ABNORMAL
PLATELET # BLD: 199 K/UL (ref 135–450)
PMV BLD AUTO: 10.1 FL (ref 5–10.5)
POTASSIUM REFLEX MAGNESIUM: 4.1 MMOL/L (ref 3.5–5.1)
RBC # BLD: 4.96 M/UL (ref 4.2–5.9)
SODIUM BLD-SCNC: 134 MMOL/L (ref 136–145)
WBC # BLD: 6.7 K/UL (ref 4–11)

## 2021-02-09 PROCEDURE — 85027 COMPLETE CBC AUTOMATED: CPT

## 2021-02-09 PROCEDURE — 2580000003 HC RX 258: Performed by: INTERNAL MEDICINE

## 2021-02-09 PROCEDURE — 6370000000 HC RX 637 (ALT 250 FOR IP): Performed by: INTERNAL MEDICINE

## 2021-02-09 PROCEDURE — 80048 BASIC METABOLIC PNL TOTAL CA: CPT

## 2021-02-09 PROCEDURE — 83036 HEMOGLOBIN GLYCOSYLATED A1C: CPT

## 2021-02-09 PROCEDURE — 36415 COLL VENOUS BLD VENIPUNCTURE: CPT

## 2021-02-09 PROCEDURE — 1200000000 HC SEMI PRIVATE

## 2021-02-09 RX ORDER — DULOXETIN HYDROCHLORIDE 30 MG/1
30 CAPSULE, DELAYED RELEASE ORAL DAILY
Status: DISCONTINUED | OUTPATIENT
Start: 2021-02-09 | End: 2021-02-11

## 2021-02-09 RX ORDER — PROMETHAZINE HYDROCHLORIDE 25 MG/1
12.5 TABLET ORAL EVERY 6 HOURS PRN
Status: DISCONTINUED | OUTPATIENT
Start: 2021-02-09 | End: 2021-02-12 | Stop reason: HOSPADM

## 2021-02-09 RX ORDER — ACETAMINOPHEN 325 MG/1
650 TABLET ORAL EVERY 6 HOURS PRN
Status: DISCONTINUED | OUTPATIENT
Start: 2021-02-09 | End: 2021-02-12 | Stop reason: HOSPADM

## 2021-02-09 RX ORDER — DEXTROSE MONOHYDRATE 25 G/50ML
12.5 INJECTION, SOLUTION INTRAVENOUS PRN
Status: DISCONTINUED | OUTPATIENT
Start: 2021-02-09 | End: 2021-02-12 | Stop reason: HOSPADM

## 2021-02-09 RX ORDER — CALCIUM CARBONATE 200(500)MG
500 TABLET,CHEWABLE ORAL 3 TIMES DAILY PRN
Status: DISCONTINUED | OUTPATIENT
Start: 2021-02-09 | End: 2021-02-12 | Stop reason: HOSPADM

## 2021-02-09 RX ORDER — TRAZODONE HYDROCHLORIDE 50 MG/1
50 TABLET ORAL NIGHTLY PRN
COMMUNITY

## 2021-02-09 RX ORDER — INSULIN LISPRO 100 [IU]/ML
0-12 INJECTION, SOLUTION INTRAVENOUS; SUBCUTANEOUS
Status: DISCONTINUED | OUTPATIENT
Start: 2021-02-10 | End: 2021-02-11

## 2021-02-09 RX ORDER — SODIUM CHLORIDE 0.9 % (FLUSH) 0.9 %
10 SYRINGE (ML) INJECTION EVERY 12 HOURS SCHEDULED
Status: DISCONTINUED | OUTPATIENT
Start: 2021-02-09 | End: 2021-02-12 | Stop reason: HOSPADM

## 2021-02-09 RX ORDER — METFORMIN HYDROCHLORIDE 500 MG/1
500 TABLET, EXTENDED RELEASE ORAL
Status: DISCONTINUED | OUTPATIENT
Start: 2021-02-10 | End: 2021-02-10

## 2021-02-09 RX ORDER — SODIUM CHLORIDE 0.9 % (FLUSH) 0.9 %
10 SYRINGE (ML) INJECTION PRN
Status: DISCONTINUED | OUTPATIENT
Start: 2021-02-09 | End: 2021-02-12 | Stop reason: HOSPADM

## 2021-02-09 RX ORDER — ONDANSETRON 2 MG/ML
4 INJECTION INTRAMUSCULAR; INTRAVENOUS EVERY 6 HOURS PRN
Status: DISCONTINUED | OUTPATIENT
Start: 2021-02-09 | End: 2021-02-12 | Stop reason: HOSPADM

## 2021-02-09 RX ORDER — NICOTINE POLACRILEX 4 MG
15 LOZENGE BUCCAL PRN
Status: DISCONTINUED | OUTPATIENT
Start: 2021-02-09 | End: 2021-02-12 | Stop reason: HOSPADM

## 2021-02-09 RX ORDER — TRAZODONE HYDROCHLORIDE 50 MG/1
50 TABLET ORAL NIGHTLY PRN
Status: DISCONTINUED | OUTPATIENT
Start: 2021-02-09 | End: 2021-02-12 | Stop reason: HOSPADM

## 2021-02-09 RX ORDER — INSULIN LISPRO 100 [IU]/ML
0-6 INJECTION, SOLUTION INTRAVENOUS; SUBCUTANEOUS NIGHTLY
Status: DISCONTINUED | OUTPATIENT
Start: 2021-02-09 | End: 2021-02-11

## 2021-02-09 RX ORDER — POLYETHYLENE GLYCOL 3350 17 G/17G
17 POWDER, FOR SOLUTION ORAL DAILY PRN
Status: DISCONTINUED | OUTPATIENT
Start: 2021-02-09 | End: 2021-02-12 | Stop reason: HOSPADM

## 2021-02-09 RX ORDER — LISINOPRIL 5 MG/1
5 TABLET ORAL DAILY
Status: DISCONTINUED | OUTPATIENT
Start: 2021-02-09 | End: 2021-02-12 | Stop reason: HOSPADM

## 2021-02-09 RX ORDER — ACETAMINOPHEN 650 MG/1
650 SUPPOSITORY RECTAL EVERY 6 HOURS PRN
Status: DISCONTINUED | OUTPATIENT
Start: 2021-02-09 | End: 2021-02-12 | Stop reason: HOSPADM

## 2021-02-09 RX ORDER — DEXTROSE MONOHYDRATE 50 MG/ML
100 INJECTION, SOLUTION INTRAVENOUS PRN
Status: DISCONTINUED | OUTPATIENT
Start: 2021-02-09 | End: 2021-02-12 | Stop reason: HOSPADM

## 2021-02-09 RX ADMIN — TRAZODONE HYDROCHLORIDE 50 MG: 50 TABLET ORAL at 23:33

## 2021-02-09 RX ADMIN — Medication 10 ML: at 22:06

## 2021-02-09 RX ADMIN — INSULIN LISPRO 4 UNITS: 100 INJECTION, SOLUTION INTRAVENOUS; SUBCUTANEOUS at 23:33

## 2021-02-09 RX ADMIN — ANTACID TABLETS 500 MG: 500 TABLET, CHEWABLE ORAL at 23:40

## 2021-02-09 ASSESSMENT — PAIN SCALES - GENERAL: PAINLEVEL_OUTOF10: 0

## 2021-02-09 NOTE — H&P
Hospital Medicine History & Physical      PCP: Adam Wright    Date of Admission: 2/9/2021    Date of Service: Pt seen/examined on 2/9/21 and Admitted to Inpatient     Chief Complaint: Right foot infection      History Of Present Illness: The patient is a 52 y.o. male with history of diabetes mellitus type 2, hypertension, morbid obesity who presents to [facility] ([facility]) from his orthopedic office for right foot infection. Patient had multiple surgeries on legs and feet last year. He follows up with orthopedic. For the last 1 week he has noticed wound over his right foot. He was advised to come to the hospital today for potential surgery tomorrow. Patient is denying any shortness of breath no chest pain no bowel and bladder issues    Past Medical History:        Diagnosis Date    Anesthesia complication     wakesup  fighting    Dental crowns present     dental implant    Diabetes mellitus ([facility] [address]     Hypertension        Past Surgical History:        Procedure Laterality Date    ACHILLES TENDON SURGERY Right 9/3/2020    TENDO-ACHILLES LENGTHENING, RELEASE OF MEDIAL ANKLE TENDONS (MULTIPLE) performed by Terence Johns MD at [facility] Right 9/3/2020    RIGHT PANTALAR  ARTHRODESIS, GASTROCNEMIUS RECESSION; performed by Terence Johns MD at [address] [place] [address]      fusion    FOOT SURGERY Bilateral     HAMMER TOE SURGERY Right 10/22/2020    RIGHT MULTIPLE METATARSAL OSTEOTOMIES, HALLUX INTERPHALANGEAL JOINT ARTHRODESIS, HAMMER TOE CORRECTION 2,3, PLANTAR FASCIA RELEASE performed by Terence Johns MD at [address] [place] [address] UVULOPALATOPHARYGOPLASTY         Medications Prior to Admission:    Prior to Admission medications    Medication Sig Start Date End Date Taking?  Authorizing Provider rivaroxaban (XARELTO) 10 MG TABS tablet Take 1 tablet by mouth daily 9/9/20   ANNABELLA Clayton   GABAPENTIN PO Take by mouth    Historical Provider, MD   benazepril (LOTENSIN) 10 MG tablet Take 10 mg by mouth daily 8/14/20   Historical Provider, MD   metFORMIN (GLUCOPHAGE-XR) 500 MG extended release tablet Take 500 mg by mouth daily (with breakfast) 4/23/20   Historical Provider, MD   triamterene-hydroCHLOROthiazide (MAXZIDE-25) 37.5-25 MG per tablet Take 1 tablet by mouth daily 4/23/20   Historical Provider, MD   DULoxetine (CYMBALTA) 30 MG extended release capsule Take 30 mg by mouth daily    Historical Provider, MD       Allergies:  Rofecoxib    Social History:  The patient currently lives at home    TOBACCO:   reports that he has never smoked. He has never used smokeless tobacco.  ETOH:   reports current alcohol use. Family History:  Reviewed in detail and negative for DM, Early CAD, Cancer, CVA. Positive as follows:    No family history on file. REVIEW OF SYSTEMS:   Positive and negative as noted in the HPI. All other systems reviewed and negative. PHYSICAL EXAM:    There were no vitals taken for this visit. General appearance: No apparent distress appears stated age and cooperative. Morbid obesity  HEENT Normal cephalic, atraumatic without obvious deformity. Pupils equal, round, and reactive to light. Extra ocular muscles intact. Conjunctivae/corneas clear. Neck: Supple, No jugular venous distention/bruits. Trachea midline without thyromegaly or adenopathy with full range of motion. Lungs: Clear to auscultation, bilaterally without Rales/Wheezes/Rhonchi with good respiratory effort. Heart: Regular rate and rhythm with Normal S1/S2 without murmurs, rubs or gallops, point of maximum impulse non-displaced  Abdomen: Soft, non-tender or non-distended without rigidity or guarding and positive bowel sounds all four quadrants. Extremities:right leg in dressing, open wound over right lateral foot, no edema on left leg  Skin: Skin color, texture, turgor normal.  No rashes or lesions. Neurologic: Alert and oriented X 3, neurovascularly intact with sensory/motor intact upper extremities/lower extremities, bilaterally. Cranial nerves: II-XII intact, grossly non-focal.  Mental status: Alert, oriented, thought content appropriate. Capillary Refill: Acceptable  < 3 seconds  Peripheral Pulses: +3 Easily felt, not easily obliterated with pressure      CXR:  I have reviewed the CXR with the following interpretation: pending   EKG:  I have reviewed the EKG with the following interpretation: pending     CBC   No results for input(s): WBC, HGB, HCT, PLT in the last 72 hours. RENAL  No results for input(s): NA, K, CL, CO2, PHOS, BUN, CREATININE in the last 72 hours. Invalid input(s): CA  LFT'S  No results for input(s): AST, ALT, ALB, BILIDIR, BILITOT, ALKPHOS in the last 72 hours. COAG  No results for input(s): INR in the last 72 hours. CARDIAC ENZYMES  No results for input(s): CKTOTAL, CKMB, CKMBINDEX, TROPONINI in the last 72 hours.     U/A:  No results found for: NITRITE, COLORU, WBCUA, RBCUA, MUCUS, BACTERIA, CLARITYU, SPECGRAV, LEUKOCYTESUR, BLOODU, GLUCOSEU, AMORPHOUS    ABG  No results found for: RAU8HJI, BEART, T3EJFABH, PHART, THGBART, BTO3MXN, PO2ART, ADA7INK        Active Hospital Problems    Diagnosis Date Noted    Cellulitis of foot [L03.119] 02/09/2021         PHYSICIANS CERTIFICATION:    I certify that Nicholas Smith is expected to be hospitalized for more  than 2 midnights based on the following assessment and plan:      ASSESSMENT/PLAN:  Right foot infection ortho  consulted  Planning for surgery tomorrow, \    Diabetes mellitus type 2 last A1c was 6.8  Labs pending today  Sliding scale insulin  Accu-Cheks  Carb controlled diet  N.p.o. after midnight    History of high blood pressure  Continue home blood pressure medication Hold diuretics, ACE inhibitor dose and getting surgery tomorrow    Morbid obesity, complicating medical management    DVT Prophylaxis:  lovenox  Diet: Diet NPO, After Midnight  Diet NPO, After Midnight  DIET CARB CONTROL;  Code Status: Full Code  PT/OT Eval Status: n/a     Dispo - inpatient        Elan Silver MD    Thank you DILIP Callaway for the opportunity to be involved in this patient's care. If you have any questions or concerns please feel free to contact me at [phone].

## 2021-02-09 NOTE — PROGRESS NOTES
Pt admitted into room 3313. Pt oriented to room and call light system. All questions answered. 4 eyes assessment completed. Head to toe completed can view via flowsheets. VSS.  air. Provider made aware Pt has been admitted and transferred into room. Safety maintained.     Electronically signed by Fernando Yañez RN on 2/9/2021 at 4:35 PM

## 2021-02-10 ENCOUNTER — ANESTHESIA (OUTPATIENT)
Dept: OPERATING ROOM | Age: 51
DRG: 629 | End: 2021-02-10
Payer: COMMERCIAL

## 2021-02-10 VITALS
RESPIRATION RATE: 14 BRPM | SYSTOLIC BLOOD PRESSURE: 107 MMHG | OXYGEN SATURATION: 92 % | TEMPERATURE: 94.6 F | DIASTOLIC BLOOD PRESSURE: 59 MMHG

## 2021-02-10 LAB
ESTIMATED AVERAGE GLUCOSE: 237.4 MG/DL
GLUCOSE BLD-MCNC: 210 MG/DL (ref 70–99)
GLUCOSE BLD-MCNC: 213 MG/DL (ref 70–99)
GLUCOSE BLD-MCNC: 216 MG/DL (ref 70–99)
GLUCOSE BLD-MCNC: 253 MG/DL (ref 70–99)
GLUCOSE BLD-MCNC: 281 MG/DL (ref 70–99)
GLUCOSE BLD-MCNC: 311 MG/DL (ref 70–99)
HBA1C MFR BLD: 9.9 %
PERFORMED ON: ABNORMAL

## 2021-02-10 PROCEDURE — 3700000001 HC ADD 15 MINUTES (ANESTHESIA): Performed by: ORTHOPAEDIC SURGERY

## 2021-02-10 PROCEDURE — 6370000000 HC RX 637 (ALT 250 FOR IP): Performed by: ORTHOPAEDIC SURGERY

## 2021-02-10 PROCEDURE — 87070 CULTURE OTHR SPECIMN AEROBIC: CPT

## 2021-02-10 PROCEDURE — 2580000003 HC RX 258: Performed by: ORTHOPAEDIC SURGERY

## 2021-02-10 PROCEDURE — 7100000000 HC PACU RECOVERY - FIRST 15 MIN: Performed by: ORTHOPAEDIC SURGERY

## 2021-02-10 PROCEDURE — 1200000000 HC SEMI PRIVATE

## 2021-02-10 PROCEDURE — 2500000003 HC RX 250 WO HCPCS: Performed by: NURSE ANESTHETIST, CERTIFIED REGISTERED

## 2021-02-10 PROCEDURE — 87102 FUNGUS ISOLATION CULTURE: CPT

## 2021-02-10 PROCEDURE — 2580000003 HC RX 258: Performed by: NURSE ANESTHETIST, CERTIFIED REGISTERED

## 2021-02-10 PROCEDURE — 87116 MYCOBACTERIA CULTURE: CPT

## 2021-02-10 PROCEDURE — 87015 SPECIMEN INFECT AGNT CONCNTJ: CPT

## 2021-02-10 PROCEDURE — 87077 CULTURE AEROBIC IDENTIFY: CPT

## 2021-02-10 PROCEDURE — 87206 SMEAR FLUORESCENT/ACID STAI: CPT

## 2021-02-10 PROCEDURE — 94150 VITAL CAPACITY TEST: CPT

## 2021-02-10 PROCEDURE — 3600000014 HC SURGERY LEVEL 4 ADDTL 15MIN: Performed by: ORTHOPAEDIC SURGERY

## 2021-02-10 PROCEDURE — 6360000002 HC RX W HCPCS: Performed by: ANESTHESIOLOGY

## 2021-02-10 PROCEDURE — 0QBN0ZZ EXCISION OF RIGHT METATARSAL, OPEN APPROACH: ICD-10-PCS | Performed by: ORTHOPAEDIC SURGERY

## 2021-02-10 PROCEDURE — 87205 SMEAR GRAM STAIN: CPT

## 2021-02-10 PROCEDURE — 6360000002 HC RX W HCPCS: Performed by: ORTHOPAEDIC SURGERY

## 2021-02-10 PROCEDURE — 2709999900 HC NON-CHARGEABLE SUPPLY: Performed by: ORTHOPAEDIC SURGERY

## 2021-02-10 PROCEDURE — 3700000000 HC ANESTHESIA ATTENDED CARE: Performed by: ORTHOPAEDIC SURGERY

## 2021-02-10 PROCEDURE — 7100000001 HC PACU RECOVERY - ADDTL 15 MIN: Performed by: ORTHOPAEDIC SURGERY

## 2021-02-10 PROCEDURE — 6370000000 HC RX 637 (ALT 250 FOR IP): Performed by: INTERNAL MEDICINE

## 2021-02-10 PROCEDURE — 6360000002 HC RX W HCPCS: Performed by: NURSE ANESTHETIST, CERTIFIED REGISTERED

## 2021-02-10 PROCEDURE — 87186 SC STD MICRODIL/AGAR DIL: CPT

## 2021-02-10 PROCEDURE — 3600000004 HC SURGERY LEVEL 4 BASE: Performed by: ORTHOPAEDIC SURGERY

## 2021-02-10 RX ORDER — DIPHENHYDRAMINE HYDROCHLORIDE 50 MG/ML
12.5 INJECTION INTRAMUSCULAR; INTRAVENOUS
Status: DISCONTINUED | OUTPATIENT
Start: 2021-02-10 | End: 2021-02-10 | Stop reason: HOSPADM

## 2021-02-10 RX ORDER — OXYCODONE HYDROCHLORIDE 5 MG/1
10 TABLET ORAL PRN
Status: DISCONTINUED | OUTPATIENT
Start: 2021-02-10 | End: 2021-02-10 | Stop reason: HOSPADM

## 2021-02-10 RX ORDER — SODIUM CHLORIDE 9 MG/ML
INJECTION, SOLUTION INTRAVENOUS CONTINUOUS
Status: DISCONTINUED | OUTPATIENT
Start: 2021-02-10 | End: 2021-02-12 | Stop reason: HOSPADM

## 2021-02-10 RX ORDER — CEFAZOLIN SODIUM 2 G/50ML
2000 SOLUTION INTRAVENOUS
Status: DISCONTINUED | OUTPATIENT
Start: 2021-02-10 | End: 2021-02-10

## 2021-02-10 RX ORDER — METOCLOPRAMIDE HYDROCHLORIDE 5 MG/ML
10 INJECTION INTRAMUSCULAR; INTRAVENOUS
Status: COMPLETED | OUTPATIENT
Start: 2021-02-10 | End: 2021-02-10

## 2021-02-10 RX ORDER — MIDAZOLAM HYDROCHLORIDE 1 MG/ML
INJECTION INTRAMUSCULAR; INTRAVENOUS PRN
Status: DISCONTINUED | OUTPATIENT
Start: 2021-02-10 | End: 2021-02-10 | Stop reason: SDUPTHER

## 2021-02-10 RX ORDER — HYDROCODONE BITARTRATE AND ACETAMINOPHEN 5; 325 MG/1; MG/1
2 TABLET ORAL EVERY 4 HOURS PRN
Status: DISCONTINUED | OUTPATIENT
Start: 2021-02-10 | End: 2021-02-12 | Stop reason: HOSPADM

## 2021-02-10 RX ORDER — SODIUM CHLORIDE, SODIUM LACTATE, POTASSIUM CHLORIDE, CALCIUM CHLORIDE 600; 310; 30; 20 MG/100ML; MG/100ML; MG/100ML; MG/100ML
INJECTION, SOLUTION INTRAVENOUS CONTINUOUS PRN
Status: DISCONTINUED | OUTPATIENT
Start: 2021-02-10 | End: 2021-02-10 | Stop reason: SDUPTHER

## 2021-02-10 RX ORDER — TRIAMTERENE AND HYDROCHLOROTHIAZIDE 37.5; 25 MG/1; MG/1
1 TABLET ORAL DAILY
Status: DISCONTINUED | OUTPATIENT
Start: 2021-02-10 | End: 2021-02-12 | Stop reason: HOSPADM

## 2021-02-10 RX ORDER — CEFAZOLIN SODIUM 1 G/3ML
INJECTION, POWDER, FOR SOLUTION INTRAMUSCULAR; INTRAVENOUS PRN
Status: DISCONTINUED | OUTPATIENT
Start: 2021-02-10 | End: 2021-02-10 | Stop reason: SDUPTHER

## 2021-02-10 RX ORDER — PROPOFOL 10 MG/ML
INJECTION, EMULSION INTRAVENOUS PRN
Status: DISCONTINUED | OUTPATIENT
Start: 2021-02-10 | End: 2021-02-10 | Stop reason: SDUPTHER

## 2021-02-10 RX ORDER — SODIUM CHLORIDE 0.9 % (FLUSH) 0.9 %
10 SYRINGE (ML) INJECTION EVERY 12 HOURS SCHEDULED
Status: DISCONTINUED | OUTPATIENT
Start: 2021-02-10 | End: 2021-02-12 | Stop reason: HOSPADM

## 2021-02-10 RX ORDER — METFORMIN HYDROCHLORIDE 500 MG/1
500 TABLET, EXTENDED RELEASE ORAL
Status: DISCONTINUED | OUTPATIENT
Start: 2021-02-11 | End: 2021-02-11

## 2021-02-10 RX ORDER — ONDANSETRON 2 MG/ML
4 INJECTION INTRAMUSCULAR; INTRAVENOUS EVERY 6 HOURS PRN
Status: DISCONTINUED | OUTPATIENT
Start: 2021-02-10 | End: 2021-02-10 | Stop reason: SDUPTHER

## 2021-02-10 RX ORDER — LIDOCAINE HYDROCHLORIDE 20 MG/ML
INJECTION, SOLUTION INTRAVENOUS PRN
Status: DISCONTINUED | OUTPATIENT
Start: 2021-02-10 | End: 2021-02-10 | Stop reason: SDUPTHER

## 2021-02-10 RX ORDER — SODIUM CHLORIDE 0.9 % (FLUSH) 0.9 %
10 SYRINGE (ML) INJECTION PRN
Status: DISCONTINUED | OUTPATIENT
Start: 2021-02-10 | End: 2021-02-12 | Stop reason: HOSPADM

## 2021-02-10 RX ORDER — MORPHINE SULFATE 2 MG/ML
4 INJECTION, SOLUTION INTRAMUSCULAR; INTRAVENOUS
Status: ACTIVE | OUTPATIENT
Start: 2021-02-10 | End: 2021-02-12

## 2021-02-10 RX ORDER — MAGNESIUM HYDROXIDE 1200 MG/15ML
LIQUID ORAL CONTINUOUS PRN
Status: COMPLETED | OUTPATIENT
Start: 2021-02-10 | End: 2021-02-10

## 2021-02-10 RX ORDER — TRAZODONE HYDROCHLORIDE 50 MG/1
50 TABLET ORAL NIGHTLY PRN
Status: DISCONTINUED | OUTPATIENT
Start: 2021-02-10 | End: 2021-02-10 | Stop reason: SDUPTHER

## 2021-02-10 RX ORDER — MORPHINE SULFATE 4 MG/ML
1 INJECTION, SOLUTION INTRAMUSCULAR; INTRAVENOUS EVERY 5 MIN PRN
Status: DISCONTINUED | OUTPATIENT
Start: 2021-02-10 | End: 2021-02-10 | Stop reason: HOSPADM

## 2021-02-10 RX ORDER — PROMETHAZINE HYDROCHLORIDE 25 MG/ML
6.25 INJECTION, SOLUTION INTRAMUSCULAR; INTRAVENOUS
Status: DISCONTINUED | OUTPATIENT
Start: 2021-02-10 | End: 2021-02-10 | Stop reason: HOSPADM

## 2021-02-10 RX ORDER — HYDRALAZINE HYDROCHLORIDE 20 MG/ML
5 INJECTION INTRAMUSCULAR; INTRAVENOUS EVERY 10 MIN PRN
Status: DISCONTINUED | OUTPATIENT
Start: 2021-02-10 | End: 2021-02-10 | Stop reason: HOSPADM

## 2021-02-10 RX ORDER — MEPERIDINE HYDROCHLORIDE 25 MG/ML
12.5 INJECTION INTRAMUSCULAR; INTRAVENOUS; SUBCUTANEOUS EVERY 5 MIN PRN
Status: DISCONTINUED | OUTPATIENT
Start: 2021-02-10 | End: 2021-02-10 | Stop reason: HOSPADM

## 2021-02-10 RX ORDER — FENTANYL CITRATE 50 UG/ML
INJECTION, SOLUTION INTRAMUSCULAR; INTRAVENOUS PRN
Status: DISCONTINUED | OUTPATIENT
Start: 2021-02-10 | End: 2021-02-10 | Stop reason: SDUPTHER

## 2021-02-10 RX ORDER — MORPHINE SULFATE 2 MG/ML
2 INJECTION, SOLUTION INTRAMUSCULAR; INTRAVENOUS
Status: ACTIVE | OUTPATIENT
Start: 2021-02-10 | End: 2021-02-12

## 2021-02-10 RX ORDER — ROCURONIUM BROMIDE 10 MG/ML
INJECTION, SOLUTION INTRAVENOUS PRN
Status: DISCONTINUED | OUTPATIENT
Start: 2021-02-10 | End: 2021-02-10 | Stop reason: SDUPTHER

## 2021-02-10 RX ORDER — LABETALOL HYDROCHLORIDE 5 MG/ML
5 INJECTION, SOLUTION INTRAVENOUS EVERY 10 MIN PRN
Status: DISCONTINUED | OUTPATIENT
Start: 2021-02-10 | End: 2021-02-10 | Stop reason: HOSPADM

## 2021-02-10 RX ORDER — ONDANSETRON 2 MG/ML
INJECTION INTRAMUSCULAR; INTRAVENOUS PRN
Status: DISCONTINUED | OUTPATIENT
Start: 2021-02-10 | End: 2021-02-10 | Stop reason: SDUPTHER

## 2021-02-10 RX ORDER — PROMETHAZINE HYDROCHLORIDE 25 MG/1
12.5 TABLET ORAL EVERY 6 HOURS PRN
Status: DISCONTINUED | OUTPATIENT
Start: 2021-02-10 | End: 2021-02-10 | Stop reason: SDUPTHER

## 2021-02-10 RX ORDER — HYDROCODONE BITARTRATE AND ACETAMINOPHEN 5; 325 MG/1; MG/1
1 TABLET ORAL EVERY 4 HOURS PRN
Status: DISCONTINUED | OUTPATIENT
Start: 2021-02-10 | End: 2021-02-12 | Stop reason: HOSPADM

## 2021-02-10 RX ORDER — OXYCODONE HYDROCHLORIDE 5 MG/1
5 TABLET ORAL PRN
Status: DISCONTINUED | OUTPATIENT
Start: 2021-02-10 | End: 2021-02-10 | Stop reason: HOSPADM

## 2021-02-10 RX ADMIN — MIDAZOLAM HYDROCHLORIDE 2 MG: 2 INJECTION, SOLUTION INTRAMUSCULAR; INTRAVENOUS at 11:17

## 2021-02-10 RX ADMIN — SUGAMMADEX 100 MG: 100 INJECTION, SOLUTION INTRAVENOUS at 12:07

## 2021-02-10 RX ADMIN — CEFAZOLIN 3000 MG: 10 INJECTION, POWDER, FOR SOLUTION INTRAVENOUS at 20:55

## 2021-02-10 RX ADMIN — METOCLOPRAMIDE HYDROCHLORIDE 10 MG: 5 INJECTION INTRAMUSCULAR; INTRAVENOUS at 12:59

## 2021-02-10 RX ADMIN — SODIUM CHLORIDE: 9 INJECTION, SOLUTION INTRAVENOUS at 13:08

## 2021-02-10 RX ADMIN — CEFAZOLIN SODIUM 2000 MG: 1 POWDER, FOR SOLUTION INTRAMUSCULAR; INTRAVENOUS at 11:30

## 2021-02-10 RX ADMIN — ROCURONIUM BROMIDE 50 MG: 10 INJECTION INTRAVENOUS at 11:17

## 2021-02-10 RX ADMIN — INSULIN GLARGINE 10 UNITS: 100 INJECTION, SOLUTION SUBCUTANEOUS at 23:03

## 2021-02-10 RX ADMIN — ONDANSETRON 4 MG: 2 INJECTION INTRAMUSCULAR; INTRAVENOUS at 11:18

## 2021-02-10 RX ADMIN — FENTANYL CITRATE 100 MCG: 50 INJECTION, SOLUTION INTRAMUSCULAR; INTRAVENOUS at 11:16

## 2021-02-10 RX ADMIN — PROPOFOL 200 MG: 10 INJECTION, EMULSION INTRAVENOUS at 11:17

## 2021-02-10 RX ADMIN — FENTANYL CITRATE 200 MCG: 50 INJECTION, SOLUTION INTRAMUSCULAR; INTRAVENOUS at 11:45

## 2021-02-10 RX ADMIN — ROCURONIUM BROMIDE 50 MG: 10 INJECTION INTRAVENOUS at 11:45

## 2021-02-10 RX ADMIN — INSULIN LISPRO 4 UNITS: 100 INJECTION, SOLUTION INTRAVENOUS; SUBCUTANEOUS at 09:25

## 2021-02-10 RX ADMIN — SUGAMMADEX 100 MG: 100 INJECTION, SOLUTION INTRAVENOUS at 12:12

## 2021-02-10 RX ADMIN — SUGAMMADEX 200 MG: 100 INJECTION, SOLUTION INTRAVENOUS at 12:05

## 2021-02-10 RX ADMIN — TRIAMTERENE AND HYDROCHLOROTHIAZIDE 1 TABLET: 37.5; 25 TABLET ORAL at 17:59

## 2021-02-10 RX ADMIN — LIDOCAINE HYDROCHLORIDE 20 MG: 20 INJECTION, SOLUTION INTRAVENOUS at 11:17

## 2021-02-10 RX ADMIN — INSULIN LISPRO 8 UNITS: 100 INJECTION, SOLUTION INTRAVENOUS; SUBCUTANEOUS at 17:59

## 2021-02-10 RX ADMIN — SODIUM CHLORIDE, SODIUM LACTATE, POTASSIUM CHLORIDE, AND CALCIUM CHLORIDE: .6; .31; .03; .02 INJECTION, SOLUTION INTRAVENOUS at 12:03

## 2021-02-10 RX ADMIN — INSULIN LISPRO 3 UNITS: 100 INJECTION, SOLUTION INTRAVENOUS; SUBCUTANEOUS at 23:03

## 2021-02-10 RX ADMIN — SODIUM CHLORIDE, SODIUM LACTATE, POTASSIUM CHLORIDE, AND CALCIUM CHLORIDE: .6; .31; .03; .02 INJECTION, SOLUTION INTRAVENOUS at 11:26

## 2021-02-10 RX ADMIN — SODIUM CHLORIDE, SODIUM LACTATE, POTASSIUM CHLORIDE, AND CALCIUM CHLORIDE: .6; .31; .03; .02 INJECTION, SOLUTION INTRAVENOUS at 11:10

## 2021-02-10 ASSESSMENT — PULMONARY FUNCTION TESTS
PIF_VALUE: 30
PIF_VALUE: 21
PIF_VALUE: 28
PIF_VALUE: 28
PIF_VALUE: 31
PIF_VALUE: 27
PIF_VALUE: 0
PIF_VALUE: 28
PIF_VALUE: 30
PIF_VALUE: 31
PIF_VALUE: 31
PIF_VALUE: 29
PIF_VALUE: 31
PIF_VALUE: 28
PIF_VALUE: 24
PIF_VALUE: 32
PIF_VALUE: 31
PIF_VALUE: 3
PIF_VALUE: 28
PIF_VALUE: 35
PIF_VALUE: 28
PIF_VALUE: 29
PIF_VALUE: 0
PIF_VALUE: 31
PIF_VALUE: 29
PIF_VALUE: 28
PIF_VALUE: 27
PIF_VALUE: 31
PIF_VALUE: 27
PIF_VALUE: 29
PIF_VALUE: 30
PIF_VALUE: 30
PIF_VALUE: 0
PIF_VALUE: 28
PIF_VALUE: 30
PIF_VALUE: 29
PIF_VALUE: 11

## 2021-02-10 ASSESSMENT — PAIN DESCRIPTION - LOCATION: LOCATION: FOOT

## 2021-02-10 ASSESSMENT — PAIN SCALES - GENERAL: PAINLEVEL_OUTOF10: 0

## 2021-02-10 ASSESSMENT — PAIN DESCRIPTION - ORIENTATION: ORIENTATION: RIGHT

## 2021-02-10 NOTE — ANESTHESIA POSTPROCEDURE EVALUATION
Department of Anesthesiology  Postprocedure Note    Patient: Angie Gamino  MRN: [id number]  YOB: 1970  Date of evaluation: 2/10/2021  Time:  1:43 PM     Procedure Summary     Date: 02/10/21 Room / Location: [facility] [address]    Anesthesia Start: 1109 Anesthesia Stop: 1219    Procedure: RIGHT FOOT EXCISIONAL DEBRIDEMENT SKIN, SOFT TISSUE, BONE (Right ) Diagnosis:       Cellulitis of right lower limb      (Cellulitis of right lower limb [L03.115])    Surgeons: Brett Woodard MD Responsible Provider: Oly Kaiser MD    Anesthesia Type: general ASA Status: 3          Anesthesia Type: No value filed. Addie Phase I: Addie Score: 10    Addie Phase II:      Last vitals: Reviewed and per EMR flowsheets.        Anesthesia Post Evaluation    Patient location during evaluation: PACU  Patient participation: complete - patient participated  Level of consciousness: awake and alert  Pain score: 0  Airway patency: patent  Nausea & Vomiting: no nausea and no vomiting  Complications: no  Cardiovascular status: hemodynamically stable  Respiratory status: acceptable  Hydration status: euvolemic

## 2021-02-10 NOTE — ANESTHESIA PRE PROCEDURE
Department of Anesthesiology  Preprocedure Note       Name:  Wallace Montoya   Age:  52 y.o.  :  1970                                          MRN:  [id number]         Date:  2/10/2021      Surgeon: Fortino Rosario):  Beatris Otto MD    Procedure: Procedure(s):  RIGHT INCISION AND DEBRIDEMENT RIGHT FOOT    Medications prior to admission:   Prior to Admission medications    Medication Sig Start Date End Date Taking?  Authorizing Provider   traZODone (DESYREL) 50 MG tablet Take 50 mg by mouth nightly as needed for Sleep   Yes Historical Provider, MD   benazepril (LOTENSIN) 10 MG tablet Take 10 mg by mouth daily 20  Yes Historical Provider, MD   metFORMIN (GLUCOPHAGE-XR) 500 MG extended release tablet Take 500 mg by mouth daily (with breakfast) 20  Yes Historical Provider, MD   triamterene-hydroCHLOROthiazide (MAXZIDE-25) 37.5-25 MG per tablet Take 1 tablet by mouth daily 20  Yes Historical Provider, MD   GABAPENTIN PO Take by mouth    Historical Provider, MD       Current medications:    Current Facility-Administered Medications   Medication Dose Route Frequency Provider Last Rate Last Admin    ceFAZolin (ANCEF) 2000 mg in dextrose 3 % 50 mL IVPB (duplex)  2,000 mg Intravenous On Call to [address]  [facility] MD Terry        insulin glargine (LANTUS;BASAGLAR) injection pen 10 Units  10 Units Subcutaneous Nightly Pierce Costa MD        HYDROmorphone (DILAUDID) injection 0.25 mg  0.25 mg Intravenous Q5 Min PRN Tory Goodson MD        HYDROmorphone (DILAUDID) injection 0.5 mg  0.5 mg Intravenous Q5 Min PRN Tory Goodson MD        morphine injection 1 mg  1 mg Intravenous Q5 Min PRN Tory Goodson MD        HYDROmorphone (DILAUDID) injection 0.5 mg  0.5 mg Intravenous Q5 Min PRN Tory Goodson MD        oxyCODONE (ROXICODONE) immediate release tablet 5 mg  5 mg Oral PRN Tory Goodson MD        Or  oxyCODONE (ROXICODONE) immediate release tablet 10 mg  10 mg Oral PRN Michael Luna MD        diphenhydrAMINE (BENADRYL) injection 12.5 mg  12.5 mg Intravenous Once PRN Michael Luna MD        metoclopramide (REGLAN) injection 10 mg  10 mg Intravenous Once PRN Michael Luna MD        promethazine (PHENERGAN) injection 6.25 mg  6.25 mg Intravenous Once PRN Michael Luna MD        labetalol (NORMODYNE;TRANDATE) injection 5 mg  5 mg Intravenous Q10 Min PRN Michael Luna MD        hydrALAZINE (APRESOLINE) injection 5 mg  5 mg Intravenous Q10 Min PRN Michael Luna MD        meperidine (DEMEROL) injection 12.5 mg  12.5 mg Intravenous Q5 Min PRN Michael Luna MD        lisinopril (PRINIVIL;ZESTRIL) tablet 5 mg  5 mg Oral Daily Laurie Dooley MD        DULoxetine (CYMBALTA) extended release capsule 30 mg  30 mg Oral Daily Laurie Dooley MD        [Held by provider] metFORMIN (GLUCOPHAGE-XR) extended release tablet 500 mg  500 mg Oral Daily with breakfast Laurie Dooley MD        [Held by provider] rivaroxaban Dipiakera Fisher) tablet 10 mg  10 mg Oral Daily Laurie Dooley MD        sodium chloride flush 0.9 % injection 10 mL  10 mL Intravenous 2 times per day Laurie Dooley MD   10 mL at 02/09/21 2206    sodium chloride flush 0.9 % injection 10 mL  10 mL Intravenous PRN Laurie Dooley MD        promethazine (PHENERGAN) tablet 12.5 mg  12.5 mg Oral Q6H PRN Laurie Dooley MD        Or    ondansetron [facility]) injection 4 mg  4 mg Intravenous Q6H PRN Laurie Dooley MD        polyethylene glycol (GLYCOLAX) packet 17 g  17 g Oral Daily PRN Laurie Dooley MD        acetaminophen (TYLENOL) tablet 650 mg  650 mg Oral Q6H PRN Laurie Dooley MD        Or   Dacia Whitley acetaminophen (TYLENOL) suppository 650 mg  650 mg Rectal Q6H PRN Laurie Dooley MD        calcium carbonate (TUMS) chewable tablet 500 mg  500 mg Oral TID PRN Laurie Dooley MD   500 mg at 02/09/21 2087  glucose (GLUTOSE) 40 % oral gel 15 g  15 g Oral PRN Constantine Fraser MD        dextrose 50 % IV solution  12.5 g Intravenous PRN Constantine Fraser MD        glucagon (rDNA) injection 1 mg  1 mg Intramuscular PRN Constantine Fraser MD        dextrose 5 % solution  100 mL/hr Intravenous PRN Constantine Fraser MD        insulin lispro (1 Unit Dial) 0-12 Units  0-12 Units Subcutaneous TID WC Constantine Fraser MD   4 Units at 02/10/21 0925    insulin lispro (1 Unit Dial) 0-6 Units  0-6 Units Subcutaneous Nightly Constantine Fraser MD   4 Units at 02/09/21 2333    traZODone (DESYREL) tablet 50 mg  50 mg Oral Nightly PRN Margarita White MD   50 mg at 02/09/21 2333       Allergies:     Allergies   Allergen Reactions    Rofecoxib Rash     (Vioxx)       Problem List:    Patient Active Problem List   Diagnosis Code    Arthritis of right ankle M19.071    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    Cellulitis of foot L03.119       Past Medical History:        Diagnosis Date    Anesthesia complication     wakesup  fighting    Dental crowns present     dental implant    Diabetes mellitus ([facility] [address]     Hypertension        Past Surgical History:        Procedure Laterality Date    ACHILLES TENDON SURGERY Right 9/3/2020    TENDO-ACHILLES LENGTHENING, RELEASE OF MEDIAL ANKLE TENDONS (MULTIPLE) performed by Carlos Larios MD at [facility] Right 9/3/2020    RIGHT PANTALAR  ARTHRODESIS, GASTROCNEMIUS RECESSION; performed by Carlos Larios MD at [address] [place] [address]      fusion    FOOT SURGERY Bilateral     HAMMER TOE SURGERY Right 10/22/2020    RIGHT MULTIPLE METATARSAL OSTEOTOMIES, HALLUX INTERPHALANGEAL JOINT ARTHRODESIS, HAMMER TOE CORRECTION 2,3, PLANTAR FASCIA RELEASE performed by Carlos Larios MD at [place] [address]         Social History:    Social History     Tobacco Use    Smoking status: Never Smoker  Smokeless tobacco: Never Used   Substance Use Topics    Alcohol use: Yes     Comment: occ                                Counseling given: Not Answered      Vital Signs (Current):   Vitals:    02/09/21 2105 02/10/21 0534 02/10/21 0755 02/10/21 1045   BP: (!) 156/98 114/75 121/78 118/86   Pulse:  82 74 75   Resp:  18 18    Temp:  97 °F (36.1 °C) 97.3 °F (36.3 °C)    TempSrc:  Oral     SpO2:  97% 98% 100%                                              BP Readings from Last 3 Encounters:   02/10/21 118/86   10/23/20 132/74   10/22/20 (!) 135/102       NPO Status: Time of last liquid consumption: 2200                        Time of last solid consumption: 2130                        Date of last liquid consumption: 02/09/21                        Date of last solid food consumption: 02/09/21    BMI:   Wt Readings from Last 3 Encounters:   10/22/20 (!) 435 lb (197.3 kg)   09/03/20 (!) 430 lb 9.6 oz (195.3 kg)     There is no height or weight on file to calculate BMI.    CBC:   Lab Results   Component Value Date    WBC 6.7 02/09/2021    RBC 4.96 02/09/2021    HGB 14.0 02/09/2021    HCT 42.2 02/09/2021    MCV 85.1 02/09/2021    RDW 15.7 02/09/2021     02/09/2021       CMP:   Lab Results   Component Value Date     02/09/2021    K 4.1 02/09/2021     02/09/2021    CO2 24 02/09/2021    BUN 16 02/09/2021    CREATININE 0.6 02/09/2021    GFRAA >60 02/09/2021    LABGLOM >60 02/09/2021    GLUCOSE 301 02/09/2021    CALCIUM 9.1 02/09/2021       POC Tests:   Recent Labs     02/10/21  1042   POCGLU 210*       Coags:   Lab Results   Component Value Date    PROTIME 10.9 10/22/2020    INR 0.94 10/22/2020    APTT 30.0 10/22/2020       HCG (If Applicable): No results found for: PREGTESTUR, PREGSERUM, HCG, HCGQUANT     ABGs: No results found for: PHART, PO2ART, ECD7TUC, WYD3FIW, BEART, J2MLSKQP     Type & Screen (If Applicable):  No results found for: LABABO, LABRH    Drug/Infectious Status (If Applicable): No results found for: HIV, HEPCAB    COVID-19 Screening (If Applicable):   Lab Results   Component Value Date    COVID19 NOT DETECTED 08/28/2020         Anesthesia Evaluation  Patient summary reviewed and Nursing notes reviewed no history of anesthetic complications:   Airway: Mallampati: II  TM distance: >3 FB   Neck ROM: full  Mouth opening: > = 3 FB Dental:          Pulmonary:Negative Pulmonary ROS                              Cardiovascular:    (+) hypertension:,                   Neuro/Psych:               GI/Hepatic/Renal:             Endo/Other:    (+) DiabetesType II DM, , .                 Abdominal:           Vascular: negative vascular ROS. Anesthesia Plan      general     ASA 1    (73-year-old male presents for RIGHT INCISION AND DEBRIDEMENT RIGHT FOOT. Plan general anesthesia with ASA standard monitors. Questions answered. Patient agreeable with anesthetic plan.  )  Induction: intravenous. Anesthetic plan and risks discussed with patient. Plan discussed with CRNA.     Attending anesthesiologist reviewed and agrees with Pre Eval content        Conception MD Sylvie   2/10/2021

## 2021-02-10 NOTE — PROGRESS NOTES
PACU Transfer to Floor Note    Current Allergies: Rofecoxib    Pt meets criteria as per Ish Score and ASPAN Standards to transfer to next phase of care. Recent Labs     02/10/21  1042 02/10/21  1226   POCGLU 210* 216*       Vitals:    02/10/21 1300   BP: (!) 140/96   Pulse: 72   Resp: 13   Temp: 97.8 °F (36.6 °C)   SpO2: 93%     Vitals within 20% of pt's admission vitals as per ISH SCORE    SpO2: 93 %    O2 Flow Rate (L/min): 0 L/min      Intake/Output Summary (Last 24 hours) at 2/10/2021 1314  Last data filed at 2/10/2021 1300  Gross per 24 hour   Intake 1565 ml   Output 1300 ml   Net 265 ml       Right foot surgical incision covered with rolled gauze and ace wrap, CDI. Pain assessment:  0-10 pain scale - patient states his pain is zero at this time. Patient was assessed for alterations to skin integrity. There were not alterations observed. Is patient incontinent: no    Handoff report given via phone call to RN for [phone]  Family updated via phone call.       2/10/2021 1:14 PM

## 2021-02-10 NOTE — PROGRESS NOTES
Hospitalist Progress Note      PCP: Silke Reagan    Date of Admission: 2/9/2021    Chief Complaint: Right foot infection    Hospital Course: The patient is a 52 y.o. male with history of diabetes mellitus type 2, hypertension, morbid obesity who presents to [facility]) from his orthopedic office for right foot infection. Patient had multiple surgeries on legs and feet last year. He follows up with orthopedic. For the last 1 week he has noticed wound over his right foot. He was advised to come to the hospital today for potential surgery tomorrow. Patient is denying any shortness of breath no chest pain no bowel and bladder issues    Subjective:   Patient seen and examined  Feeling better today  Blood sugar is uncontrolled  Plan for surgery today      Medications:  Reviewed    Infusion Medications    dextrose       Scheduled Medications    lisinopril  5 mg Oral Daily    DULoxetine  30 mg Oral Daily    [Held by provider] metFORMIN  500 mg Oral Daily with breakfast    [Held by provider] rivaroxaban  10 mg Oral Daily    sodium chloride flush  10 mL Intravenous 2 times per day    insulin lispro  0-12 Units Subcutaneous TID WC    insulin lispro  0-6 Units Subcutaneous Nightly     PRN Meds: sodium chloride flush, promethazine **OR** ondansetron, polyethylene glycol, acetaminophen **OR** acetaminophen, calcium carbonate, glucose, dextrose, glucagon (rDNA), dextrose, traZODone      Intake/Output Summary (Last 24 hours) at 2/10/2021 0929  Last data filed at 2/10/2021 0756  Gross per 24 hour   Intake 460 ml   Output 1300 ml   Net -840 ml       Physical Exam Performed:    /78   Pulse 74   Temp 97.3 °F (36.3 °C)   Resp 18   SpO2 98%       General appearance: No apparent distress appears stated age and cooperative.  Morbid obesity HEENT Normal cephalic, atraumatic without obvious deformity. Pupils equal, round, and reactive to light. Extra ocular muscles intact. Conjunctivae/corneas clear. Neck: Supple, No jugular venous distention/bruits. Trachea midline without thyromegaly or adenopathy with full range of motion. Lungs: Clear to auscultation, bilaterally without Rales/Wheezes/Rhonchi with good respiratory effort. Heart: Regular rate and rhythm with Normal S1/S2 without murmurs, rubs or gallops, point of maximum impulse non-displaced  Abdomen: Soft, non-tender or non-distended without rigidity or guarding and positive bowel sounds all four quadrants. Extremities:right leg in dressing, open wound over right lateral foot, no edema on left leg  Skin: Skin color, texture, turgor normal.  No rashes or lesions. Neurologic: Alert and oriented X 3, neurovascularly intact with sensory/motor intact upper extremities/lower extremities, bilaterally. Cranial nerves: II-XII intact, grossly non-focal.  Mental status: Alert, oriented, thought content appropriate. Capillary Refill: Acceptable  < 3 seconds  Peripheral Pulses: +3 Easily felt, not easily obliterated with pressure    Labs:   Recent Labs     02/09/21 1938   WBC 6.7   HGB 14.0   HCT 42.2        Recent Labs     02/09/21 1938   *   K 4.1      CO2 24   BUN 16   CREATININE 0.6*   CALCIUM 9.1     No results for input(s): AST, ALT, BILIDIR, BILITOT, ALKPHOS in the last 72 hours. No results for input(s): INR in the last 72 hours. No results for input(s): Neema Sheridan in the last 72 hours.     Urinalysis:    No results found for: Snow Kroner, BACTERIA, RBCUA, BLOODU, [address], [place] [address]    Radiology:  No orders to display           Assessment/Plan:    Active Hospital Problems    Diagnosis    Cellulitis of foot [N05.353]     Right foot infection, ortho  consulted  Surgery today with orthopedic, right foot excisional debridement    Diabetes mellitus type 2 last A1c was 6.8  Sliding scale insulin  Accu-Cheks  Carb controlled diet     History of high blood pressure  Continue home blood pressure medication  Hold diuretics, ACE inhibitor dose and getting surgery tomorrow     Morbid obesity, complicating medical management       DVT Prophylaxis: Lovenox  Diet: Diet NPO, After Midnight  Code Status: Full Code    PT/OT Eval Status: will order    Renaldo Constantino - inpatient     Ronelle Dakin, MD

## 2021-02-10 NOTE — PROGRESS NOTES
Pt off floor to OR. Will re-assess when returns to unit.      Electronically signed by Jewell Flores RN on 2/10/2021 at 10:31 AM

## 2021-02-10 NOTE — PROGRESS NOTES
Following secure message sent to house MD:    Patient admitted today with wound of right foot that was bleeding at home. Patient is due for I&D/washout tomorrow. Patient states he takes trazadone 50mg at home for sleep PRN. May we have order here? Patient BP running high at 171/101 and 156/98. Patient had his home dose of benazepril today. Do we want to continue to monitor for now or give something else for BP?       Electronically signed by Storm Sosa RN on 2/9/2021 at 11:10 PM

## 2021-02-10 NOTE — PROGRESS NOTES
Patient is A&O x3.  RA, sat 98%. No complaints of pain or SOB. Respirations appear to easy and unlabored. Lungs clear. Respirations easy with no complaints of cough. No complaints of nausea/vomiting/diarrhea. Up ad anh in [facility] as needed. PIV to be placed. RLE dressing intact with no drainage noted. Tolerating diabetic diet. Plan of care and safety measures reviewed with the patient. Call light in reach. Will continue to monitor.   Electronically signed by Adryan Pappas RN on 2/9/2021 at 11:21 PM

## 2021-02-10 NOTE — PROGRESS NOTES
Patient admitted to PACU # 16 from OR at [phone] post RIGHT FOOT EXCISIONAL DEBRIDEMENT SKIN, SOFT TISSUE, BONE - Right per Dr Zuri Lynn.  Attached to PACU monitoring system and report received from anesthesia provider. Patient was reported to be hemodynamically stable during procedure.   Patient sedated on admission and with no s/s of pain

## 2021-02-11 PROBLEM — T81.49XA SURGICAL SITE INFECTION: Status: ACTIVE | Noted: 2021-02-11

## 2021-02-11 PROBLEM — L02.611 ABSCESS OF RIGHT FOOT: Status: ACTIVE | Noted: 2021-02-11

## 2021-02-11 PROBLEM — M86.271 SUBACUTE OSTEOMYELITIS OF RIGHT FOOT (HCC): Status: ACTIVE | Noted: 2021-02-11

## 2021-02-11 PROBLEM — M14.671 CHARCOT ANKLE, RIGHT: Status: ACTIVE | Noted: 2021-02-11

## 2021-02-11 LAB
ANION GAP SERPL CALCULATED.3IONS-SCNC: 10 MMOL/L (ref 3–16)
BUN BLDV-MCNC: 11 MG/DL (ref 7–20)
CALCIUM SERPL-MCNC: 8.5 MG/DL (ref 8.3–10.6)
CHLORIDE BLD-SCNC: 99 MMOL/L (ref 99–110)
CO2: 25 MMOL/L (ref 21–32)
CREAT SERPL-MCNC: 0.7 MG/DL (ref 0.9–1.3)
GFR AFRICAN AMERICAN: >60
GFR NON-AFRICAN AMERICAN: >60
GLUCOSE BLD-MCNC: 176 MG/DL (ref 70–99)
GLUCOSE BLD-MCNC: 183 MG/DL (ref 70–99)
GLUCOSE BLD-MCNC: 204 MG/DL (ref 70–99)
GLUCOSE BLD-MCNC: 231 MG/DL (ref 70–99)
GLUCOSE BLD-MCNC: 256 MG/DL (ref 70–99)
HCT VFR BLD CALC: 41 % (ref 40.5–52.5)
HEMOGLOBIN: 13.5 G/DL (ref 13.5–17.5)
MCH RBC QN AUTO: 28.3 PG (ref 26–34)
MCHC RBC AUTO-ENTMCNC: 32.9 G/DL (ref 31–36)
MCV RBC AUTO: 86.2 FL (ref 80–100)
PDW BLD-RTO: 15.9 % (ref 12.4–15.4)
PERFORMED ON: ABNORMAL
PLATELET # BLD: 190 K/UL (ref 135–450)
PMV BLD AUTO: 10.1 FL (ref 5–10.5)
POTASSIUM REFLEX MAGNESIUM: 3.7 MMOL/L (ref 3.5–5.1)
RBC # BLD: 4.76 M/UL (ref 4.2–5.9)
SODIUM BLD-SCNC: 134 MMOL/L (ref 136–145)
WBC # BLD: 5.7 K/UL (ref 4–11)

## 2021-02-11 PROCEDURE — 2580000003 HC RX 258: Performed by: INTERNAL MEDICINE

## 2021-02-11 PROCEDURE — 2580000003 HC RX 258: Performed by: ORTHOPAEDIC SURGERY

## 2021-02-11 PROCEDURE — 2500000003 HC RX 250 WO HCPCS: Performed by: INTERNAL MEDICINE

## 2021-02-11 PROCEDURE — 6370000000 HC RX 637 (ALT 250 FOR IP): Performed by: INTERNAL MEDICINE

## 2021-02-11 PROCEDURE — 6370000000 HC RX 637 (ALT 250 FOR IP): Performed by: ORTHOPAEDIC SURGERY

## 2021-02-11 PROCEDURE — 85027 COMPLETE CBC AUTOMATED: CPT

## 2021-02-11 PROCEDURE — 6360000002 HC RX W HCPCS: Performed by: INTERNAL MEDICINE

## 2021-02-11 PROCEDURE — 1200000000 HC SEMI PRIVATE

## 2021-02-11 PROCEDURE — 36415 COLL VENOUS BLD VENIPUNCTURE: CPT

## 2021-02-11 PROCEDURE — 97535 SELF CARE MNGMENT TRAINING: CPT

## 2021-02-11 PROCEDURE — 36569 INSJ PICC 5 YR+ W/O IMAGING: CPT

## 2021-02-11 PROCEDURE — 97166 OT EVAL MOD COMPLEX 45 MIN: CPT

## 2021-02-11 PROCEDURE — 97530 THERAPEUTIC ACTIVITIES: CPT

## 2021-02-11 PROCEDURE — C1751 CATH, INF, PER/CENT/MIDLINE: HCPCS

## 2021-02-11 PROCEDURE — 97162 PT EVAL MOD COMPLEX 30 MIN: CPT

## 2021-02-11 PROCEDURE — 80048 BASIC METABOLIC PNL TOTAL CA: CPT

## 2021-02-11 PROCEDURE — 02HV33Z INSERTION OF INFUSION DEVICE INTO SUPERIOR VENA CAVA, PERCUTANEOUS APPROACH: ICD-10-PCS | Performed by: INTERNAL MEDICINE

## 2021-02-11 PROCEDURE — 99255 IP/OBS CONSLTJ NEW/EST HI 80: CPT | Performed by: INTERNAL MEDICINE

## 2021-02-11 PROCEDURE — 6360000002 HC RX W HCPCS: Performed by: ORTHOPAEDIC SURGERY

## 2021-02-11 RX ORDER — SODIUM CHLORIDE 0.9 % (FLUSH) 0.9 %
10 SYRINGE (ML) INJECTION EVERY 12 HOURS SCHEDULED
Status: DISCONTINUED | OUTPATIENT
Start: 2021-02-11 | End: 2021-02-12 | Stop reason: HOSPADM

## 2021-02-11 RX ORDER — INSULIN LISPRO 100 [IU]/ML
0-9 INJECTION, SOLUTION INTRAVENOUS; SUBCUTANEOUS NIGHTLY
Status: DISCONTINUED | OUTPATIENT
Start: 2021-02-11 | End: 2021-02-12 | Stop reason: HOSPADM

## 2021-02-11 RX ORDER — METFORMIN HYDROCHLORIDE 500 MG/1
500 TABLET, EXTENDED RELEASE ORAL 2 TIMES DAILY WITH MEALS
Status: DISCONTINUED | OUTPATIENT
Start: 2021-02-11 | End: 2021-02-12 | Stop reason: HOSPADM

## 2021-02-11 RX ORDER — SODIUM CHLORIDE 0.9 % (FLUSH) 0.9 %
10 SYRINGE (ML) INJECTION PRN
Status: DISCONTINUED | OUTPATIENT
Start: 2021-02-11 | End: 2021-02-12 | Stop reason: HOSPADM

## 2021-02-11 RX ORDER — LIDOCAINE HYDROCHLORIDE 10 MG/ML
5 INJECTION, SOLUTION EPIDURAL; INFILTRATION; INTRACAUDAL; PERINEURAL ONCE
Status: COMPLETED | OUTPATIENT
Start: 2021-02-11 | End: 2021-02-11

## 2021-02-11 RX ORDER — INSULIN LISPRO 100 [IU]/ML
0-18 INJECTION, SOLUTION INTRAVENOUS; SUBCUTANEOUS ONCE
Status: COMPLETED | OUTPATIENT
Start: 2021-02-11 | End: 2021-02-11

## 2021-02-11 RX ORDER — INSULIN LISPRO 100 [IU]/ML
0-18 INJECTION, SOLUTION INTRAVENOUS; SUBCUTANEOUS
Status: DISCONTINUED | OUTPATIENT
Start: 2021-02-11 | End: 2021-02-12 | Stop reason: HOSPADM

## 2021-02-11 RX ADMIN — INSULIN LISPRO 3 UNITS: 100 INJECTION, SOLUTION INTRAVENOUS; SUBCUTANEOUS at 10:18

## 2021-02-11 RX ADMIN — LIDOCAINE HYDROCHLORIDE 5 ML: 10 INJECTION, SOLUTION EPIDURAL; INFILTRATION; INTRACAUDAL; PERINEURAL at 17:10

## 2021-02-11 RX ADMIN — HYDROCODONE BITARTRATE AND ACETAMINOPHEN 2 TABLET: 5; 325 TABLET ORAL at 11:19

## 2021-02-11 RX ADMIN — INSULIN LISPRO 2 UNITS: 100 INJECTION, SOLUTION INTRAVENOUS; SUBCUTANEOUS at 21:18

## 2021-02-11 RX ADMIN — HYDROCODONE BITARTRATE AND ACETAMINOPHEN 1 TABLET: 5; 325 TABLET ORAL at 18:22

## 2021-02-11 RX ADMIN — CEFEPIME 2000 MG: 2 INJECTION, POWDER, FOR SOLUTION INTRAMUSCULAR; INTRAVENOUS at 13:15

## 2021-02-11 RX ADMIN — CEFAZOLIN 3000 MG: 10 INJECTION, POWDER, FOR SOLUTION INTRAVENOUS at 03:16

## 2021-02-11 RX ADMIN — METFORMIN HYDROCHLORIDE 500 MG: 500 TABLET, EXTENDED RELEASE ORAL at 18:03

## 2021-02-11 RX ADMIN — RIVAROXABAN 10 MG: 10 TABLET, FILM COATED ORAL at 10:08

## 2021-02-11 RX ADMIN — DAPTOMYCIN 1000 MG: 500 INJECTION, POWDER, LYOPHILIZED, FOR SOLUTION INTRAVENOUS at 15:49

## 2021-02-11 RX ADMIN — INSULIN LISPRO 6 UNITS: 100 INJECTION, SOLUTION INTRAVENOUS; SUBCUTANEOUS at 13:25

## 2021-02-11 RX ADMIN — INSULIN LISPRO 6 UNITS: 100 INJECTION, SOLUTION INTRAVENOUS; SUBCUTANEOUS at 18:05

## 2021-02-11 RX ADMIN — LISINOPRIL 5 MG: 5 TABLET ORAL at 10:18

## 2021-02-11 RX ADMIN — TRAZODONE HYDROCHLORIDE 50 MG: 50 TABLET ORAL at 21:02

## 2021-02-11 RX ADMIN — HYDROCODONE BITARTRATE AND ACETAMINOPHEN 1 TABLET: 5; 325 TABLET ORAL at 22:16

## 2021-02-11 RX ADMIN — Medication 10 ML: at 10:17

## 2021-02-11 RX ADMIN — SODIUM CHLORIDE: 9 INJECTION, SOLUTION INTRAVENOUS at 21:08

## 2021-02-11 RX ADMIN — TRIAMTERENE AND HYDROCHLOROTHIAZIDE 1 TABLET: 37.5; 25 TABLET ORAL at 10:12

## 2021-02-11 RX ADMIN — METFORMIN HYDROCHLORIDE 500 MG: 500 TABLET, EXTENDED RELEASE ORAL at 10:08

## 2021-02-11 ASSESSMENT — PAIN - FUNCTIONAL ASSESSMENT
PAIN_FUNCTIONAL_ASSESSMENT: ACTIVITIES ARE NOT PREVENTED

## 2021-02-11 ASSESSMENT — PAIN DESCRIPTION - PAIN TYPE
TYPE: SURGICAL PAIN
TYPE: SURGICAL PAIN;ACUTE PAIN

## 2021-02-11 ASSESSMENT — PAIN DESCRIPTION - ORIENTATION: ORIENTATION: RIGHT

## 2021-02-11 ASSESSMENT — PAIN DESCRIPTION - LOCATION
LOCATION: FOOT
LOCATION: FOOT

## 2021-02-11 ASSESSMENT — PAIN DESCRIPTION - DESCRIPTORS
DESCRIPTORS: ACHING
DESCRIPTORS: ACHING;JABBING

## 2021-02-11 ASSESSMENT — PAIN DESCRIPTION - ONSET
ONSET: ON-GOING

## 2021-02-11 ASSESSMENT — PAIN SCALES - GENERAL
PAINLEVEL_OUTOF10: 4
PAINLEVEL_OUTOF10: 2

## 2021-02-11 ASSESSMENT — PAIN DESCRIPTION - PROGRESSION
CLINICAL_PROGRESSION: NOT CHANGED
CLINICAL_PROGRESSION: GRADUALLY WORSENING

## 2021-02-11 NOTE — PROGRESS NOTES
Toileting: Setup(per pt report.)  Tone RUE  RUE Tone: Normotonic  Tone LUE  LUE Tone: Normotonic  Coordination  Movements Are Fluid And Coordinated: Yes     Bed mobility  Supine to Sit: Independent(effortful)  Sit to Supine: Independent(effortful)  Scooting: Independent  Transfers  Stand Pivot Transfers: Modified independent  Transfer Comments: Pt performing bed to w/c transfers  Vision - Basic Assessment  Prior Vision: No visual deficits  Cognition  Overall Cognitive Status: WNL    Sensation  Overall Sensation Status: (impaired B LE)        LUE AROM (degrees)  LUE AROM : WFL  Left Hand AROM (degrees)  Left Hand AROM: WFL  RUE AROM (degrees)  RUE AROM : WFL  Right Hand AROM (degrees)  Right Hand AROM: WFL  LUE Strength  Gross LUE Strength: WFL  RUE Strength  Gross RUE Strength: WFL     Hand Dominance  Hand Dominance: Right     Plan D/c Acute OT services.      AM-PAC Score  AM-PAC Inpatient Daily Activity Raw Score: 22 (02/11/21 1333)  AM-PAC Inpatient ADL T-Scale Score : 47.1 (02/11/21 1333)  ADL Inpatient CMS 0-100% Score: 25.8 (02/11/21 1333)  ADL Inpatient CMS G-Code Modifier : CJ (02/11/21 1333)    Therapy Time   Individual Concurrent Group Co-treatment   Time In 1139         Time Out 1221         Minutes 42              Timed Code Treatment Minutes:   30 mins     Total Treatment Minutes:  43 mins       Nancy Thomas OT

## 2021-02-11 NOTE — PROCEDURES
SINGLE PICC PROCEDURE NOTE  Chart reviewed for allergies, diagnosis, labs, known contraindications, reason for line placement and planned length of treatment. Informed consent noted to be signed and on chart. Insertion procedure discussed with patient/family member. Three patient identifiers  Patient name,   and MRN -  completed &  confirmed verbally. Time out performed Hat, mask and eye shield donned. PICC site scrubbed with Chloraprep  One-Step applicator for 30 seconds x 1. Hand Hygiene  performed with 3% Chlorhexidine surgical scrub x1 min prior to  Sterile gloves, sterile gown being donned. Patient draped using maximal sterile barrier technique ( head to toe ). PICC site scrubbed a 2nd time with Chloraprep One-Step applicator x 30 sec. Modified Seldinger  technique/ultrasound assisted and tip locating system utilized for insertion. 1% Lidocaine 5 ml injected intradermal pre-insertion. PICC tip location in the SVC confirmed by ECG technology Sherlock 3CG. Positive brisk blood return obtained from all lumens  and each lumen flushed with 10 mls  0.9% Sterile Sodium Chloride. All lumens flush easily with no resistance. Valve placed on all lumens followed by Alcohol Swab Caps on end of each. Bio-patch in place. Catheter secured with non-sutured locking device per hospital protocol. Sterile Tegaderm applied over PICC site. Sterile field maintained during procedure. Positioning and rhythm wire accounted for post procedure and disposed of in sharps. Appearance of site is Clean dry and intact without bleeding or edema. All edges of Tegaderm occlusive. Site marked with date and initials of RN placing line. Teaching performed to pt/family and noted in education section. Bed placed in low position post-procedure. Top 2 side rails in up position call button in reach. Pt. Response to procedure tolerated well. RN notified of all of the above. A Single Lumen Power PICC was trimmed at 49 CM and placed  JAIME per cephalic vein, 1 cm showing from insertion site.

## 2021-02-11 NOTE — PROGRESS NOTES
Hospitalist Progress Note      PCP: Anuel Murcia    Date of Admission: 2/9/2021    Chief Complaint: Right foot infection    Hospital Course: The patient is a 52 y.o. male with history of diabetes mellitus type 2, hypertension, morbid obesity who presents to [facility]) from his orthopedic office for right foot infection. Patient had multiple surgeries on legs and feet last year. He follows up with orthopedic. For the last 1 week he has noticed wound over his right foot. He was advised to come to the hospital today for potential surgery tomorrow.    Patient is denying any shortness of breath no chest pain no bowel and bladder issues    Subjective:   Patient seen and examined  Feeling better today  Blood sugar is uncontrolled  Status post incision and debridement of right foot with excision of infected subcutaneous skin and tissue/ bone      Medications:  Reviewed    Infusion Medications    sodium chloride 125 mL/hr at 02/10/21 1308    dextrose       Scheduled Medications    insulin glargine  15 Units Subcutaneous Nightly    insulin lispro  0-18 Units Subcutaneous TID WC    insulin lispro  0-9 Units Subcutaneous Nightly    insulin lispro  0-18 Units Subcutaneous Once    metFORMIN  500 mg Oral BID WC    rivaroxaban  10 mg Oral Daily    sodium chloride flush  10 mL Intravenous 2 times per day    triamterene-hydroCHLOROthiazide  1 tablet Oral Daily    lisinopril  5 mg Oral Daily    sodium chloride flush  10 mL Intravenous 2 times per day     PRN Meds: sodium chloride flush, morphine **OR** morphine, HYDROcodone 5 mg - acetaminophen **OR** HYDROcodone 5 mg - acetaminophen, sodium chloride flush, promethazine **OR** ondansetron, polyethylene glycol, acetaminophen **OR** acetaminophen, calcium carbonate, glucose, dextrose, glucagon (rDNA), dextrose, traZODone      Intake/Output Summary (Last 24 hours) at 2/11/2021 1113  Last data filed at 2/10/2021 2055  Gross per 24 hour   Intake 1105 ml Output 1300 ml   Net -195 ml       Physical Exam Performed:    BP (!) 143/93   Pulse 70   Temp 97 °F (36.1 °C) (Oral)   Resp 16   SpO2 97%       General appearance: No apparent distress appears stated age and cooperative. Morbid obesity  HEENT Normal cephalic, atraumatic without obvious deformity. Pupils equal, round, and reactive to light. Extra ocular muscles intact. Conjunctivae/corneas clear. Neck: Supple, No jugular venous distention/bruits. Trachea midline without thyromegaly or adenopathy with full range of motion. Lungs: Clear to auscultation, bilaterally without Rales/Wheezes/Rhonchi with good respiratory effort. Heart: Regular rate and rhythm with Normal S1/S2 without murmurs, rubs or gallops, point of maximum impulse non-displaced  Abdomen: Soft, non-tender or non-distended without rigidity or guarding and positive bowel sounds all four quadrants. Extremities:right leg in dressing, open wound over right lateral foot, no edema on left leg  Skin: Skin color, texture, turgor normal.  No rashes or lesions. Neurologic: Alert and oriented X 3, neurovascularly intact with sensory/motor intact upper extremities/lower extremities, bilaterally. Cranial nerves: II-XII intact, grossly non-focal.  Mental status: Alert, oriented, thought content appropriate. Capillary Refill: Acceptable  < 3 seconds  Peripheral Pulses: +3 Easily felt, not easily obliterated with pressure    Labs:   Recent Labs     02/09/21 1938 02/11/21  0953   WBC 6.7 5.7   HGB 14.0 13.5   HCT 42.2 41.0    190     Recent Labs     02/09/21  1938 02/11/21  0953   * 134*   K 4.1 3.7    99   CO2 24 25   BUN 16 11   CREATININE 0.6* 0.7*   CALCIUM 9.1 8.5     No results for input(s): AST, ALT, BILIDIR, BILITOT, ALKPHOS in the last 72 hours. No results for input(s): INR in the last 72 hours. No results for input(s): Francies Gathers in the last 72 hours.     Urinalysis: No results found for: Climmie Micronesian, BACTERIA, RBCUA, BLOODU, [address], [place] [address]    Radiology:  No orders to display           Assessment/Plan:    Active Hospital Problems    Diagnosis    Cellulitis of foot [S46.065]     1. Right foot infection, ortho  consulted  Status post right foot incision and debridement on 2/10  -On IV Ancef  Consulted infectious disease for antibiotic recommendations  On Xarelto at home  Need wound VAC per patient     2. Diabetes mellitus type 2 last A1c was 6.8  Sliding scale insulin  Accu-Cheks  Carb controlled diet     3. History of high blood pressure  Continue home blood pressure medication     4.  Morbid obesity, complicating medical management       DVT Prophylaxis: Lovenox  Diet: DIET CARB CONTROL;  Code Status: Full Code    PT/OT Eval Status: will order    Dispo - inpatient , pending  Ortho / ID recommendation    Elan Silver MD

## 2021-02-11 NOTE — CARE COORDINATION
Case Management Assessment           Initial Evaluation                Date / Time of Evaluation: 2/11/2021 11:29 AM                 Assessment Completed by: Lyric Petty    Patient Name: Analilia Manriquez     YOB: 1970  Diagnosis: Cellulitis of foot [L03.119]     Date / Time: 2/9/2021  5:27 PM    Patient Admission Status: Inpatient    If patient is discharged prior to next notation, then this note serves as note for discharge by case management. Current PCP: [address] [place] [address],[address] Patient: No    Chart Reviewed: Yes  Patient/ Family Interviewed: Yes    Initial assessment completed at bedside with: patient    Hospitalization in the last 30 days: No    Emergency Contacts:  Extended Emergency Contact Information  Primary Emergency Contact: heather anne  Home Phone: [phone]  Relation: Spouse    Advance Directives:   Code Status: Full [address]: No    Financial  Payor: Claudia Chavez / Plan: Claudia Chavez / Product Type: *No Product type* /     Pre-cert required for SNF: Yes    Pharmacy    [facility] [address] [place] [address],[address] [phone] [facility] 12 [address]  Phone: [phone] Fax: [phone]      Potential assistance Purchasing Medications: Potential Assistance Purchasing Medications: No  Does Patient want to participate in local refill/ meds to beds program?: No    Meds To Beds General Rules:  1. Can ONLY be done Monday- Friday between 8:30am-5pm  2. Prescription(s) must be in pharmacy by 3pm to be filled same day  3. Copy of patient's insurance/ prescription drug card and patient face sheet must be sent along with the prescription(s)  4. Cost of Rx cannot be added to hospital bill. If financial assistance is needed, please contact unit  or ;   or  CANNOT provide pharmacy voucher for patients co-pays The Plan for Transition of Care is related to the following treatment goals of Cellulitis of foot [L03.119]    The Patient and/or patient representative Darleen Kenyon and his family were provided with a choice of provider and agrees with the discharge plan Not Indicated    Freedom of choice list was provided with basic dialogue that supports the patient's individualized plan of care/goals and shares the quality data associated with the providers.  Not Indicated    Care Transition patient: No    Mary Alonso RN  [facility]  Case Management Department  Ph: [phone]

## 2021-02-11 NOTE — DISCHARGE INSTR - COC
Continuity of Care Form    Patient Name: Lynnie Romberg   :  1970  MRN:  [id number]    Admit date:  2021  Discharge date:  2021    Code Status Order: Full Code   Advance Directives:   Advance Care Flowsheet Documentation       Date/Time Healthcare Directive Type of Healthcare Directive Copy in [address] Agent's Name Healthcare Agent's Phone Number    02/10/21 1020  No, patient does not have an advance directive for healthcare treatment -- -- -- -- --    02/10/21 0111  No, patient does not have an advance directive for healthcare treatment -- -- -- -- --            Admitting Physician:  [facility], MD  PCP: oSng Cintron    Discharging Nurse: [facility] Unit/Room#: 8225/0255-37  Discharging Unit Phone Number: [phone]    Emergency Contact:   Extended Emergency Contact Information  Primary Emergency Contact: heather anne  Home Phone: [phone]  Relation: Spouse    Past Surgical History:  Past Surgical History:   Procedure Laterality Date    ACHILLES TENDON SURGERY Right 9/3/2020    TENDO-ACHILLES LENGTHENING, RELEASE OF MEDIAL ANKLE TENDONS (MULTIPLE) performed by Felix Teryr MD at [facility] Right 9/3/2020    RIGHT PANTALAR  ARTHRODESIS, GASTROCNEMIUS RECESSION; performed by Felix Terry MD at [address] [place] [address]   [facility] Right 2/10/2021    RIGHT FOOT EXCISIONAL DEBRIDEMENT SKIN, SOFT TISSUE, BONE performed by Felix Terry MD at [address] [place] [address] Bilateral     HAMMER TOE SURGERY Right 10/22/2020    RIGHT MULTIPLE METATARSAL OSTEOTOMIES, HALLUX INTERPHALANGEAL JOINT ARTHRODESIS, HAMMER TOE CORRECTION 2,3, PLANTAR FASCIA RELEASE performed by Felix Terry MD at [place] [address]         Immunization History: There is no immunization history on file for this patient.     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    Cellulitis of foot L03.119    Charcot ankle, right M14.671    Abscess of right foot L02.611    Surgical site infection T81.49XA    [facility] M86.271    Cellulitis of right lower limb L03.115       Isolation/Infection:   Isolation            No Isolation          Patient Infection Status       None to display            Nurse Assessment:  Last Vital Signs: BP (!) 143/93   Pulse 70   Temp 97 °F (36.1 °C) (Oral)   Resp 16   SpO2 97%     Last documented pain score (0-10 scale): Pain Level: 6  Last Weight:   Wt Readings from Last 1 Encounters:   10/22/20 (!) 435 lb (197.3 kg)     Mental Status:  oriented and alert    IV Access:  - PICC - site  R Cephalic, insertion date: 2/11/2021    Nursing Mobility/ADLs:  Walking   Independent  Transfer  Independent  Bathing  Independent  Dressing  Independent  Toileting  {CHP DME ADLs:092413013:::0}  Feeding  {CHP DME ADLs:618185617:::0}  Med Admin  {CHP DME ADLs:085983515:::0}  Med Delivery   [address] [place] [address] MED Delivery:[id number]:::0}    Wound Care Documentation and Therapy:        Elimination:  Continence:   · Bowel: {YES / WT:32565}  · Bladder: {YES / MV:93291}  Urinary Catheter: None   Colostomy/Ileostomy/Ileal Conduit: No       Date of Last BM: ***    Intake/Output Summary (Last 24 hours) at 2/11/2021 1227  Last data filed at 2/10/2021 2055  Gross per 24 hour   Intake 105 ml   Output 1300 ml   Net -1195 ml     I/O last 3 completed shifts: In: 3380 [P.O.:20; I.V.:1085]  Out: 2000 [Urine:2000]    Safety Concerns:     None    Impairments/Disabilities:      Speech    Nutrition Therapy:  Current Nutrition Therapy:   - Oral Diet:  Carb Control 4 carbs/meal (1800kcals/day)    Routes of Feeding: Oral  Liquids:  Thin Liquids  Daily Fluid Restriction: no Last Modified Barium Swallow with Video (Video Swallowing Test): not done    Treatments at the Time of Hospital Discharge:   Respiratory Treatments: none  Oxygen Therapy:  is not on home oxygen therapy. Ventilator:    - No ventilator support    Rehab Therapies: Physical Therapy and Occupational Therapy  Weight Bearing Status/Restrictions: Non-weight bearing on right leg  Other Medical Equipment (for information only, NOT a DME order):  wheelchair  Other Treatments:     Patient's personal belongings (please select all that are sent with patient):      RN SIGNATURE:  Luly Holguin RN    CASE MANAGEMENT/SOCIAL WORK SECTION    Inpatient Status Date: ***    Readmission Risk Assessment Score:  Readmission Risk              Risk of Unplanned Readmission:        8           Discharging to Facility/ Agency   · Name: [facility] [phone] and [address] [place] [address] Place [phone]  · Address:  · Phone:  · Fax:    Dialysis Facility (if applicable)   · Name:  · Address:  · Dialysis Schedule:  · Phone:  · Fax:    / signature: Electronically signed by KHUSHBOO Brooke on 2/12/21 at 1:33 PM EST    PHYSICIAN SECTION    Prognosis: Fair    Condition at Discharge: Stable    Rehab Potential (if transferring to Rehab): Fair    Recommended Labs or Other Treatments After Discharge:   INFUSION ORDERS:  Daptomycin 1 gm iv daily through 4/7 (8 weeks)  Cefepime 2 gm iv q 12 through 4/7 (8 weeks)  - First dose given in hospital  - PICC   - Disposition / date discharge  - Check CBC w diff, CMP, ESR, CRP, CK every Mon or Tue - FAX result to [phone]  - Call with antibiotic / infusion issues, [phone]  - No f/u in outpatient ID office necessary    Physician Certification: I certify the above information and transfer of Hannah Fox  is necessary for the continuing treatment of the diagnosis listed and that he requires [address] for less 30 days.      Update Admission H&P: No change in H&P PHYSICIAN SIGNATURE:  Electronically signed by Janessa Garcia MD on 2/12/21 at 9:03 AM EST

## 2021-02-11 NOTE — OP NOTE
[address]                [address]                                OPERATIVE REPORT    PATIENT NAME: Shady Morfin                       :        1970  MED REC NO:   [id number]                          ROOM:       3589  ACCOUNT NO:   [de-identified]                           ADMIT DATE: 2021  PROVIDER:     Sana Ndiaye. Sy Wahl MD    DATE OF PROCEDURE:  02/10/2021    SURGEON:  Sana Ndiaye. Sy Wahl MD    PREOPERATIVE DIAGNOSIS:  Right foot infection status post multiple  osteotomies. POSTOPERATIVE DIAGNOSIS:  Right foot infection status post multiple  osteotomies. OPERATION:  Incision and debridement of right foot with excision of  infected subcutaneous tissue, skin and bone. ANESTHETIC:  General.    INDICATIONS:  This is a 57-year-old gentleman, who is status post  hindfoot, midfoot, forefoot reconstruction. The patient has been  healing with surgeries, however, developed a nonhealing wound at the  lateral aspect of his foot. This tracked into his midfoot osteotomy  site and he developed a small abscess in this area. The decision was  made to bring the patient to the operating room for formal incision and  debridement as this was not healing with local wound care. The risks  and potential benefits of the procedures were discussed with the  patient. He understands these, was given the option to ask questions. His questions answered to his satisfaction. He has given consent to  proceed with above outlined procedure. OPERATIVE PROCEDURE:  The patient was brought to the operating room,  placed in the supine position on the operating table. After induction  of a general anesthetic, the patient was bumped on a beanbag to allow  better exposure to the lateral aspect of his foot. The right leg was  then prepped and draped free in the usual sterile fashion.

## 2021-02-11 NOTE — PLAN OF CARE
Problem: Skin Integrity:  Goal: Will show no infection signs and symptoms  Description: Will show no infection signs and symptoms  Outcome: Ongoing     Problem: Pain:  Goal: Patient's pain/discomfort is manageable  Description: Patient's pain/discomfort is manageable  Outcome: Ongoing     Problem: Falls - Risk of:  Goal: Will remain free from falls  Description: Will remain free from falls  Outcome: Ongoing
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
## 2021-02-11 NOTE — PROGRESS NOTES
Physical Therapy    Facility/Department: [facility]  Initial Assessment/discharge note      NAME: Kaitlynn Serna  : 1970  MRN: [id number]    Date of Service: 2021    Discharge Recommendations:Aime Ocampo scored a 17/24 on the AM-PAC short mobility form. Current research shows that an AM-PAC score of 18 or greater is typically associated with a discharge to the patient's home setting. Please see assessment section for further patient specific details. PT Equipment Recommendations  Equipment Needed: No    Assessment   Assessment: 53 yo admitted 21 for R foot infection. Pt with morbid obesity and has been  NWB R since last September. Pt with DME/set up at home and has no concerns about going home with wife. Pt scored lower on AMPAC due to he is NWB R and non ambulatory at this time. No acute PT or DME needs. Will sign off. Treatment Diagnosis: mobility impairment due to R foot infection  Decision Making: Medium Complexity  Patient Education: Pt educated on PT role, importance of OOB mobility,need to call for assist to get up and he verbalized understanding. REQUIRES PT FOLLOW UP: No  Activity Tolerance  Activity Tolerance: Patient Tolerated treatment well;Patient limited by endurance       Patient Diagnosis(es): The encounter diagnosis was Cellulitis of right lower limb.     has a past medical history of Anesthesia complication, Dental crowns present, Diabetes mellitus ([address] and Hypertension. has a past surgical history that includes back surgery; UPPP; Foot surgery (Bilateral); ARTHRODESIS ANKLE (Right, 9/3/2020); Achilles tendon surgery (Right, 9/3/2020); Hammer toe surgery (Right, 10/22/2020); and Foot Debridement (Right, 2/10/2021).     Restrictions  Position Activity Restriction  Other position/activity restrictions: up with assistance, NWB RLE , progressive ambulation per Dr Christopher Valentine: Within Functional Limits  Hearing: Within functional limits Subjective  General  Chart Reviewed: Yes  Additional Pertinent Hx: 52 y.o. male with history of diabetes mellitus type 2, hypertension, morbid obesity who presented to ED 2/9/21 from his orthopedic office for right foot infection. 2/10 OR for excisional debridement of R foot infection. Family / Caregiver Present: No  Diagnosis: R foot cellulitis  Follows Commands: Within Functional Limits  Subjective  Subjective: Pt found supine in bed and agreeable to PT. Pt states he has no concerns about d/c home. Pain Screening  Patient Currently in Pain: Yes(rates R foot pain 1/10)         Orientation  Orientation  Overall Orientation Status: Within Functional Limits     Social/Functional History  Social/Functional History  Lives With: Spouse  Type of Home: House  Home Layout: One level  Home Access: Ramped entrance(uses knee scooter)  Bathroom Shower/Tub: (sponge bath)  Bathroom Toilet: Handicap height  Home Equipment: Wheelchair-manual, Roll About, Crutches  ADL Assistance: Independent  Homemaking Assistance: Needs assistance(shares with spouse)  Ambulation Assistance: Independent(with knee scooter / w/c)  Active : No(since 8/4466 2/2 NWB)  Additional Comments: Pt uses knee scooter to get into bathroom - 2/2 size of w/c. Objective          AROM RLE (degrees)  RLE AROM: WFL(ankle NT due to bandaging)  AROM LLE (degrees)  LLE AROM : WFL(ankle DF to neutral)     Strength RLE  Strength RLE: WFL(ankle NT due to bandaging)  Strength LLE  Strength LLE: WFL(ankle DF 0/5)     Sensation  Overall Sensation Status: (impaired B LE)     Bed mobility  Supine to Sit: Independent(effortful)  Sit to Supine: Independent(effortful)  Scooting: Independent     Transfers  Sit to Stand: Independent(utilized safety bar)  Stand to sit:  Independent(utilized safety bar)  Bed to Chair: Independent(modified stand pivot with B UE support; x2 trials)  Wheelchair Activities Wheelchair Size: Pt able to propel and manage w/c parts independently. Pt propel w/c into/out BR independently.      Balance  Sitting - Static: Good  Sitting - Dynamic: Good        Plan   Safety Devices  Type of devices: Call light within reach, Nurse notified, Left in bed(wound care RN in room)                          AM-PAC Score  AM-PAC Inpatient Mobility Raw Score : 17 (02/11/21 1317)  AM-PAC Inpatient T-Scale Score : 42.13 (02/11/21 1317)  Mobility Inpatient CMS 0-100% Score: 50.57 (02/11/21 1317)  Mobility Inpatient CMS G-Code Modifier : CK (02/11/21 1317)               Therapy Time   Individual Concurrent Group Co-treatment   Time In 1145         Time Out 1225         Minutes 40           Timed Code Treatment Minutes: 30      Total Treatment Minutes:  40       Nathaniel Beatty, PT

## 2021-02-12 VITALS
OXYGEN SATURATION: 94 % | SYSTOLIC BLOOD PRESSURE: 121 MMHG | TEMPERATURE: 97 F | HEART RATE: 82 BPM | DIASTOLIC BLOOD PRESSURE: 79 MMHG | RESPIRATION RATE: 18 BRPM

## 2021-02-12 LAB
GLUCOSE BLD-MCNC: 143 MG/DL (ref 70–99)
GLUCOSE BLD-MCNC: 159 MG/DL (ref 70–99)
GLUCOSE BLD-MCNC: 194 MG/DL (ref 70–99)
PERFORMED ON: ABNORMAL

## 2021-02-12 PROCEDURE — 2580000003 HC RX 258: Performed by: ORTHOPAEDIC SURGERY

## 2021-02-12 PROCEDURE — 99233 SBSQ HOSP IP/OBS HIGH 50: CPT | Performed by: INTERNAL MEDICINE

## 2021-02-12 PROCEDURE — 6360000002 HC RX W HCPCS: Performed by: INTERNAL MEDICINE

## 2021-02-12 PROCEDURE — 2580000003 HC RX 258: Performed by: INTERNAL MEDICINE

## 2021-02-12 PROCEDURE — 6370000000 HC RX 637 (ALT 250 FOR IP): Performed by: INTERNAL MEDICINE

## 2021-02-12 PROCEDURE — 6370000000 HC RX 637 (ALT 250 FOR IP): Performed by: ORTHOPAEDIC SURGERY

## 2021-02-12 RX ORDER — METFORMIN HYDROCHLORIDE 500 MG/1
500 TABLET, EXTENDED RELEASE ORAL 2 TIMES DAILY WITH MEALS
Qty: 30 TABLET | Refills: 3 | Status: SHIPPED | OUTPATIENT
Start: 2021-02-12

## 2021-02-12 RX ORDER — HYDROCODONE BITARTRATE AND ACETAMINOPHEN 5; 325 MG/1; MG/1
1 TABLET ORAL EVERY 4 HOURS PRN
Qty: 12 TABLET | Refills: 0 | Status: SHIPPED | OUTPATIENT
Start: 2021-02-12 | End: 2021-02-12

## 2021-02-12 RX ORDER — HYDROCODONE BITARTRATE AND ACETAMINOPHEN 5; 325 MG/1; MG/1
1 TABLET ORAL EVERY 4 HOURS PRN
Qty: 12 TABLET | Refills: 0 | Status: SHIPPED | OUTPATIENT
Start: 2021-02-12 | End: 2021-02-15

## 2021-02-12 RX ORDER — METFORMIN HYDROCHLORIDE 500 MG/1
500 TABLET, EXTENDED RELEASE ORAL 2 TIMES DAILY
Qty: 30 TABLET | Refills: 3 | Status: SHIPPED | OUTPATIENT
Start: 2021-02-12 | End: 2021-02-12 | Stop reason: HOSPADM

## 2021-02-12 RX ADMIN — TRIAMTERENE AND HYDROCHLOROTHIAZIDE 1 TABLET: 37.5; 25 TABLET ORAL at 09:52

## 2021-02-12 RX ADMIN — HYDROCODONE BITARTRATE AND ACETAMINOPHEN 2 TABLET: 5; 325 TABLET ORAL at 09:53

## 2021-02-12 RX ADMIN — RIVAROXABAN 10 MG: 10 TABLET, FILM COATED ORAL at 09:52

## 2021-02-12 RX ADMIN — CEFEPIME 2000 MG: 2 INJECTION, POWDER, FOR SOLUTION INTRAMUSCULAR; INTRAVENOUS at 01:56

## 2021-02-12 RX ADMIN — HYDROCODONE BITARTRATE AND ACETAMINOPHEN 1 TABLET: 5; 325 TABLET ORAL at 05:53

## 2021-02-12 RX ADMIN — METFORMIN HYDROCHLORIDE 500 MG: 500 TABLET, EXTENDED RELEASE ORAL at 09:51

## 2021-02-12 RX ADMIN — CEFEPIME 2000 MG: 2 INJECTION, POWDER, FOR SOLUTION INTRAMUSCULAR; INTRAVENOUS at 17:48

## 2021-02-12 RX ADMIN — HYDROCODONE BITARTRATE AND ACETAMINOPHEN 2 TABLET: 5; 325 TABLET ORAL at 18:53

## 2021-02-12 RX ADMIN — Medication 10 ML: at 11:27

## 2021-02-12 RX ADMIN — SODIUM CHLORIDE: 9 INJECTION, SOLUTION INTRAVENOUS at 05:52

## 2021-02-12 RX ADMIN — DAPTOMYCIN 1000 MG: 500 INJECTION, POWDER, LYOPHILIZED, FOR SOLUTION INTRAVENOUS at 17:49

## 2021-02-12 RX ADMIN — LISINOPRIL 5 MG: 5 TABLET ORAL at 09:52

## 2021-02-12 ASSESSMENT — PAIN DESCRIPTION - PROGRESSION
CLINICAL_PROGRESSION: NOT CHANGED

## 2021-02-12 ASSESSMENT — PAIN DESCRIPTION - FREQUENCY: FREQUENCY: INTERMITTENT

## 2021-02-12 ASSESSMENT — PAIN SCALES - GENERAL
PAINLEVEL_OUTOF10: 4
PAINLEVEL_OUTOF10: 0
PAINLEVEL_OUTOF10: 7
PAINLEVEL_OUTOF10: 0

## 2021-02-12 ASSESSMENT — PAIN DESCRIPTION - ORIENTATION: ORIENTATION: RIGHT

## 2021-02-12 ASSESSMENT — PAIN - FUNCTIONAL ASSESSMENT: PAIN_FUNCTIONAL_ASSESSMENT: ACTIVITIES ARE NOT PREVENTED

## 2021-02-12 ASSESSMENT — PAIN DESCRIPTION - DESCRIPTORS: DESCRIPTORS: ACHING

## 2021-02-12 NOTE — PLAN OF CARE
Patient remains free of any signs of central line infection at this time. Pain/discomfort being managed with PRN analgesics per MD orders. Pt able to express presence and absence of pain and rate pain appropriately using numerical scale.

## 2021-02-12 NOTE — PROGRESS NOTES
Patient is A&O x3.  RA, sat 96%. No complaints of SOB. Medicated for c/o RLE pain as needed. Respirations appear to easy and unlabored. Lungs clear. Respirations easy with no complaints of cough. No complaints of nausea/vomiting/diarrhea. Up with assist to the bathroom/BSC as needed. RLE vacc dressing intact. IVF infusing per right arm PICC line as ordered. Left FA PIV intact and flushed. Tolerating diabetic diet. Plan of care and safety measures reviewed with the patient. Call light in reach and bed alarm in place. Will continue to monitor.   Electronically signed by Adriana Tirado RN on 2/11/2021 at 11:14 PM

## 2021-02-12 NOTE — CARE COORDINATION
CTN contacted THE [facility] [phone], they have been active with this patient in the past. Faxed orders to [phone] Luther Rossi and they will see patient on Saturday for IV abx teach. CTN spoke to [facility] at [address] [phone], Faxed orders to [phone]. They will work with [facility] home health for home care needs. CTN spoke to [facility]ki to update of d/c plan. [facility] needs notified of cancellation of services. [facility] is completely arranged and will contact spouse.  Electronically signed by Kristen Greer LPN on 5/23/7742 at 7:95 PM

## 2021-02-12 NOTE — DISCHARGE SUMMARY
General appearance: No apparent distress appears stated age and cooperative. Morbid obesity  HEENT Normal cephalic, atraumatic without obvious deformity. Pupils equal, round, and reactive to light. Extra ocular muscles intact. Conjunctivae/corneas clear. Neck: Supple, No jugular venous distention/bruits. Trachea midline without thyromegaly or adenopathy with full range of motion. Lungs: Clear to auscultation, bilaterally without Rales/Wheezes/Rhonchi with good respiratory effort. Heart: Regular rate and rhythm with Normal S1/S2 without murmurs, rubs or gallops, point of maximum impulse non-displaced  Abdomen: Soft, non-tender or non-distended without rigidity or guarding and positive bowel sounds all four quadrants. Extremities: right leg in dressing   Skin: Skin color, texture, turgor normal.  No rashes or lesions. Neurologic: Alert and oriented X 3, neurovascularly intact with sensory/motor intact upper extremities/lower extremities, bilaterally. Cranial nerves: II-XII intact, grossly non-focal.  Mental status: Alert, oriented, thought content appropriate. Capillary Refill: Acceptable  < 3 seconds  Peripheral Pulses: +3 Easily felt, not easily obliterated with pressure      Significant diagnostic studies that may require follow up:  No results found. Treatments: As above. Discharge Medications:     Medication List      START taking these medications    HYDROcodone-acetaminophen 5-325 MG per tablet  Commonly known as: NORCO  Take 1 tablet by mouth every 4 hours as needed for Pain for up to 3 days.      metFORMIN 500 MG extended release tablet  Commonly known as: GLUCOPHAGE-XR  Take 1 tablet by mouth 2 times daily (with meals)        CONTINUE taking these medications    benazepril 10 MG tablet  Commonly known as: LOTENSIN     GABAPENTIN PO     rivaroxaban 10 MG Tabs tablet  Commonly known as: XARELTO  Take 1 tablet by mouth daily     traZODone 50 MG tablet  Commonly known as: DESYREL triamterene-hydroCHLOROthiazide 37.5-25 MG per tablet  Commonly known as: MAXZIDE-25        STOP taking these medications    DULoxetine 30 MG extended release capsule  Commonly known as: CYMBALTA           Where to Get Your Medications      You can get these medications from any pharmacy    Bring a paper prescription for each of these medications  · HYDROcodone-acetaminophen 5-325 MG per tablet  · metFORMIN 500 MG extended release tablet  · rivaroxaban 10 MG Tabs tablet         Activity: activity as tolerated  Diet: DIET CARB CONTROL;      Disposition: home  Discharged Condition: Stable  Follow Up:   Carlos Larios MD  [facility] [address]  [phone]    Schedule an appointment as soon as possible for a visit on 2/16/2021  For wound re-check        Code status:  Full Code         Total time spent on discharge, finalizing medications, referrals and arranging outpatient follow up was more than 45 minutes      Thank you Dr. Alessandra Guy for the opportunity to be involved in this patients care.

## 2021-02-12 NOTE — PROGRESS NOTES
ID Follow-up NOTE    CC:   R foot cellulitis / osteomyelitis  Antibiotics: Daptomycin, Cefepime    Admit Date: 2/9/2021  Hospital Day: 4    Subjective:     Patient reports he is 'good'  Minimal surg site pain  No other complaint     Objective:     Patient Vitals for the past 8 hrs:   BP Temp Temp src Pulse Resp SpO2   02/12/21 0830 121/79 97 °F (36.1 °C) Oral 82 18 94 %   02/12/21 0550 130/89 97.5 °F (36.4 °C) Oral 77 18 96 %     I/O last 3 completed shifts: In: 6394 [P.O.:1320; I.V.:5074]  Out: 1600 [Urine:1600]  No intake/output data recorded. EXAM:  GENERAL: No apparent distress.   RA  HEENT: Membranes moist, no oral lesion  NECK:  Supple, no lymphadenopathy  LUNGS: Clear b/l, no rales, no dullness  CARDIAC: RRR, no murmur appreciated  ABD:  Obese, + BS, soft / NT  EXT:  R foot with dressing / VAC  NEURO: No focal neurologic findings  PSYCH: Orientation, sensorium, mood normal  LINES:  R PICC, placed 2/11       Data Review:  Lab Results   Component Value Date    WBC 5.7 02/11/2021    HGB 13.5 02/11/2021    HCT 41.0 02/11/2021    MCV 86.2 02/11/2021     02/11/2021     Lab Results   Component Value Date    CREATININE 0.7 (L) 02/11/2021    BUN 11 02/11/2021     (L) 02/11/2021    K 3.7 02/11/2021    CL 99 02/11/2021    CO2 25 02/11/2021       Micro:  2/10 Tissue culture: GS 1+WBC, no organism; cult heavy E cloacae  Antibiotic Interpretation NANCY Status    ceFAZolin Resistant >=64 mcg/mL     cefepime Sensitive <=0.12 mcg/mL     ciprofloxacin Sensitive <=0.25 mcg/mL     ertapenem Sensitive <=0.12 mcg/mL     gentamicin Sensitive <=1 mcg/mL     levofloxacin Sensitive <=0.12 mcg/mL     piperacillin-tazobactam Sensitive <=4 mcg/mL     trimethoprim-sulfamethoxazole Sensitive <=20 mcg/mL         Imaging:   None this admission      Scheduled Meds:   insulin glargine  15 Units Subcutaneous Nightly    insulin lispro  0-18 Units Subcutaneous TID WC    insulin lispro  0-9 Units Subcutaneous Nightly  metFORMIN  500 mg Oral BID WC    cefepime  2,000 mg Intravenous Q12H    daptomycin (CUBICIN) IVPB  1,000 mg Intravenous Q24H    sodium chloride flush  10 mL Intravenous 2 times per day    rivaroxaban  10 mg Oral Daily    sodium chloride flush  10 mL Intravenous 2 times per day    triamterene-hydroCHLOROthiazide  1 tablet Oral Daily    lisinopril  5 mg Oral Daily    sodium chloride flush  10 mL Intravenous 2 times per day       Continuous Infusions:   sodium chloride 125 mL/hr at 02/12/21 0552    dextrose         PRN Meds:  sodium chloride flush, sodium chloride flush, morphine **OR** morphine, HYDROcodone 5 mg - acetaminophen **OR** HYDROcodone 5 mg - acetaminophen, sodium chloride flush, promethazine **OR** ondansetron, polyethylene glycol, acetaminophen **OR** acetaminophen, calcium carbonate, glucose, dextrose, glucagon (rDNA), dextrose, traZODone      Assessment:     53 yo man with obesity (BMI 57), DM, HTN, LBP (back surg)  Bilateral pedal charcot neuroarthropathy     Pt had R foot reconstructive surg -   9/3/20 Pantalar arthrodesis with BETO  10/22 MT osteotomies, hammertoe correction, hallux inter-phalanx fusion     Pt had cast in place, had blood strickthrough  Denies pain, fever / chills / sweats or other sx     Seen by Ortho 2/9 - cast removed, evidence infection, referred to [facility] 2/10 - evidence 'gross infection', soft tissue and bone debridement  GS 1+WBC, no organism     IMP/  Deep space R foot infection with hardware   - OR 2/10   - cult - heavy E cloacae    Plan:     Cont daptomycin + cefepime  Will f/u on cult - E cloacae to date  Prolonged course iv antibiotics, see MARIELY     Discussed with pt, RN  Uzma Gross MD     INFUSION ORDERS:  Daptomycin 1 gm iv daily through 4/7 (8 weeks)  Cefepime 2 gm iv q 12 through 4/7 (8 weeks)  - First dose given in hospital  - R PICC  - placed 2/11/21  - Disposition / date discharge - home / 2/12/21 - Check CBC w diff, CMP, ESR, CRP, CK every Mon or Tue - FAX result to [phone]  - Call with antibiotic / infusion issues, [phone]  - No f/u in outpatient ID office necessary

## 2021-02-12 NOTE — PROGRESS NOTES
Ortho Progress Note     POD 2 s/p I&D right foot. He has no complaints and is sleeping in bed.     Dressings clean, dry and intact -- wound vac in place. Neurovascularly intact.       Continue NWB on operative extremity. Continue PT/OT. He will need wound vac at discharge. IV antibiotics per ID. D/c planning per social work -- arranging home care for wound vac and IV antibiotics. Okay for discharge from an ortho standpoint once home care has been arranged.

## 2021-02-12 NOTE — PROGRESS NOTES
Ortho Progress Note    POD 1 s/p I&D right foot. He has no complaints. He had wound vac applied and PICC line placed today. Dressings clean, dry and intact. Neurovascularly intact. Continue NWB on operative extremity. Continue PT/OT. He will need wound vac at discharge. IV antibiotics per ID. D/c planning per social work -- arranging home care for wound vac and IV antibiotics.

## 2021-02-12 NOTE — CARE COORDINATION
SW following Pt today. Pt being DC home today with wife. Pt's [employer] wound vac approved yesterday. Please see Binu Gonzalez LPN with [facility] note from today for Home Care and Home IV arrangements. Both numbers placed on Pt's AVS. ALIREZA called [facility] in [facility] and advised of cancellation of services.      Candida Snyder, [place]  Case Management  [phone]

## 2021-02-13 RX ORDER — TRAZODONE HYDROCHLORIDE 50 MG/1
TABLET ORAL
Qty: 90 TABLET | Refills: 0 | Status: SHIPPED | OUTPATIENT
Start: 2021-02-13 | End: 2022-03-08

## 2021-02-13 NOTE — PROGRESS NOTES
Discharge instructions given reguarding medications, NPWT, home health care, follow up appts and s/s of infection. Patient and patients wife both verbalized understanding. Patient transported to main lobby per w/c where wife waiting to provide transport home.

## 2021-02-15 ENCOUNTER — LAB REQUISITION (OUTPATIENT)
Dept: LAB | Facility: HOSPITAL | Age: 51
End: 2021-02-15

## 2021-02-15 DIAGNOSIS — M86.271 SUBACUTE OSTEOMYELITIS, RIGHT ANKLE AND FOOT (HCC): ICD-10-CM

## 2021-02-15 DIAGNOSIS — L03.115 CELLULITIS OF RIGHT LOWER LIMB: ICD-10-CM

## 2021-02-15 DIAGNOSIS — T81.42XA INFECTION FOLLOWING A PROCEDURE, DEEP INCISIONAL SURGICAL SITE, INITIAL ENCOUNTER: ICD-10-CM

## 2021-02-15 DIAGNOSIS — L02.611 CUTANEOUS ABSCESS OF RIGHT FOOT: ICD-10-CM

## 2021-02-15 LAB
ALBUMIN SERPL-MCNC: 3.9 G/DL (ref 3.5–5.2)
ALBUMIN/GLOB SERPL: 1.4 G/DL
ALP SERPL-CCNC: 72 U/L (ref 39–117)
ALT SERPL W P-5'-P-CCNC: 63 U/L (ref 1–41)
ANAEROBIC CULTURE: ABNORMAL
ANAEROBIC CULTURE: ABNORMAL
ANION GAP SERPL CALCULATED.3IONS-SCNC: 10 MMOL/L (ref 5–15)
AST SERPL-CCNC: 59 U/L (ref 1–40)
BASOPHILS # BLD AUTO: 0.06 10*3/MM3 (ref 0–0.2)
BASOPHILS NFR BLD AUTO: 1 % (ref 0–1.5)
BILIRUB SERPL-MCNC: 0.3 MG/DL (ref 0–1.2)
BUN SERPL-MCNC: 11 MG/DL (ref 6–20)
BUN/CREAT SERPL: 17.7 (ref 7–25)
CALCIUM SPEC-SCNC: 10 MG/DL (ref 8.6–10.5)
CHLORIDE SERPL-SCNC: 101 MMOL/L (ref 98–107)
CO2 SERPL-SCNC: 25 MMOL/L (ref 22–29)
CREAT SERPL-MCNC: 0.62 MG/DL (ref 0.76–1.27)
CRP SERPL-MCNC: 0.64 MG/DL (ref 0–0.5)
DEPRECATED RDW RBC AUTO: 47.4 FL (ref 37–54)
EOSINOPHIL # BLD AUTO: 0.34 10*3/MM3 (ref 0–0.4)
EOSINOPHIL NFR BLD AUTO: 5.6 % (ref 0.3–6.2)
ERYTHROCYTE [DISTWIDTH] IN BLOOD BY AUTOMATED COUNT: 14.7 % (ref 12.3–15.4)
ERYTHROCYTE [SEDIMENTATION RATE] IN BLOOD: 16 MM/HR (ref 0–15)
GFR SERPL CREATININE-BSD FRML MDRD: 138 ML/MIN/1.73
GLOBULIN UR ELPH-MCNC: 2.8 GM/DL
GLUCOSE SERPL-MCNC: 185 MG/DL (ref 65–99)
GRAM STAIN RESULT: ABNORMAL
HCT VFR BLD AUTO: 42.5 % (ref 37.5–51)
HGB BLD-MCNC: 13.6 G/DL (ref 13–17.7)
IMM GRANULOCYTES # BLD AUTO: 0.04 10*3/MM3 (ref 0–0.05)
IMM GRANULOCYTES NFR BLD AUTO: 0.7 % (ref 0–0.5)
LYMPHOCYTES # BLD AUTO: 1.36 10*3/MM3 (ref 0.7–3.1)
LYMPHOCYTES NFR BLD AUTO: 22.4 % (ref 19.6–45.3)
MCH RBC QN AUTO: 28.1 PG (ref 26.6–33)
MCHC RBC AUTO-ENTMCNC: 32 G/DL (ref 31.5–35.7)
MCV RBC AUTO: 87.8 FL (ref 79–97)
MONOCYTES # BLD AUTO: 0.67 10*3/MM3 (ref 0.1–0.9)
MONOCYTES NFR BLD AUTO: 11.1 % (ref 5–12)
NEUTROPHILS NFR BLD AUTO: 3.59 10*3/MM3 (ref 1.7–7)
NEUTROPHILS NFR BLD AUTO: 59.2 % (ref 42.7–76)
NRBC BLD AUTO-RTO: 0 /100 WBC (ref 0–0.2)
ORGANISM: ABNORMAL
PLATELET # BLD AUTO: 216 10*3/MM3 (ref 140–450)
PMV BLD AUTO: 12.9 FL (ref 6–12)
POTASSIUM SERPL-SCNC: 4 MMOL/L (ref 3.5–5.2)
PROT SERPL-MCNC: 6.7 G/DL (ref 6–8.5)
RBC # BLD AUTO: 4.84 10*6/MM3 (ref 4.14–5.8)
SODIUM SERPL-SCNC: 136 MMOL/L (ref 136–145)
WBC # BLD AUTO: 6.06 10*3/MM3 (ref 3.4–10.8)
WOUND/ABSCESS: ABNORMAL

## 2021-02-15 PROCEDURE — 86140 C-REACTIVE PROTEIN: CPT | Performed by: INTERNAL MEDICINE

## 2021-02-15 PROCEDURE — 85025 COMPLETE CBC W/AUTO DIFF WBC: CPT | Performed by: INTERNAL MEDICINE

## 2021-02-15 PROCEDURE — 85652 RBC SED RATE AUTOMATED: CPT | Performed by: INTERNAL MEDICINE

## 2021-02-15 PROCEDURE — 80053 COMPREHEN METABOLIC PANEL: CPT | Performed by: INTERNAL MEDICINE

## 2021-02-22 LAB
ALBUMIN SERPL-MCNC: 3.7 G/DL
ALP BLD-CCNC: 70 U/L
ALT SERPL-CCNC: 81 U/L
ANION GAP SERPL CALCULATED.3IONS-SCNC: NORMAL MMOL/L
AST SERPL-CCNC: 44 U/L
BASOPHILS ABSOLUTE: 0.06 /ΜL
BASOPHILS RELATIVE PERCENT: 1 %
BILIRUB SERPL-MCNC: 0.3 MG/DL (ref 0.1–1.4)
BUN BLDV-MCNC: 19 MG/DL
C-REACTIVE PROTEIN: 0.5
CALCIUM SERPL-MCNC: 8.8 MG/DL
CHLORIDE BLD-SCNC: 103 MMOL/L
CO2: 24 MMOL/L
CREAT SERPL-MCNC: 0.8 MG/DL
EOSINOPHILS ABSOLUTE: 0.33 /ΜL
EOSINOPHILS RELATIVE PERCENT: 6 %
GFR CALCULATED: NORMAL
GLUCOSE BLD-MCNC: 182 MG/DL
HCT VFR BLD CALC: 42.5 % (ref 41–53)
HEMOGLOBIN: 13.9 G/DL (ref 13.5–17.5)
LYMPHOCYTES ABSOLUTE: 1.8 /ΜL
LYMPHOCYTES RELATIVE PERCENT: 35 %
MCH RBC QN AUTO: 28 PG
MCHC RBC AUTO-ENTMCNC: 33 G/DL
MCV RBC AUTO: 85 FL
MONOCYTES ABSOLUTE: 0.76 /ΜL
MONOCYTES RELATIVE PERCENT: 15 %
NEUTROPHILS ABSOLUTE: 2.22 /ΜL
NEUTROPHILS RELATIVE PERCENT: 43 %
PLATELET # BLD: 224 K/ΜL
PMV BLD AUTO: NORMAL FL
POTASSIUM SERPL-SCNC: 3.9 MMOL/L
RBC # BLD: 4.98 10^6/ΜL
SEDIMENTATION RATE, ERYTHROCYTE: 5
SODIUM BLD-SCNC: 137 MMOL/L
TOTAL CK: 213 U/L
TOTAL PROTEIN: 7.7
WBC # BLD: 5.2 10^3/ML

## 2021-02-23 ENCOUNTER — TELEPHONE (OUTPATIENT)
Dept: INFECTIOUS DISEASES | Age: 51
End: 2021-02-23

## 2021-03-01 ENCOUNTER — LAB REQUISITION (OUTPATIENT)
Dept: LAB | Facility: HOSPITAL | Age: 51
End: 2021-03-01

## 2021-03-01 DIAGNOSIS — L02.611 CUTANEOUS ABSCESS OF RIGHT FOOT: ICD-10-CM

## 2021-03-01 DIAGNOSIS — T81.42XA INFECTION FOLLOWING A PROCEDURE, DEEP INCISIONAL SURGICAL SITE, INITIAL ENCOUNTER: ICD-10-CM

## 2021-03-01 LAB
ALBUMIN SERPL-MCNC: 4 G/DL (ref 3.5–5.2)
ALBUMIN/GLOB SERPL: 1.4 G/DL
ALP SERPL-CCNC: 64 U/L (ref 39–117)
ALT SERPL W P-5'-P-CCNC: 77 U/L (ref 1–41)
ANION GAP SERPL CALCULATED.3IONS-SCNC: 11 MMOL/L (ref 5–15)
AST SERPL-CCNC: 45 U/L (ref 1–40)
BASOPHILS # BLD AUTO: 0.06 10*3/MM3 (ref 0–0.2)
BASOPHILS NFR BLD AUTO: 1.2 % (ref 0–1.5)
BILIRUB SERPL-MCNC: 0.2 MG/DL (ref 0–1.2)
BUN SERPL-MCNC: 15 MG/DL (ref 6–20)
BUN/CREAT SERPL: 22.4 (ref 7–25)
CALCIUM SPEC-SCNC: 9.7 MG/DL (ref 8.6–10.5)
CHLORIDE SERPL-SCNC: 100 MMOL/L (ref 98–107)
CK SERPL-CCNC: 164 U/L (ref 20–200)
CO2 SERPL-SCNC: 23 MMOL/L (ref 22–29)
CREAT SERPL-MCNC: 0.67 MG/DL (ref 0.76–1.27)
CRP SERPL-MCNC: <0.3 MG/DL (ref 0–0.5)
DEPRECATED RDW RBC AUTO: 45.4 FL (ref 37–54)
EOSINOPHIL # BLD AUTO: 0.34 10*3/MM3 (ref 0–0.4)
EOSINOPHIL NFR BLD AUTO: 7 % (ref 0.3–6.2)
ERYTHROCYTE [DISTWIDTH] IN BLOOD BY AUTOMATED COUNT: 14.6 % (ref 12.3–15.4)
ERYTHROCYTE [SEDIMENTATION RATE] IN BLOOD: 8 MM/HR (ref 0–15)
GFR SERPL CREATININE-BSD FRML MDRD: 127 ML/MIN/1.73
GLOBULIN UR ELPH-MCNC: 2.8 GM/DL
GLUCOSE SERPL-MCNC: 229 MG/DL (ref 65–99)
HCT VFR BLD AUTO: 42.8 % (ref 37.5–51)
HGB BLD-MCNC: 14 G/DL (ref 13–17.7)
IMM GRANULOCYTES # BLD AUTO: 0.01 10*3/MM3 (ref 0–0.05)
IMM GRANULOCYTES NFR BLD AUTO: 0.2 % (ref 0–0.5)
LYMPHOCYTES # BLD AUTO: 1.61 10*3/MM3 (ref 0.7–3.1)
LYMPHOCYTES NFR BLD AUTO: 33.3 % (ref 19.6–45.3)
MCH RBC QN AUTO: 28.2 PG (ref 26.6–33)
MCHC RBC AUTO-ENTMCNC: 32.7 G/DL (ref 31.5–35.7)
MCV RBC AUTO: 86.1 FL (ref 79–97)
MONOCYTES # BLD AUTO: 0.58 10*3/MM3 (ref 0.1–0.9)
MONOCYTES NFR BLD AUTO: 12 % (ref 5–12)
NEUTROPHILS NFR BLD AUTO: 2.24 10*3/MM3 (ref 1.7–7)
NEUTROPHILS NFR BLD AUTO: 46.3 % (ref 42.7–76)
NRBC BLD AUTO-RTO: 0 /100 WBC (ref 0–0.2)
PLATELET # BLD AUTO: 222 10*3/MM3 (ref 140–450)
PMV BLD AUTO: 12.7 FL (ref 6–12)
POTASSIUM SERPL-SCNC: 3.7 MMOL/L (ref 3.5–5.2)
PROT SERPL-MCNC: 6.8 G/DL (ref 6–8.5)
RBC # BLD AUTO: 4.97 10*6/MM3 (ref 4.14–5.8)
SODIUM SERPL-SCNC: 134 MMOL/L (ref 136–145)
WBC # BLD AUTO: 4.84 10*3/MM3 (ref 3.4–10.8)

## 2021-03-01 PROCEDURE — 80053 COMPREHEN METABOLIC PANEL: CPT | Performed by: ORTHOPAEDIC SURGERY

## 2021-03-01 PROCEDURE — 82550 ASSAY OF CK (CPK): CPT | Performed by: ORTHOPAEDIC SURGERY

## 2021-03-01 PROCEDURE — 85025 COMPLETE CBC W/AUTO DIFF WBC: CPT | Performed by: ORTHOPAEDIC SURGERY

## 2021-03-01 PROCEDURE — 85652 RBC SED RATE AUTOMATED: CPT | Performed by: ORTHOPAEDIC SURGERY

## 2021-03-01 PROCEDURE — 86140 C-REACTIVE PROTEIN: CPT | Performed by: ORTHOPAEDIC SURGERY

## 2021-03-08 LAB
ALBUMIN SERPL-MCNC: 3.6 G/DL
ALP BLD-CCNC: 66 U/L
ALT SERPL-CCNC: 37 U/L
ANION GAP SERPL CALCULATED.3IONS-SCNC: NORMAL MMOL/L
AST SERPL-CCNC: 40 U/L
BILIRUB SERPL-MCNC: 0.4 MG/DL (ref 0.1–1.4)
BUN BLDV-MCNC: 17 MG/DL
CALCIUM SERPL-MCNC: 8.8 MG/DL
CHLORIDE BLD-SCNC: 102 MMOL/L
CO2: 25 MMOL/L
CREAT SERPL-MCNC: 0.8 MG/DL
GFR CALCULATED: NORMAL
GLUCOSE BLD-MCNC: 187 MG/DL
POTASSIUM SERPL-SCNC: 3.7 MMOL/L
SODIUM BLD-SCNC: 138 MMOL/L
TOTAL PROTEIN: 7.4

## 2021-03-10 LAB
BASOPHILS ABSOLUTE: 0.04 /ΜL
BASOPHILS RELATIVE PERCENT: 1 %
EOSINOPHILS ABSOLUTE: 0.26 /ΜL
EOSINOPHILS RELATIVE PERCENT: 8 %
HCT VFR BLD CALC: 42 % (ref 41–53)
HEMOGLOBIN: 13.8 G/DL (ref 13.5–17.5)
LYMPHOCYTES ABSOLUTE: 1.32 /ΜL
LYMPHOCYTES RELATIVE PERCENT: NORMAL
MCH RBC QN AUTO: 28 PG
MCHC RBC AUTO-ENTMCNC: 33 G/DL
MCV RBC AUTO: 86 FL
MONOCYTES ABSOLUTE: 0.48 /ΜL
MONOCYTES RELATIVE PERCENT: 13 %
NEUTROPHILS ABSOLUTE: 1.08 /ΜL
NEUTROPHILS RELATIVE PERCENT: NORMAL
PDW BLD-RTO: 14.8 %
PLATELET # BLD: 215 K/ΜL
PMV BLD AUTO: NORMAL FL
RBC # BLD: 4.9 10^6/ΜL
WBC # BLD: 3.1 10^3/ML

## 2021-03-11 ENCOUNTER — TELEPHONE (OUTPATIENT)
Dept: INFECTIOUS DISEASES | Age: 51
End: 2021-03-11

## 2021-03-11 NOTE — TELEPHONE ENCOUNTER
Pt returned your call, states his foot wound is almost completely healed. They have removed the wound vac. He is feeling well and has no complaints.

## 2021-03-15 ENCOUNTER — LAB REQUISITION (OUTPATIENT)
Dept: LAB | Facility: HOSPITAL | Age: 51
End: 2021-03-15

## 2021-03-15 DIAGNOSIS — L03.115 CELLULITIS OF RIGHT LOWER LIMB: ICD-10-CM

## 2021-03-15 DIAGNOSIS — L02.611 CUTANEOUS ABSCESS OF RIGHT FOOT: ICD-10-CM

## 2021-03-15 DIAGNOSIS — T81.42XA INFECTION FOLLOWING A PROCEDURE, DEEP INCISIONAL SURGICAL SITE, INITIAL ENCOUNTER: ICD-10-CM

## 2021-03-15 LAB
ALBUMIN SERPL-MCNC: 4.1 G/DL
ALBUMIN SERPL-MCNC: 4.1 G/DL (ref 3.5–5.2)
ALBUMIN/GLOB SERPL: 1.4 G/DL
ALP BLD-CCNC: 61 U/L
ALP SERPL-CCNC: 61 U/L (ref 39–117)
ALT SERPL W P-5'-P-CCNC: 64 U/L (ref 1–41)
ALT SERPL-CCNC: 64 U/L
ANION GAP SERPL CALCULATED.3IONS-SCNC: 13 MMOL/L (ref 5–15)
ANION GAP SERPL CALCULATED.3IONS-SCNC: NORMAL MMOL/L
AST SERPL-CCNC: 45 U/L
AST SERPL-CCNC: 45 U/L (ref 1–40)
BASOPHILS # BLD AUTO: 0.07 10*3/MM3 (ref 0–0.2)
BASOPHILS ABSOLUTE: 0.07 /ΜL
BASOPHILS NFR BLD AUTO: 1.2 % (ref 0–1.5)
BASOPHILS RELATIVE PERCENT: 1.2 %
BILIRUB SERPL-MCNC: 0.3 MG/DL (ref 0.1–1.4)
BILIRUB SERPL-MCNC: 0.3 MG/DL (ref 0–1.2)
BUN BLDV-MCNC: 17 MG/DL
BUN SERPL-MCNC: 17 MG/DL (ref 6–20)
BUN/CREAT SERPL: 28.3 (ref 7–25)
C-REACTIVE PROTEIN: 0.33
CALCIUM SERPL-MCNC: 10.1 MG/DL
CALCIUM SPEC-SCNC: 10.1 MG/DL (ref 8.6–10.5)
CHLORIDE BLD-SCNC: 99 MMOL/L
CHLORIDE SERPL-SCNC: 99 MMOL/L (ref 98–107)
CK SERPL-CCNC: 189 U/L (ref 20–200)
CO2 SERPL-SCNC: 25 MMOL/L (ref 22–29)
CO2: 25 MMOL/L
CREAT SERPL-MCNC: 0.6 MG/DL
CREAT SERPL-MCNC: 0.6 MG/DL (ref 0.76–1.27)
CRP SERPL-MCNC: 0.33 MG/DL (ref 0–0.5)
DEPRECATED RDW RBC AUTO: 45.7 FL (ref 37–54)
EOSINOPHIL # BLD AUTO: 0.26 10*3/MM3 (ref 0–0.4)
EOSINOPHIL NFR BLD AUTO: 4.6 % (ref 0.3–6.2)
EOSINOPHILS ABSOLUTE: 0.26 /ΜL
EOSINOPHILS RELATIVE PERCENT: 4.6 %
ERYTHROCYTE [DISTWIDTH] IN BLOOD BY AUTOMATED COUNT: 14.3 % (ref 12.3–15.4)
ERYTHROCYTE [SEDIMENTATION RATE] IN BLOOD: 10 MM/HR (ref 0–15)
FUNGUS (MYCOLOGY) CULTURE: NORMAL
FUNGUS (MYCOLOGY) CULTURE: NORMAL
FUNGUS STAIN: NORMAL
FUNGUS STAIN: NORMAL
GFR CALCULATED: NORMAL
GFR SERPL CREATININE-BSD FRML MDRD: 144 ML/MIN/1.73
GLOBULIN UR ELPH-MCNC: 3 GM/DL
GLUCOSE BLD-MCNC: 130 MG/DL
GLUCOSE SERPL-MCNC: 130 MG/DL (ref 65–99)
HCT VFR BLD AUTO: 43.2 % (ref 37.5–51)
HCT VFR BLD CALC: 43.2 % (ref 41–53)
HEMOGLOBIN: 13.8 G/DL (ref 13.5–17.5)
HGB BLD-MCNC: 13.8 G/DL (ref 13–17.7)
IMM GRANULOCYTES # BLD AUTO: 0.02 10*3/MM3 (ref 0–0.05)
IMM GRANULOCYTES NFR BLD AUTO: 0.4 % (ref 0–0.5)
LYMPHOCYTES # BLD AUTO: 1.69 10*3/MM3 (ref 0.7–3.1)
LYMPHOCYTES ABSOLUTE: 1.69 /ΜL
LYMPHOCYTES NFR BLD AUTO: 29.8 % (ref 19.6–45.3)
LYMPHOCYTES RELATIVE PERCENT: 29.8 %
MCH RBC QN AUTO: 27.9 PG
MCH RBC QN AUTO: 27.9 PG (ref 26.6–33)
MCHC RBC AUTO-ENTMCNC: 31.9 G/DL
MCHC RBC AUTO-ENTMCNC: 31.9 G/DL (ref 31.5–35.7)
MCV RBC AUTO: 87.3 FL
MCV RBC AUTO: 87.3 FL (ref 79–97)
MONOCYTES # BLD AUTO: 0.8 10*3/MM3 (ref 0.1–0.9)
MONOCYTES ABSOLUTE: 0.8 /ΜL
MONOCYTES NFR BLD AUTO: 14.1 % (ref 5–12)
MONOCYTES RELATIVE PERCENT: 14.1 %
NEUTROPHILS ABSOLUTE: 2.83 /ΜL
NEUTROPHILS NFR BLD AUTO: 2.83 10*3/MM3 (ref 1.7–7)
NEUTROPHILS NFR BLD AUTO: 49.9 % (ref 42.7–76)
NEUTROPHILS RELATIVE PERCENT: 49.9 %
NRBC BLD AUTO-RTO: 0 /100 WBC (ref 0–0.2)
PDW BLD-RTO: 14.3 %
PLATELET # BLD AUTO: 243 10*3/MM3 (ref 140–450)
PLATELET # BLD: 243 K/ΜL
PMV BLD AUTO: 12.5 FL
PMV BLD AUTO: 12.5 FL (ref 6–12)
POTASSIUM SERPL-SCNC: 4.1 MMOL/L
POTASSIUM SERPL-SCNC: 4.1 MMOL/L (ref 3.5–5.2)
PROT SERPL-MCNC: 7.1 G/DL (ref 6–8.5)
RBC # BLD AUTO: 4.95 10*6/MM3 (ref 4.14–5.8)
RBC # BLD: 4.95 10^6/ΜL
SODIUM BLD-SCNC: 137 MMOL/L
SODIUM SERPL-SCNC: 137 MMOL/L (ref 136–145)
TOTAL CK: 189 U/L
TOTAL PROTEIN: 7.1
WBC # BLD AUTO: 5.67 10*3/MM3 (ref 3.4–10.8)
WBC # BLD: 5.67 10^3/ML

## 2021-03-15 PROCEDURE — 80053 COMPREHEN METABOLIC PANEL: CPT | Performed by: INTERNAL MEDICINE

## 2021-03-15 PROCEDURE — 85652 RBC SED RATE AUTOMATED: CPT | Performed by: INTERNAL MEDICINE

## 2021-03-15 PROCEDURE — 86140 C-REACTIVE PROTEIN: CPT | Performed by: INTERNAL MEDICINE

## 2021-03-15 PROCEDURE — 85025 COMPLETE CBC W/AUTO DIFF WBC: CPT | Performed by: INTERNAL MEDICINE

## 2021-03-15 PROCEDURE — 82550 ASSAY OF CK (CPK): CPT | Performed by: INTERNAL MEDICINE

## 2021-03-15 NOTE — TELEPHONE ENCOUNTER
Reviewed record --    R foot infection, has hardware yet not at location of wound  I&D 2/10, cult E cloacae  S/p over 1 mo iv antibiotic, wound healed    Called pt - he report wound is superficial   May be able to end antibiotic earlier.   Will confer with Dr Conti Feeling

## 2021-03-15 NOTE — TELEPHONE ENCOUNTER
Patient called back. Is eager to have his picc line removed so he can start working out. Patient said the wound vac had been removed and the foot is healed up. He is ok taking oral antibiotics.

## 2021-03-18 ENCOUNTER — TELEPHONE (OUTPATIENT)
Dept: INFECTIOUS DISEASES | Age: 51
End: 2021-03-18

## 2021-03-19 NOTE — TELEPHONE ENCOUNTER
Spoke with Aby Lyles and with pt that IV antibiotics will be discontinued on 3/24/21 with verbalization of understanding and left VM at THE [facility] clinical manager \"Lisa\" to discontinue IV antibiotics on 3/24/21 with our call back #.

## 2021-03-25 DIAGNOSIS — E11.9 TYPE 2 DIABETES MELLITUS WITHOUT COMPLICATION, WITHOUT LONG-TERM CURRENT USE OF INSULIN (HCC): ICD-10-CM

## 2021-03-25 RX ORDER — METFORMIN HYDROCHLORIDE 500 MG/1
1000 TABLET, EXTENDED RELEASE ORAL
Qty: 180 TABLET | Refills: 0 | Status: SHIPPED | OUTPATIENT
Start: 2021-03-25 | End: 2021-06-20

## 2021-03-26 ENCOUNTER — TELEPHONE (OUTPATIENT)
Dept: INFECTIOUS DISEASES | Age: 51
End: 2021-03-26

## 2021-03-26 NOTE — TELEPHONE ENCOUNTER
Pt called in to ask when he would be able to DC antibiotics. Our records indicate we gave VO to [address] and [facility] to DC on 3/24. Pt was unaware since he was not contacted by his nurse so he thought there was a change in plans after his discussion with Dr. Rey Hercules to Enedina Mann (pharmacist) and gave VO.   for [facility]) to have nurse go out and pull PICC

## 2021-03-30 LAB
AFB CULTURE (MYCOBACTERIA): NORMAL
AFB CULTURE (MYCOBACTERIA): NORMAL
AFB SMEAR: NORMAL
AFB SMEAR: NORMAL

## 2021-04-08 DIAGNOSIS — I10 ESSENTIAL HYPERTENSION: ICD-10-CM

## 2021-04-08 RX ORDER — BENAZEPRIL HYDROCHLORIDE 10 MG/1
TABLET ORAL
Qty: 90 TABLET | Refills: 0 | Status: SHIPPED | OUTPATIENT
Start: 2021-04-08 | End: 2021-07-01

## 2021-06-19 DIAGNOSIS — E11.9 TYPE 2 DIABETES MELLITUS WITHOUT COMPLICATION, WITHOUT LONG-TERM CURRENT USE OF INSULIN (HCC): ICD-10-CM

## 2021-06-20 RX ORDER — METFORMIN HYDROCHLORIDE 500 MG/1
1000 TABLET, EXTENDED RELEASE ORAL
Qty: 60 TABLET | Refills: 0 | Status: SHIPPED | OUTPATIENT
Start: 2021-06-20 | End: 2021-07-29 | Stop reason: SDUPTHER

## 2021-07-01 DIAGNOSIS — I10 ESSENTIAL HYPERTENSION: ICD-10-CM

## 2021-07-01 RX ORDER — BENAZEPRIL HYDROCHLORIDE 10 MG/1
TABLET ORAL
Qty: 30 TABLET | Refills: 0 | Status: SHIPPED | OUTPATIENT
Start: 2021-07-01 | End: 2021-07-29 | Stop reason: SDUPTHER

## 2021-07-27 DIAGNOSIS — I10 ESSENTIAL HYPERTENSION: ICD-10-CM

## 2021-07-27 RX ORDER — BENAZEPRIL HYDROCHLORIDE 10 MG/1
10 TABLET ORAL DAILY
Qty: 30 TABLET | Refills: 0 | Status: CANCELLED | OUTPATIENT
Start: 2021-07-27

## 2021-07-29 ENCOUNTER — OFFICE VISIT (OUTPATIENT)
Dept: FAMILY MEDICINE CLINIC | Facility: CLINIC | Age: 51
End: 2021-07-29

## 2021-07-29 VITALS
OXYGEN SATURATION: 98 % | HEART RATE: 86 BPM | TEMPERATURE: 97.5 F | WEIGHT: 315 LBS | BODY MASS INDEX: 41.75 KG/M2 | DIASTOLIC BLOOD PRESSURE: 80 MMHG | HEIGHT: 73 IN | SYSTOLIC BLOOD PRESSURE: 142 MMHG

## 2021-07-29 DIAGNOSIS — I10 ESSENTIAL HYPERTENSION: ICD-10-CM

## 2021-07-29 DIAGNOSIS — E11.9 TYPE 2 DIABETES MELLITUS WITHOUT COMPLICATION, WITHOUT LONG-TERM CURRENT USE OF INSULIN (HCC): Primary | ICD-10-CM

## 2021-07-29 PROBLEM — M14.671 CHARCOT ANKLE, RIGHT: Status: ACTIVE | Noted: 2021-02-11

## 2021-07-29 PROBLEM — M86.271 SUBACUTE OSTEOMYELITIS OF RIGHT FOOT (HCC): Status: ACTIVE | Noted: 2021-02-11

## 2021-07-29 LAB
EXPIRATION DATE: ABNORMAL
HBA1C MFR BLD: 9.6 %
Lab: ABNORMAL

## 2021-07-29 PROCEDURE — 83036 HEMOGLOBIN GLYCOSYLATED A1C: CPT | Performed by: FAMILY MEDICINE

## 2021-07-29 PROCEDURE — 99214 OFFICE O/P EST MOD 30 MIN: CPT | Performed by: FAMILY MEDICINE

## 2021-07-29 RX ORDER — ASPIRIN 81 MG/1
81 TABLET ORAL DAILY
Qty: 100 TABLET | Refills: 0
Start: 2021-07-29

## 2021-07-29 RX ORDER — BENAZEPRIL HYDROCHLORIDE 10 MG/1
10 TABLET ORAL DAILY
Qty: 90 TABLET | Refills: 1 | Status: SHIPPED | OUTPATIENT
Start: 2021-07-29 | End: 2022-01-22

## 2021-07-29 RX ORDER — TRIAMTERENE AND HYDROCHLOROTHIAZIDE 37.5; 25 MG/1; MG/1
1 TABLET ORAL DAILY
Qty: 90 TABLET | Refills: 1 | Status: SHIPPED | OUTPATIENT
Start: 2021-07-29 | End: 2022-03-08 | Stop reason: SDUPTHER

## 2021-07-29 RX ORDER — METFORMIN HYDROCHLORIDE 500 MG/1
1000 TABLET, EXTENDED RELEASE ORAL
Qty: 180 TABLET | Refills: 1 | Status: SHIPPED | OUTPATIENT
Start: 2021-07-29 | End: 2021-11-08 | Stop reason: SDUPTHER

## 2021-07-30 NOTE — PROGRESS NOTES
"Chief Complaint  Hypertension    Subjective          Ming Swenson presents to [facility]  Ming has been through 3 surgeries with his right foot he was bedbound for a long time and required antibiotics at home.  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  He has been losing weight is lost about 30 pounds in the last 6 months.  He was able to go swimming recently which he found very helpful.  He is out of his walking boot now and would like to come off of Xarelto, he was told by his surgeon he could do so.    Hypertension  This is a chronic problem. The current episode started more than 1 year ago. The problem is unchanged. The problem is controlled. Pertinent negatives include no chest pain or shortness of breath. Risk factors for coronary artery disease include obesity and male gender. Past treatments include ACE inhibitors and diuretics. Current antihypertension treatment includes ACE inhibitors, diuretics and lifestyle changes. The current treatment provides significant improvement.   Diabetes  He presents for his follow-up diabetic visit. He has type 2 diabetes mellitus. His disease course has been stable. There are no hypoglycemic associated symptoms. Pertinent negatives for diabetes include no chest pain. There are no hypoglycemic complications. Symptoms are stable. He participates in exercise weekly. An ACE inhibitor/angiotensin II receptor blocker is being taken.       Objective   Vital Signs:   /80   Pulse 86   Temp 97.5 °F (36.4 °C)   Ht 185.4 cm (73\")   Wt (!) 204 kg (449 lb)   SpO2 98%   BMI 59.24 kg/m²     Physical Exam  Constitutional:       Appearance: He is obese.   HENT:      Head: Normocephalic and atraumatic.   Eyes:      Conjunctiva/sclera: Conjunctivae normal.      Pupils: Pupils are equal, round, and reactive to light.   Cardiovascular:      Rate and Rhythm: Normal rate and regular rhythm.      Heart " sounds: Normal heart sounds.   Pulmonary:      Effort: Pulmonary effort is normal.      Breath sounds: Normal breath sounds.   Musculoskeletal:         General: Normal range of motion.      Cervical back: Neck supple.      Comments: Right foot is in a surgical shoe  He ambulates at home with him for long distances he uses a wheelchair   Neurological:      Mental Status: He is alert. Mental status is at baseline.        Result Review :                 Assessment and Plan    Diagnoses and all orders for this visit:    1. Type 2 diabetes mellitus without complication, without long-term current use of insulin (CMS/[facility]) (Primary)  -     POC Glycosylated Hemoglobin (Hb A1C)  -     metFORMIN ER (GLUCOPHAGE-XR) 500 MG 24 hr tablet; Take 2 tablets by mouth Daily With Breakfast.  Dispense: 180 tablet; Refill: 1    2. Essential hypertension  -     benazepril (LOTENSIN) 10 MG tablet; Take 1 tablet by mouth Daily.  Dispense: 90 tablet; Refill: 1  -     triamterene-hydrochlorothiazide (MAXZIDE-25) 37.5-25 MG per tablet; Take 1 tablet by mouth Daily.  Dispense: 90 tablet; Refill: 1    Other orders  -     aspirin (aspirin) 81 MG EC tablet; Take 1 tablet by mouth Daily.  Dispense: 100 tablet; Refill: 0        Follow Up   Return in about 3 months (around 10/29/2021) for Diabetes with A1C, Keep Appt..  Patient was given instructions and counseling regarding his condition or for health maintenance advice. Please see specific information pulled into the AVS if appropriate.     Discontinue Xarelto watch for signs of DVT start aspirin 81 mg daily, increase Metformin to 2 daily continue weight loss and exercise follow-up in 3 months or as needed

## 2021-11-08 ENCOUNTER — OFFICE VISIT (OUTPATIENT)
Dept: FAMILY MEDICINE CLINIC | Facility: CLINIC | Age: 51
End: 2021-11-08

## 2021-11-08 VITALS
HEART RATE: 87 BPM | DIASTOLIC BLOOD PRESSURE: 74 MMHG | OXYGEN SATURATION: 98 % | BODY MASS INDEX: 41.75 KG/M2 | RESPIRATION RATE: 16 BRPM | HEIGHT: 73 IN | TEMPERATURE: 98.6 F | SYSTOLIC BLOOD PRESSURE: 114 MMHG | WEIGHT: 315 LBS

## 2021-11-08 DIAGNOSIS — E11.9 TYPE 2 DIABETES MELLITUS WITHOUT COMPLICATION, WITHOUT LONG-TERM CURRENT USE OF INSULIN (HCC): Primary | ICD-10-CM

## 2021-11-08 DIAGNOSIS — I10 PRIMARY HYPERTENSION: ICD-10-CM

## 2021-11-08 LAB
EXPIRATION DATE: ABNORMAL
HBA1C MFR BLD: 9.5 %
Lab: 1036

## 2021-11-08 PROCEDURE — 99214 OFFICE O/P EST MOD 30 MIN: CPT | Performed by: FAMILY MEDICINE

## 2021-11-08 PROCEDURE — 83036 HEMOGLOBIN GLYCOSYLATED A1C: CPT | Performed by: FAMILY MEDICINE

## 2021-11-08 PROCEDURE — 90471 IMMUNIZATION ADMIN: CPT | Performed by: FAMILY MEDICINE

## 2021-11-08 PROCEDURE — 90686 IIV4 VACC NO PRSV 0.5 ML IM: CPT | Performed by: FAMILY MEDICINE

## 2021-11-08 RX ORDER — METFORMIN HYDROCHLORIDE 500 MG/1
1000 TABLET, EXTENDED RELEASE ORAL
Qty: 180 TABLET | Refills: 1 | Status: SHIPPED | OUTPATIENT
Start: 2021-11-08

## 2021-11-08 NOTE — PROGRESS NOTES
"Chief Complaint  Hypertension    Subjective          Ming Swenson presents to [facility]  Hypertension         History of Present Illness:  Ming Swenson is a pleasant male who presents today for follow-up.    Hypertension  The patient has a blood pressure of 115/74, today. Mr. Smith's previous blood pressure reading was higher than today's reading. He admits to taking his medication as prescribed and making healthier food choices. The patient indicates that he weighs himself every Friday and has lost 1 to 2 pounds per week. He may be interested in weight loss surgery.    Deconditioning  Mr. Swenson describes a time when he laid in bed for a long time and awaked, walked around and felt deconditioning of the lungs. However, the patient has demonstrated improvement in his condition. He walked into his appointment today instead of using a wheelchair.     Foot pain  The patient admits to taking regular walks and progressively improving his foot health, \"the foot feels good.\"      Work-related, Insomnia  Over the weekend, Mr. Swenson admits to taking trazodone with no improvement.  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 to 7-8:00 a.m., 5 days of 12 hour shifts) may be contributing to his irregular periods of sleep.     Current medications  The patient's prescriptions should be sent to [facility]. He admits to taking metformin and trazodone within the last few days without results.     Health maintenance  Mr. Swenson denies having the flu vaccine and has accepted to receive the vaccine.    Objective   Vital Signs:   /74   Pulse 87   Temp 98.6 °F (37 °C)   Resp 16   Ht 185.4 cm (73\")   Wt (!) 196 kg (432 lb)   SpO2 98%   BMI 57.00 kg/m²     Physical Exam  Constitutional:       Appearance: He is obese.   HENT:      Head: Normocephalic and atraumatic.   Eyes:      Conjunctiva/sclera: Conjunctivae normal.      Pupils: " Pupils are equal, round, and reactive to light.   Cardiovascular:      Rate and Rhythm: Normal rate and regular rhythm.      Heart sounds: Normal heart sounds.   Pulmonary:      Effort: Pulmonary effort is normal.      Breath sounds: Normal breath sounds.      Comments: The patient admits to having deconditioning  used a wheelchair previously.  Gets soa easily with exertion.  Musculoskeletal:      Cervical back: Neck supple.      Right lower leg: Edema present.      Left lower leg: Edema present.      Comments: Can bear weight on the right foot now   Skin:     General: Skin is warm and dry.   Neurological:      General: No focal deficit present.   Psychiatric:         Mood and Affect: Mood normal.        Result Review :     Most Recent A1C    HGBA1C Most Recent 11/8/21   Hemoglobin A1C 9.5                     Assessment and Plan    Diagnoses and all orders for this visit:    1. Type 2 diabetes mellitus without complication, without long-term current use of insulin (HCC) (Primary)  -     POC Glycosylated Hemoglobin (Hb A1C)  -     metFORMIN ER (GLUCOPHAGE-XR) 500 MG 24 hr tablet; Take 2 tablets by mouth Daily With Breakfast.  Dispense: 180 tablet; Refill: 1    2. Primary hypertension    Other orders  -     FluLaval/Fluarix/Fluzone >6 Months    agreed to continue weight loss.    Follow Up   Return in about 4 months (around 3/8/2022) for Diabetes with A1C.  Patient was given instructions and counseling regarding his condition or for health maintenance advice. Please see specific information pulled into the AVS if appropriate.     Transcribed from ambient dictation for Patrick Chan MD by Melissa FALLON.  11/08/21   18:40 EST    Patient verbalized consent to the visit recording.  I have personally performed the services described in this document as transcribed by the above individual, and it is both accurate and complete.  Patrick Chan MD  11/8/2021  22:31 EST

## 2022-01-22 DIAGNOSIS — I10 ESSENTIAL HYPERTENSION: ICD-10-CM

## 2022-01-22 RX ORDER — BENAZEPRIL HYDROCHLORIDE 10 MG/1
TABLET ORAL
Qty: 90 TABLET | Refills: 1 | Status: SHIPPED | OUTPATIENT
Start: 2022-01-22 | End: 2022-08-23

## 2022-03-08 ENCOUNTER — OFFICE VISIT (OUTPATIENT)
Dept: FAMILY MEDICINE CLINIC | Facility: CLINIC | Age: 52
End: 2022-03-08

## 2022-03-08 VITALS
BODY MASS INDEX: 41.75 KG/M2 | DIASTOLIC BLOOD PRESSURE: 84 MMHG | HEART RATE: 88 BPM | RESPIRATION RATE: 13 BRPM | TEMPERATURE: 98 F | OXYGEN SATURATION: 97 % | WEIGHT: 315 LBS | SYSTOLIC BLOOD PRESSURE: 122 MMHG | HEIGHT: 73 IN

## 2022-03-08 DIAGNOSIS — E11.9 CONTROLLED TYPE 2 DIABETES MELLITUS WITHOUT COMPLICATION, WITHOUT LONG-TERM CURRENT USE OF INSULIN: Primary | ICD-10-CM

## 2022-03-08 DIAGNOSIS — I10 ESSENTIAL HYPERTENSION: ICD-10-CM

## 2022-03-08 LAB
EXPIRATION DATE: NORMAL
HBA1C MFR BLD: 11.3 %
Lab: NORMAL

## 2022-03-08 PROCEDURE — 99214 OFFICE O/P EST MOD 30 MIN: CPT | Performed by: FAMILY MEDICINE

## 2022-03-08 PROCEDURE — 83036 HEMOGLOBIN GLYCOSYLATED A1C: CPT | Performed by: FAMILY MEDICINE

## 2022-03-08 RX ORDER — TRIAMTERENE AND HYDROCHLOROTHIAZIDE 37.5; 25 MG/1; MG/1
1 TABLET ORAL DAILY
Qty: 90 TABLET | Refills: 1 | Status: SHIPPED | OUTPATIENT
Start: 2022-03-08

## 2022-03-09 NOTE — PROGRESS NOTES
"Chief Complaint  Diabetes    Subjective          Ming Swenson presents to [facility]  Diabetes  He presents for his follow-up diabetic visit. He has type 2 diabetes mellitus. His disease course has been worsening. Associated symptoms include fatigue. Symptoms are stable. Risk factors for coronary artery disease include male sex, obesity and diabetes mellitus. Current diabetic treatment includes oral agent (monotherapy). He is compliant with treatment some of the time. An ACE inhibitor/angiotensin II receptor blocker is being taken.   Hypertension  This is a chronic problem. The current episode started more than 1 year ago. The problem is controlled. Current antihypertension treatment includes ACE inhibitors, diuretics and lifestyle changes. The current treatment provides significant improvement. There is no history of angina, kidney disease or CAD/MI.       Objective   Vital Signs:   /84 (BP Location: Left arm, Patient Position: Sitting, Cuff Size: Large Adult)   Pulse 88   Temp 98 °F (36.7 °C)   Resp 13   Ht 185.4 cm (73\")   Wt (!) 188 kg (415 lb)   SpO2 97%   BMI 54.75 kg/m²     Physical Exam  Constitutional:       Appearance: He is obese.   HENT:      Head: Normocephalic and atraumatic.   Eyes:      Conjunctiva/sclera: Conjunctivae normal.      Pupils: Pupils are equal, round, and reactive to light.   Cardiovascular:      Rate and Rhythm: Normal rate and regular rhythm.      Heart sounds: Normal heart sounds.   Pulmonary:      Effort: Pulmonary effort is normal.      Breath sounds: Normal breath sounds.   Musculoskeletal:      Cervical back: Neck supple.      Right lower leg: No edema.      Left lower leg: No edema.   Skin:     General: Skin is warm and dry.   Neurological:      Mental Status: He is alert.      Comments: Gait much improved        Result Review :                 Assessment and Plan    Diagnoses and all orders for this visit:    1. Controlled type 2 " diabetes mellitus without complication, without long-term current use of insulin (HCC) (Primary)  -     POC Glycosylated Hemoglobin (Hb A1C)  -     Dulaglutide 0.75 MG/0.5ML solution pen-injector; Inject 0.75 mg under the skin into the appropriate area as directed Every 7 (Seven) Days.  Dispense: 4 pen; Refill: 2    2. Essential hypertension  -     triamterene-hydrochlorothiazide (MAXZIDE-25) 37.5-25 MG per tablet; Take 1 tablet by mouth Daily.  Dispense: 90 tablet; Refill: 1    discussed poor diabetic control and agreed to start trulicity and do better on diet knows what to do just need more motivation    Follow Up   Return in about 3 months (around 6/8/2022) for Diabetes with A1C.  Patient was given instructions and counseling regarding his condition or for health maintenance advice. Please see specific information pulled into the AVS if appropriate.

## 2022-03-22 ENCOUNTER — TELEPHONE (OUTPATIENT)
Dept: FAMILY MEDICINE CLINIC | Facility: CLINIC | Age: 52
End: 2022-03-22

## 2022-03-22 NOTE — TELEPHONE ENCOUNTER
"  Caller: Ming Swenson    Relationship: Self    Best call back number: [phone]    What orders are you requesting (i.e. lab or imaging): TESTOSTERONE    In what timeframe would the patient need to come in: NA    Where will you receive your lab/imaging services: NA    Additional notes: PATIENT STATES HIS LIBIDO HAS NOT \"WORKING\" FOR THE LAST 4 YEARS AND IS GOING THROUGH A DIVORCE      "

## 2022-04-01 ENCOUNTER — TELEPHONE (OUTPATIENT)
Dept: FAMILY MEDICINE CLINIC | Facility: CLINIC | Age: 52
End: 2022-04-01

## 2022-04-01 NOTE — TELEPHONE ENCOUNTER
Caller: Ming Swenson    Relationship: Self    Best call back number: [phone]    What medication are you requesting: SILDENAFIL    What are your current symptoms: TROUBLE GETTING AN ERECTION    How long have you been experiencing symptoms: 4 YEARS    Have you had these symptoms before:    [x] Yes  [] No    Have you been treated for these symptoms before:   [x] Yes  [] No    If a prescription is needed, what is your preferred pharmacy and phone number: [id number] - [place], [place] - [address] [phone] [facility] [phone]

## 2022-04-05 ENCOUNTER — OFFICE VISIT (OUTPATIENT)
Dept: FAMILY MEDICINE CLINIC | Facility: CLINIC | Age: 52
End: 2022-04-05

## 2022-04-05 VITALS
OXYGEN SATURATION: 98 % | BODY MASS INDEX: 61.84 KG/M2 | HEIGHT: 60 IN | WEIGHT: 315 LBS | TEMPERATURE: 98 F | HEART RATE: 83 BPM | DIASTOLIC BLOOD PRESSURE: 80 MMHG | SYSTOLIC BLOOD PRESSURE: 132 MMHG

## 2022-04-05 DIAGNOSIS — I10 PRIMARY HYPERTENSION: Primary | ICD-10-CM

## 2022-04-05 DIAGNOSIS — E11.9 CONTROLLED TYPE 2 DIABETES MELLITUS WITHOUT COMPLICATION, WITHOUT LONG-TERM CURRENT USE OF INSULIN: ICD-10-CM

## 2022-04-05 PROCEDURE — 99214 OFFICE O/P EST MOD 30 MIN: CPT | Performed by: FAMILY MEDICINE

## 2022-04-05 RX ORDER — FLUTICASONE PROPIONATE 50 MCG
SPRAY, SUSPENSION (ML) NASAL
COMMUNITY
Start: 2022-03-25 | End: 2022-04-05

## 2022-04-05 RX ORDER — SILDENAFIL 100 MG/1
100 TABLET, FILM COATED ORAL DAILY PRN
Qty: 30 TABLET | Refills: 0 | Status: SHIPPED | OUTPATIENT
Start: 2022-04-05 | End: 2022-09-06

## 2022-04-05 RX ORDER — FLUTICASONE FUROATE 27.5 UG/1
SPRAY, METERED NASAL EVERY 12 HOURS SCHEDULED
COMMUNITY
End: 2022-04-05

## 2022-04-06 NOTE — PROGRESS NOTES
"Chief Complaint  review medication  (Ed meds)    Subjective          Ming Swenson presents to [facility]  Diabetes  He presents for his follow-up (home glucose levels improving) diabetic visit. He has type 2 diabetes mellitus. His disease course has been worsening. There are no hypoglycemic associated symptoms. Associated symptoms include fatigue. There are no hypoglycemic complications. Symptoms are stable. Risk factors for coronary artery disease include male sex, obesity and diabetes mellitus. Current diabetic treatment includes oral agent (monotherapy). He is compliant with treatment some of the time. An ACE inhibitor/angiotensin II receptor blocker is being taken.   Hypertension  This is a chronic problem. The current episode started more than 1 year ago. The problem is controlled. Current antihypertension treatment includes ACE inhibitors, diuretics and lifestyle changes. The current treatment provides significant improvement. There is no history of angina, kidney disease or CAD/MI.       Objective   Vital Signs:   /80   Pulse 83   Temp 98 °F (36.7 °C)   Ht 54.7 cm (21.54\")   Wt (!) 181 kg (399 lb)   SpO2 98%   .45 kg/m²            Physical Exam  Vitals and nursing note reviewed.   Constitutional:       Appearance: Normal appearance. He is well-developed. He is obese.   HENT:      Head: Normocephalic and atraumatic.      Right Ear: External ear normal.      Left Ear: External ear normal.      Nose: Nose normal.   Eyes:      General: No scleral icterus.     Conjunctiva/sclera: Conjunctivae normal.      Pupils: Pupils are equal, round, and reactive to light.   Neck:      Thyroid: No thyromegaly.   Cardiovascular:      Rate and Rhythm: Normal rate and regular rhythm.      Heart sounds: Normal heart sounds.   Pulmonary:      Effort: Pulmonary effort is normal.      Breath sounds: Normal breath sounds.   Musculoskeletal:         General: Normal range of motion. "      Cervical back: Neck supple.      Right lower leg: No edema.      Left lower leg: No edema.   Skin:     General: Skin is warm and dry.      Findings: No rash.   Neurological:      Mental Status: He is alert and oriented to person, place, and time.      Comments: No focal deficits no lateralizing signs   Psychiatric:         Behavior: Behavior is cooperative.        Result Review :                 Assessment and Plan    Diagnoses and all orders for this visit:    1. Primary hypertension (Primary)    2. Controlled type 2 diabetes mellitus without complication, without long-term current use of insulin (HCC)    Other orders  -     sildenafil (Viagra) 100 MG tablet; Take 1 tablet by mouth Daily As Needed for Erectile Dysfunction.  Dispense: 30 tablet; Refill: 0        Follow Up   Return for Keep Appt..  Patient was given instructions and counseling regarding his condition or for health maintenance advice. Please see specific information pulled into the AVS if appropriate.

## 2022-08-17 ENCOUNTER — OFFICE VISIT (OUTPATIENT)
Dept: FAMILY MEDICINE CLINIC | Facility: CLINIC | Age: 52
End: 2022-08-17

## 2022-08-17 VITALS
OXYGEN SATURATION: 98 % | SYSTOLIC BLOOD PRESSURE: 122 MMHG | HEIGHT: 72 IN | DIASTOLIC BLOOD PRESSURE: 84 MMHG | WEIGHT: 315 LBS | TEMPERATURE: 97.8 F | BODY MASS INDEX: 42.66 KG/M2 | HEART RATE: 88 BPM

## 2022-08-17 DIAGNOSIS — G47.00 INSOMNIA, UNSPECIFIED TYPE: ICD-10-CM

## 2022-08-17 DIAGNOSIS — N41.9 PROSTATITIS, UNSPECIFIED PROSTATITIS TYPE: Primary | ICD-10-CM

## 2022-08-17 DIAGNOSIS — E11.9 CONTROLLED TYPE 2 DIABETES MELLITUS WITHOUT COMPLICATION, WITHOUT LONG-TERM CURRENT USE OF INSULIN: ICD-10-CM

## 2022-08-17 LAB
EXPIRATION DATE: NORMAL
HBA1C MFR BLD: 5.3 %
Lab: NORMAL

## 2022-08-17 PROCEDURE — 83036 HEMOGLOBIN GLYCOSYLATED A1C: CPT | Performed by: PHYSICIAN ASSISTANT

## 2022-08-17 PROCEDURE — 99214 OFFICE O/P EST MOD 30 MIN: CPT | Performed by: PHYSICIAN ASSISTANT

## 2022-08-17 RX ORDER — DOXYCYCLINE HYCLATE 100 MG/1
100 CAPSULE ORAL 2 TIMES DAILY
Qty: 30 CAPSULE | Refills: 0 | Status: SHIPPED | OUTPATIENT
Start: 2022-08-17 | End: 2022-10-03

## 2022-08-17 RX ORDER — TRAZODONE HYDROCHLORIDE 50 MG/1
50 TABLET ORAL NIGHTLY
Qty: 90 TABLET | Refills: 1 | Status: SHIPPED | OUTPATIENT
Start: 2022-08-17

## 2022-08-17 NOTE — PROGRESS NOTES
Follow Up Office Visit      Date: 2022   Patient Name: Ming Swenson  : 1970   MRN: [id number]     Chief Complaint:    Chief Complaint   Patient presents with   • prostate inflammation       History of Present Illness: Ming Swenson is a 51 y.o. male who is here today to follow up with symptoms of prostatitis.  Unfortunately this is a grace he has fought several times before and does have a urology appointment soon.  He has had pressure and urinary frequency and urgency.  He denies any problems urinating, stream is not weak.    In addition he is a diabetic, his last A1c was quite elevated but he has lost 40 pounds since April.  We will check his A1c again today.    He would also like a refill on trazodone as well.      Subjective      Review of systems:  Review of Systems   Constitutional: Negative for fatigue and fever.   HENT: Negative for trouble swallowing.    Eyes: Negative for visual disturbance.   Respiratory: Negative for shortness of breath.    Cardiovascular: Negative for chest pain and leg swelling.   Gastrointestinal: Negative for abdominal pain.   Genitourinary: Positive for frequency and urgency. Negative for flank pain and hematuria.        I have reviewed and the following portions of the patient's history were updated as appropriate: past family history, past medical history, past social history, past surgical history and problem list.    Medications:     Current Outpatient Medications:   •  aspirin (aspirin) 81 MG EC tablet, Take 1 tablet by mouth Daily., Disp: 100 tablet, Rfl: 0  •  Dulaglutide 0.75 MG/0.5ML solution pen-injector, Inject 0.75 mg under the skin into the appropriate area as directed Every 7 (Seven) Days., Disp: 4 pen, Rfl: 2  •  metFORMIN ER (GLUCOPHAGE-XR) 500 MG 24 hr tablet, Take 2 tablets by mouth Daily With Breakfast., Disp: 180 tablet, Rfl: 1  •  rivaroxaban (XARELTO) 10 MG tablet, Xarelto 10 mg tablet  TAKE 1 TABLET BY MOUTH EVERY DAY, Disp: , Rfl:   •   "sildenafil (Viagra) 100 MG tablet, Take 1 tablet by mouth Daily As Needed for Erectile Dysfunction., Disp: 30 tablet, Rfl: 0  •  triamterene-hydrochlorothiazide (MAXZIDE-25) 37.5-25 MG per tablet, Take 1 tablet by mouth Daily., Disp: 90 tablet, Rfl: 1  •  benazepril (LOTENSIN) 10 MG tablet, TAKE 1 TABLET BY MOUTH EVERY DAY, Disp: 90 tablet, Rfl: 0  •  doxycycline (VIBRAMYCIN) 100 MG capsule, Take 1 capsule by mouth 2 (Two) Times a Day for 15 days., Disp: 30 capsule, Rfl: 0  •  traZODone (DESYREL) 50 MG tablet, Take 1 tablet by mouth Every Night., Disp: 90 tablet, Rfl: 1    Allergies:   Allergies   Allergen Reactions   • Rofecoxib Rash     (Vioxx)       Objective     Vital Signs:   Vitals:    08/17/22 1705   BP: 122/84   Pulse: 88   Temp: 97.8 °F (36.6 °C)   SpO2: 98%   Weight: (!) 163 kg (359 lb 11.2 oz)   Height: 182.9 cm (72.01\")   PainSc: 0-No pain     Body mass index is 48.77 kg/m².   Class 3 Severe Obesity (BMI >=40). Obesity-related health conditions include the following: hypertension and diabetes mellitus. Obesity is improving with treatment. BMI is is above average; BMI management plan is completed. We discussed low calorie, low carb based diet program, portion control and increasing exercise.      Physical Exam:   Physical Exam  Vitals and nursing note reviewed.   Constitutional:       Appearance: Normal appearance.   HENT:      Head: Normocephalic and atraumatic.   Cardiovascular:      Rate and Rhythm: Regular rhythm.   Pulmonary:      Effort: Pulmonary effort is normal.      Breath sounds: Normal breath sounds.   Abdominal:      Palpations: Abdomen is soft.      Tenderness: There is no right CVA tenderness or left CVA tenderness.   Musculoskeletal:      Cervical back: Neck supple.   Neurological:      Mental Status: He is alert.          Assessment / Plan      Assessment/Plan:   Diagnoses and all orders for this visit:    1. Prostatitis, unspecified prostatitis type (Primary)  -     doxycycline " (VIBRAMYCIN) 100 MG capsule; Take 1 capsule by mouth 2 (Two) Times a Day for 15 days.  Dispense: 30 capsule; Refill: 0    2. Insomnia, unspecified type  -     traZODone (DESYREL) 50 MG tablet; Take 1 tablet by mouth Every Night.  Dispense: 90 tablet; Refill: 1    3. Controlled type 2 diabetes mellitus without complication, without long-term current use of insulin ([facility])  -     POC Glycosylated Hemoglobin (Hb A1C)    Will start doxycycline twice a day for 2 weeks.  We may need to continue this until he sees urology in September.  He will let me know how he is doing.    Hemoglobin A1c was 5.3% today.  5 months ago this was at 11.3%.  He is doing very very well with his weight loss and I congratulated him.    Follow Up:   No follow-ups on file.    Bernadine Escobar PA-C   [facility] [place]

## 2022-08-23 DIAGNOSIS — I10 ESSENTIAL HYPERTENSION: ICD-10-CM

## 2022-08-23 RX ORDER — BENAZEPRIL HYDROCHLORIDE 10 MG/1
TABLET ORAL
Qty: 90 TABLET | Refills: 0 | Status: SHIPPED | OUTPATIENT
Start: 2022-08-23 | End: 2022-11-15 | Stop reason: SDUPTHER

## 2022-08-23 NOTE — TELEPHONE ENCOUNTER
Rx Refill Note  Requested Prescriptions     Pending Prescriptions Disp Refills   • benazepril (LOTENSIN) 10 MG tablet [Pharmacy Med Name: BENAZEPRIL HCL 10 MG TABLET] 90 tablet 1     Sig: TAKE 1 TABLET BY MOUTH EVERY DAY      Last office visit with prescribing clinician: 4/5/2022      Next office visit with prescribing clinician: Visit date not found            Susy Rosales LPN  08/23/22, 08:03 EDT

## 2022-09-06 RX ORDER — SILDENAFIL 100 MG/1
TABLET, FILM COATED ORAL
Qty: 30 TABLET | Refills: 0 | Status: SHIPPED | OUTPATIENT
Start: 2022-09-06 | End: 2023-04-01 | Stop reason: SDUPTHER

## 2022-09-30 ENCOUNTER — OFFICE VISIT (OUTPATIENT)
Dept: FAMILY MEDICINE CLINIC | Facility: CLINIC | Age: 52
End: 2022-09-30

## 2022-09-30 ENCOUNTER — APPOINTMENT (OUTPATIENT)
Dept: CT IMAGING | Facility: HOSPITAL | Age: 52
End: 2022-09-30

## 2022-09-30 ENCOUNTER — HOSPITAL ENCOUNTER (EMERGENCY)
Facility: HOSPITAL | Age: 52
Discharge: HOME | End: 2022-09-30
Attending: EMERGENCY MEDICINE | Admitting: EMERGENCY MEDICINE

## 2022-09-30 VITALS
HEIGHT: 72 IN | TEMPERATURE: 97.5 F | WEIGHT: 315 LBS | SYSTOLIC BLOOD PRESSURE: 126 MMHG | OXYGEN SATURATION: 100 % | BODY MASS INDEX: 42.66 KG/M2 | DIASTOLIC BLOOD PRESSURE: 82 MMHG | RESPIRATION RATE: 21 BRPM | HEART RATE: 74 BPM

## 2022-09-30 VITALS
TEMPERATURE: 97.5 F | BODY MASS INDEX: 41.75 KG/M2 | RESPIRATION RATE: 18 BRPM | SYSTOLIC BLOOD PRESSURE: 133 MMHG | HEART RATE: 64 BPM | DIASTOLIC BLOOD PRESSURE: 91 MMHG | OXYGEN SATURATION: 97 % | HEIGHT: 73 IN | WEIGHT: 315 LBS

## 2022-09-30 DIAGNOSIS — K44.9 HIATAL HERNIA: ICD-10-CM

## 2022-09-30 DIAGNOSIS — K42.9 UMBILICAL HERNIA WITHOUT OBSTRUCTION AND WITHOUT GANGRENE: Primary | ICD-10-CM

## 2022-09-30 DIAGNOSIS — R10.9 ACUTE ABDOMINAL PAIN: ICD-10-CM

## 2022-09-30 DIAGNOSIS — K40.90 UNILATERAL INGUINAL HERNIA WITHOUT OBSTRUCTION OR GANGRENE, RECURRENCE NOT SPECIFIED: ICD-10-CM

## 2022-09-30 DIAGNOSIS — N40.0 ENLARGED PROSTATE: ICD-10-CM

## 2022-09-30 DIAGNOSIS — K42.9 REDUCIBLE UMBILICAL HERNIA: ICD-10-CM

## 2022-09-30 DIAGNOSIS — K57.90 DIVERTICULOSIS: ICD-10-CM

## 2022-09-30 DIAGNOSIS — R10.33 PERIUMBILICAL ABDOMINAL PAIN: Primary | ICD-10-CM

## 2022-09-30 LAB
ALBUMIN SERPL-MCNC: 4.5 G/DL (ref 3.5–5.2)
ALBUMIN/GLOB SERPL: 1.4 G/DL
ALP SERPL-CCNC: 60 U/L (ref 39–117)
ALT SERPL W P-5'-P-CCNC: 20 U/L (ref 1–41)
ANION GAP SERPL CALCULATED.3IONS-SCNC: 11 MMOL/L (ref 5–15)
AST SERPL-CCNC: 18 U/L (ref 1–40)
BASOPHILS # BLD AUTO: 0.06 10*3/MM3 (ref 0–0.2)
BASOPHILS NFR BLD AUTO: 0.8 % (ref 0–1.5)
BILIRUB SERPL-MCNC: 0.5 MG/DL (ref 0–1.2)
BILIRUB UR QL STRIP: NEGATIVE
BUN SERPL-MCNC: 13 MG/DL (ref 6–20)
BUN/CREAT SERPL: 21.7 (ref 7–25)
CALCIUM SPEC-SCNC: 9.6 MG/DL (ref 8.6–10.5)
CHLORIDE SERPL-SCNC: 104 MMOL/L (ref 98–107)
CLARITY UR: CLEAR
CO2 SERPL-SCNC: 26 MMOL/L (ref 22–29)
COLOR UR: YELLOW
CREAT SERPL-MCNC: 0.6 MG/DL (ref 0.76–1.27)
D-LACTATE SERPL-SCNC: 1.1 MMOL/L (ref 0.5–2)
DEPRECATED RDW RBC AUTO: 44.4 FL (ref 37–54)
EGFRCR SERPLBLD CKD-EPI 2021: 118.3 ML/MIN/1.73
EOSINOPHIL # BLD AUTO: 0.19 10*3/MM3 (ref 0–0.4)
EOSINOPHIL NFR BLD AUTO: 2.6 % (ref 0.3–6.2)
ERYTHROCYTE [DISTWIDTH] IN BLOOD BY AUTOMATED COUNT: 13.8 % (ref 12.3–15.4)
GLOBULIN UR ELPH-MCNC: 3.2 GM/DL
GLUCOSE SERPL-MCNC: 93 MG/DL (ref 65–99)
GLUCOSE UR STRIP-MCNC: NEGATIVE MG/DL
HCT VFR BLD AUTO: 46.9 % (ref 37.5–51)
HGB BLD-MCNC: 15.8 G/DL (ref 13–17.7)
HGB UR QL STRIP.AUTO: NEGATIVE
HOLD SPECIMEN: NORMAL
IMM GRANULOCYTES # BLD AUTO: 0.02 10*3/MM3 (ref 0–0.05)
IMM GRANULOCYTES NFR BLD AUTO: 0.3 % (ref 0–0.5)
KETONES UR QL STRIP: ABNORMAL
LEUKOCYTE ESTERASE UR QL STRIP.AUTO: NEGATIVE
LIPASE SERPL-CCNC: 31 U/L (ref 13–60)
LYMPHOCYTES # BLD AUTO: 1.75 10*3/MM3 (ref 0.7–3.1)
LYMPHOCYTES NFR BLD AUTO: 23.7 % (ref 19.6–45.3)
MCH RBC QN AUTO: 29.5 PG (ref 26.6–33)
MCHC RBC AUTO-ENTMCNC: 33.7 G/DL (ref 31.5–35.7)
MCV RBC AUTO: 87.7 FL (ref 79–97)
MONOCYTES # BLD AUTO: 0.71 10*3/MM3 (ref 0.1–0.9)
MONOCYTES NFR BLD AUTO: 9.6 % (ref 5–12)
NEUTROPHILS NFR BLD AUTO: 4.65 10*3/MM3 (ref 1.7–7)
NEUTROPHILS NFR BLD AUTO: 63 % (ref 42.7–76)
NITRITE UR QL STRIP: NEGATIVE
NRBC BLD AUTO-RTO: 0 /100 WBC (ref 0–0.2)
PH UR STRIP.AUTO: 6.5 [PH] (ref 5–8)
PLATELET # BLD AUTO: 239 10*3/MM3 (ref 140–450)
PMV BLD AUTO: 12.1 FL (ref 6–12)
POTASSIUM SERPL-SCNC: 4.4 MMOL/L (ref 3.5–5.2)
PROT SERPL-MCNC: 7.7 G/DL (ref 6–8.5)
PROT UR QL STRIP: ABNORMAL
RBC # BLD AUTO: 5.35 10*6/MM3 (ref 4.14–5.8)
SODIUM SERPL-SCNC: 141 MMOL/L (ref 136–145)
SP GR UR STRIP: 1.03 (ref 1–1.03)
TROPONIN T SERPL-MCNC: <0.01 NG/ML (ref 0–0.03)
UROBILINOGEN UR QL STRIP: ABNORMAL
WBC NRBC COR # BLD: 7.38 10*3/MM3 (ref 3.4–10.8)
WHOLE BLOOD HOLD COAG: NORMAL
WHOLE BLOOD HOLD SPECIMEN: NORMAL

## 2022-09-30 PROCEDURE — 99283 EMERGENCY DEPT VISIT LOW MDM: CPT

## 2022-09-30 PROCEDURE — 80053 COMPREHEN METABOLIC PANEL: CPT

## 2022-09-30 PROCEDURE — 85025 COMPLETE CBC W/AUTO DIFF WBC: CPT

## 2022-09-30 PROCEDURE — 0 DIATRIZOATE MEGLUMINE & SODIUM PER 1 ML: Performed by: EMERGENCY MEDICINE

## 2022-09-30 PROCEDURE — 25010000002 IOPAMIDOL 61 % SOLUTION: Performed by: EMERGENCY MEDICINE

## 2022-09-30 PROCEDURE — 81003 URINALYSIS AUTO W/O SCOPE: CPT

## 2022-09-30 PROCEDURE — 25010000002 ONDANSETRON PER 1 MG: Performed by: EMERGENCY MEDICINE

## 2022-09-30 PROCEDURE — 74177 CT ABD & PELVIS W/CONTRAST: CPT

## 2022-09-30 PROCEDURE — 84484 ASSAY OF TROPONIN QUANT: CPT | Performed by: NURSE PRACTITIONER

## 2022-09-30 PROCEDURE — 96375 TX/PRO/DX INJ NEW DRUG ADDON: CPT

## 2022-09-30 PROCEDURE — 25010000002 HYDROMORPHONE PER 4 MG: Performed by: EMERGENCY MEDICINE

## 2022-09-30 PROCEDURE — 83605 ASSAY OF LACTIC ACID: CPT | Performed by: EMERGENCY MEDICINE

## 2022-09-30 PROCEDURE — 93005 ELECTROCARDIOGRAM TRACING: CPT | Performed by: NURSE PRACTITIONER

## 2022-09-30 PROCEDURE — 83690 ASSAY OF LIPASE: CPT

## 2022-09-30 PROCEDURE — 96374 THER/PROPH/DIAG INJ IV PUSH: CPT

## 2022-09-30 PROCEDURE — 99214 OFFICE O/P EST MOD 30 MIN: CPT | Performed by: STUDENT IN AN ORGANIZED HEALTH CARE EDUCATION/TRAINING PROGRAM

## 2022-09-30 RX ORDER — HYDROMORPHONE HYDROCHLORIDE 1 MG/ML
0.5 INJECTION, SOLUTION INTRAMUSCULAR; INTRAVENOUS; SUBCUTANEOUS ONCE
Status: COMPLETED | OUTPATIENT
Start: 2022-09-30 | End: 2022-09-30

## 2022-09-30 RX ORDER — SODIUM CHLORIDE 0.9 % (FLUSH) 0.9 %
10 SYRINGE (ML) INJECTION AS NEEDED
Status: DISCONTINUED | OUTPATIENT
Start: 2022-09-30 | End: 2022-09-30 | Stop reason: HOSPADM

## 2022-09-30 RX ORDER — ONDANSETRON 2 MG/ML
4 INJECTION INTRAMUSCULAR; INTRAVENOUS ONCE
Status: COMPLETED | OUTPATIENT
Start: 2022-09-30 | End: 2022-09-30

## 2022-09-30 RX ORDER — SODIUM CHLORIDE 9 MG/ML
10 INJECTION INTRAVENOUS AS NEEDED
Status: DISCONTINUED | OUTPATIENT
Start: 2022-09-30 | End: 2022-09-30 | Stop reason: HOSPADM

## 2022-09-30 RX ADMIN — SODIUM CHLORIDE 1000 ML: 9 INJECTION, SOLUTION INTRAVENOUS at 17:01

## 2022-09-30 RX ADMIN — HYDROMORPHONE HYDROCHLORIDE 0.5 MG: 1 INJECTION, SOLUTION INTRAMUSCULAR; INTRAVENOUS; SUBCUTANEOUS at 17:01

## 2022-09-30 RX ADMIN — IOPAMIDOL 94 ML: 612 INJECTION, SOLUTION INTRAVENOUS at 18:16

## 2022-09-30 RX ADMIN — ONDANSETRON 4 MG: 2 INJECTION INTRAMUSCULAR; INTRAVENOUS at 17:00

## 2022-09-30 RX ADMIN — DIATRIZOATE MEGLUMINE AND DIATRIZOATE SODIUM 15 ML: 660; 100 LIQUID ORAL; RECTAL at 17:01

## 2022-09-30 NOTE — PROGRESS NOTES
"Chief Complaint  Abdominal Pain (Pt states he had a bowel movement at 10 and shortly after he started having severe abdominal pain. )    History of Present Illness     He has never had this pain before.   He says he had a bm at 10 am and after he started having severe pain over the course of the next hour. The pain is 10/10 in severity and takes his breath. He feels bloating. He has not eaten since yesterday as he fasts. No fever. He is nauseous but no vomiting. He thinks his bm was normal. No blood or black stool.     The following portions of the patient's history were reviewed and updated as appropriate: allergies, current medications, past family history, past medical history, past social history, past surgical history, and problem list.    OBJECTIVE:  /82   Pulse 74   Temp 97.5 °F (36.4 °C) (Temporal)   Resp 21   Ht 182.9 cm (72.01\")   Wt (!) 158 kg (348 lb)   SpO2 100%   BMI 47.19 kg/m²       Physical Exam  Constitutional:       General: He is not in acute distress.     Appearance: Normal appearance. He is obese.   HENT:      Head: Normocephalic and atraumatic.   Eyes:      Extraocular Movements: Extraocular movements intact.   Cardiovascular:      Rate and Rhythm: Normal rate and regular rhythm.      Heart sounds: No murmur heard.  Pulmonary:      Effort: Pulmonary effort is normal. No respiratory distress.      Breath sounds: Normal breath sounds. No stridor. No wheezing, rhonchi or rales.   Abdominal:      Palpations: Abdomen is soft. There is no mass.      Tenderness: There is abdominal tenderness (very tender to palpation in lower quadrants). There is rebound. There is no guarding.      Comments: Decreased bowel sounds   Skin:     Findings: No rash.   Neurological:      General: No focal deficit present.      Mental Status: He is alert.   Psychiatric:         Mood and Affect: Mood normal.                    Assessment and Plan   Diagnoses and all orders for this visit:    1. Periumbilical " abdominal pain (Primary)  Assessment & Plan:  Given severe pain after bm to 10/10 with sob and decreased bowel sounds I have recommended for him to be evaluated in the er via ambulance at this time. He declines our transport but is agreeable to going to the er now. Wife transporting him.           Return in about 5 days (around 10/5/2022) for with pcp.       Justyna Yeung D.O.  [facility] [place]

## 2022-09-30 NOTE — ASSESSMENT & PLAN NOTE
Given severe pain after bm to 10/10 with sob and decreased bowel sounds I have recommended for him to be evaluated in the er via ambulance at this time. He declines our transport but is agreeable to going to the er now. Wife transporting him.

## 2022-10-03 DIAGNOSIS — N41.9 PROSTATITIS, UNSPECIFIED PROSTATITIS TYPE: ICD-10-CM

## 2022-10-03 LAB
QT INTERVAL: 442 MS
QTC INTERVAL: 459 MS

## 2022-10-03 RX ORDER — DOXYCYCLINE HYCLATE 100 MG/1
100 CAPSULE ORAL 2 TIMES DAILY
Qty: 20 CAPSULE | Refills: 0 | Status: SHIPPED | OUTPATIENT
Start: 2022-10-03

## 2022-10-03 NOTE — TELEPHONE ENCOUNTER
Rx Refill Note  Requested Prescriptions     Pending Prescriptions Disp Refills   • doxycycline (VIBRAMYCIN) 100 MG capsule [Pharmacy Med Name: DOXYCYCLINE HYCLATE 100 MG CAP] 30 capsule 0     Sig: TAKE 1 CAPSULE BY MOUTH 2 (TWO) TIMES A DAY FOR 15 DAYS.      Last office visit with prescribing clinician: 8/17/2022      Next office visit with prescribing clinician: Visit date not found            Darcie Morse  10/03/22, 13:32 EDT

## 2022-10-11 ENCOUNTER — HOSPITAL ENCOUNTER (EMERGENCY)
Facility: HOSPITAL | Age: 52
Discharge: HOME | End: 2022-10-11
Attending: EMERGENCY MEDICINE | Admitting: EMERGENCY MEDICINE

## 2022-10-11 ENCOUNTER — APPOINTMENT (OUTPATIENT)
Dept: GENERAL RADIOLOGY | Facility: HOSPITAL | Age: 52
End: 2022-10-11

## 2022-10-11 VITALS
BODY MASS INDEX: 41.75 KG/M2 | RESPIRATION RATE: 22 BRPM | TEMPERATURE: 103.1 F | DIASTOLIC BLOOD PRESSURE: 74 MMHG | HEART RATE: 100 BPM | WEIGHT: 315 LBS | HEIGHT: 73 IN | OXYGEN SATURATION: 96 % | SYSTOLIC BLOOD PRESSURE: 137 MMHG

## 2022-10-11 DIAGNOSIS — U07.1 COVID-19: Primary | ICD-10-CM

## 2022-10-11 LAB
ALBUMIN SERPL-MCNC: 4 G/DL (ref 3.5–5.2)
ALBUMIN/GLOB SERPL: 1.3 G/DL
ALP SERPL-CCNC: 55 U/L (ref 39–117)
ALT SERPL W P-5'-P-CCNC: 20 U/L (ref 1–41)
ANION GAP SERPL CALCULATED.3IONS-SCNC: 11 MMOL/L (ref 5–15)
AST SERPL-CCNC: 18 U/L (ref 1–40)
B PARAPERT DNA SPEC QL NAA+PROBE: NOT DETECTED
B PERT DNA SPEC QL NAA+PROBE: NOT DETECTED
BASOPHILS # BLD AUTO: 0.03 10*3/MM3 (ref 0–0.2)
BASOPHILS NFR BLD AUTO: 0.6 % (ref 0–1.5)
BILIRUB SERPL-MCNC: 0.4 MG/DL (ref 0–1.2)
BILIRUB UR QL STRIP: NEGATIVE
BUN SERPL-MCNC: 9 MG/DL (ref 6–20)
BUN/CREAT SERPL: 12.7 (ref 7–25)
C PNEUM DNA NPH QL NAA+NON-PROBE: NOT DETECTED
CALCIUM SPEC-SCNC: 8.9 MG/DL (ref 8.6–10.5)
CHLORIDE SERPL-SCNC: 100 MMOL/L (ref 98–107)
CLARITY UR: CLEAR
CO2 SERPL-SCNC: 24 MMOL/L (ref 22–29)
COLOR UR: YELLOW
CREAT SERPL-MCNC: 0.71 MG/DL (ref 0.76–1.27)
D DIMER PPP FEU-MCNC: 0.29 MCGFEU/ML (ref 0.01–0.5)
DEPRECATED RDW RBC AUTO: 44.6 FL (ref 37–54)
EGFRCR SERPLBLD CKD-EPI 2021: 111.8 ML/MIN/1.73
EOSINOPHIL # BLD AUTO: 0.13 10*3/MM3 (ref 0–0.4)
EOSINOPHIL NFR BLD AUTO: 2.5 % (ref 0.3–6.2)
ERYTHROCYTE [DISTWIDTH] IN BLOOD BY AUTOMATED COUNT: 14.1 % (ref 12.3–15.4)
FLUAV SUBTYP SPEC NAA+PROBE: NOT DETECTED
FLUBV RNA ISLT QL NAA+PROBE: NOT DETECTED
GLOBULIN UR ELPH-MCNC: 3.1 GM/DL
GLUCOSE SERPL-MCNC: 114 MG/DL (ref 65–99)
GLUCOSE UR STRIP-MCNC: NEGATIVE MG/DL
HADV DNA SPEC NAA+PROBE: NOT DETECTED
HCOV 229E RNA SPEC QL NAA+PROBE: NOT DETECTED
HCOV HKU1 RNA SPEC QL NAA+PROBE: NOT DETECTED
HCOV NL63 RNA SPEC QL NAA+PROBE: NOT DETECTED
HCOV OC43 RNA SPEC QL NAA+PROBE: NOT DETECTED
HCT VFR BLD AUTO: 43.4 % (ref 37.5–51)
HGB BLD-MCNC: 14.6 G/DL (ref 13–17.7)
HGB UR QL STRIP.AUTO: NEGATIVE
HMPV RNA NPH QL NAA+NON-PROBE: NOT DETECTED
HPIV1 RNA ISLT QL NAA+PROBE: NOT DETECTED
HPIV2 RNA SPEC QL NAA+PROBE: NOT DETECTED
HPIV3 RNA NPH QL NAA+PROBE: NOT DETECTED
HPIV4 P GENE NPH QL NAA+PROBE: NOT DETECTED
IMM GRANULOCYTES # BLD AUTO: 0.02 10*3/MM3 (ref 0–0.05)
IMM GRANULOCYTES NFR BLD AUTO: 0.4 % (ref 0–0.5)
KETONES UR QL STRIP: NEGATIVE
LEUKOCYTE ESTERASE UR QL STRIP.AUTO: NEGATIVE
LIPASE SERPL-CCNC: 29 U/L (ref 13–60)
LYMPHOCYTES # BLD AUTO: 0.28 10*3/MM3 (ref 0.7–3.1)
LYMPHOCYTES NFR BLD AUTO: 5.3 % (ref 19.6–45.3)
M PNEUMO IGG SER IA-ACNC: NOT DETECTED
MCH RBC QN AUTO: 29 PG (ref 26.6–33)
MCHC RBC AUTO-ENTMCNC: 33.6 G/DL (ref 31.5–35.7)
MCV RBC AUTO: 86.1 FL (ref 79–97)
MONOCYTES # BLD AUTO: 0.4 10*3/MM3 (ref 0.1–0.9)
MONOCYTES NFR BLD AUTO: 7.6 % (ref 5–12)
NEUTROPHILS NFR BLD AUTO: 4.42 10*3/MM3 (ref 1.7–7)
NEUTROPHILS NFR BLD AUTO: 83.6 % (ref 42.7–76)
NITRITE UR QL STRIP: NEGATIVE
NRBC BLD AUTO-RTO: 0 /100 WBC (ref 0–0.2)
PH UR STRIP.AUTO: 6 [PH] (ref 5–8)
PLATELET # BLD AUTO: 174 10*3/MM3 (ref 140–450)
PMV BLD AUTO: 12.4 FL (ref 6–12)
POTASSIUM SERPL-SCNC: 3.9 MMOL/L (ref 3.5–5.2)
PROT SERPL-MCNC: 7.1 G/DL (ref 6–8.5)
PROT UR QL STRIP: NEGATIVE
RBC # BLD AUTO: 5.04 10*6/MM3 (ref 4.14–5.8)
RHINOVIRUS RNA SPEC NAA+PROBE: NOT DETECTED
RSV RNA NPH QL NAA+NON-PROBE: NOT DETECTED
SARS-COV-2 RNA NPH QL NAA+NON-PROBE: DETECTED
SODIUM SERPL-SCNC: 135 MMOL/L (ref 136–145)
SP GR UR STRIP: 1.02 (ref 1–1.03)
UROBILINOGEN UR QL STRIP: NORMAL
WBC NRBC COR # BLD: 5.28 10*3/MM3 (ref 3.4–10.8)

## 2022-10-11 PROCEDURE — 85025 COMPLETE CBC W/AUTO DIFF WBC: CPT | Performed by: EMERGENCY MEDICINE

## 2022-10-11 PROCEDURE — 25010000002 ONDANSETRON PER 1 MG: Performed by: EMERGENCY MEDICINE

## 2022-10-11 PROCEDURE — 85379 FIBRIN DEGRADATION QUANT: CPT | Performed by: EMERGENCY MEDICINE

## 2022-10-11 PROCEDURE — 99284 EMERGENCY DEPT VISIT MOD MDM: CPT

## 2022-10-11 PROCEDURE — 81003 URINALYSIS AUTO W/O SCOPE: CPT | Performed by: EMERGENCY MEDICINE

## 2022-10-11 PROCEDURE — 83690 ASSAY OF LIPASE: CPT | Performed by: EMERGENCY MEDICINE

## 2022-10-11 PROCEDURE — 96374 THER/PROPH/DIAG INJ IV PUSH: CPT

## 2022-10-11 PROCEDURE — 71045 X-RAY EXAM CHEST 1 VIEW: CPT

## 2022-10-11 PROCEDURE — 80053 COMPREHEN METABOLIC PANEL: CPT | Performed by: EMERGENCY MEDICINE

## 2022-10-11 PROCEDURE — 0202U NFCT DS 22 TRGT SARS-COV-2: CPT | Performed by: EMERGENCY MEDICINE

## 2022-10-11 RX ORDER — ONDANSETRON 2 MG/ML
4 INJECTION INTRAMUSCULAR; INTRAVENOUS ONCE
Status: COMPLETED | OUTPATIENT
Start: 2022-10-11 | End: 2022-10-11

## 2022-10-11 RX ORDER — IBUPROFEN 800 MG/1
800 TABLET ORAL ONCE
Status: COMPLETED | OUTPATIENT
Start: 2022-10-11 | End: 2022-10-11

## 2022-10-11 RX ORDER — ACETAMINOPHEN 500 MG
1000 TABLET ORAL ONCE
Status: COMPLETED | OUTPATIENT
Start: 2022-10-11 | End: 2022-10-11

## 2022-10-11 RX ADMIN — SODIUM CHLORIDE 1000 ML: 9 INJECTION, SOLUTION INTRAVENOUS at 06:50

## 2022-10-11 RX ADMIN — IBUPROFEN 800 MG: 800 TABLET, FILM COATED ORAL at 06:50

## 2022-10-11 RX ADMIN — ONDANSETRON 4 MG: 2 INJECTION INTRAMUSCULAR; INTRAVENOUS at 06:49

## 2022-10-11 RX ADMIN — ACETAMINOPHEN 1000 MG: 500 TABLET, FILM COATED ORAL at 06:50

## 2022-10-11 NOTE — ED PROVIDER NOTES
Subjective   History of Present Illness  Patient is a pleasant 52-year-old male who presents today with flulike/COVID-like symptoms.  He states that his son was diagnosed with COVID last week.  The patient been feeling well until he woke up around 2 or 3 AM this morning with significant chills which transition and body aches and nausea along with a unwell feeling.  Denies chest pain, shortness of breath, or abdominal pain.  He states that he has a periumbilical hernia which is at its baseline.  He has a appointment with surgery within the next couple months to address this.  Again, he complains of a nausea but denies vomiting or diarrhea.  Denies similar episodes in the past.    Flu Symptoms  Presenting symptoms: fatigue, fever, headache, myalgias, nausea and sore throat    Presenting symptoms: no diarrhea, no shortness of breath and no vomiting    Severity:  Moderate  Onset quality:  Sudden  Progression:  Worsening  Chronicity:  New  Relieved by:  Nothing  Worsened by:  Nothing  Ineffective treatments:  Rest  Associated symptoms: chills, decreased appetite, decreased physical activity, ear pain and nasal congestion    Associated symptoms: no mental status change, no neck stiffness and no syncope        Review of Systems   Constitutional: Positive for chills, decreased appetite, fatigue and fever.   HENT: Positive for congestion, ear pain and sore throat.    Respiratory: Negative for shortness of breath.    Gastrointestinal: Positive for nausea. Negative for diarrhea and vomiting.   Musculoskeletal: Positive for myalgias. Negative for neck stiffness.   Neurological: Positive for headaches.   All other systems reviewed and are negative.      Past Medical History:   Diagnosis Date   • Acute conjunctivitis    • Disc degeneration, lumbar    • Hypertension    • Peroneal muscular atrophy    • Skin candidiasis        Allergies   Allergen Reactions   • Rofecoxib Rash     (Vioxx)       History reviewed. No pertinent surgical  history.    Family History   Problem Relation Age of Onset   • Rheum arthritis Mother    • Diabetes Father        Social History     Socioeconomic History   • Marital status:    Tobacco Use   • Smoking status: Never   • Smokeless tobacco: Never   Vaping Use   • Vaping Use: Never used   Substance and Sexual Activity   • Alcohol use: Yes   • Drug use: No   • Sexual activity: Defer           Objective   Physical Exam  Vitals and nursing note reviewed.   Constitutional:       General: He is not in acute distress.     Appearance: He is ill-appearing. He is not toxic-appearing or diaphoretic.   HENT:      Head: Normocephalic and atraumatic.      Nose: Nose normal.      Mouth/Throat:      Mouth: Mucous membranes are dry.      Pharynx: Oropharynx is clear.   Eyes:      Extraocular Movements: Extraocular movements intact.      Conjunctiva/sclera: Conjunctivae normal.      Pupils: Pupils are equal, round, and reactive to light.   Cardiovascular:      Rate and Rhythm: Normal rate and regular rhythm.      Pulses: Normal pulses.      Heart sounds: Normal heart sounds. No murmur heard.    No friction rub. No gallop.   Pulmonary:      Effort: Pulmonary effort is normal. No respiratory distress.      Breath sounds: Normal breath sounds. No wheezing or rhonchi.   Abdominal:      General: Bowel sounds are normal. There is no distension.      Palpations: Abdomen is soft.      Tenderness: There is no abdominal tenderness. There is no guarding or rebound.   Musculoskeletal:         General: No swelling, deformity or signs of injury. Normal range of motion.      Cervical back: Normal range of motion.   Skin:     General: Skin is warm and dry.      Capillary Refill: Capillary refill takes less than 2 seconds.   Neurological:      General: No focal deficit present.      Mental Status: He is alert and oriented to person, place, and time. Mental status is at baseline.   Psychiatric:         Behavior: Behavior normal.         Thought  Content: Thought content normal.         Procedures           ED Course  ED Course as of 10/13/22 0708   Tue Oct 11, 2022   0937 Take Tylenol for fever control.  Stay hydrated.  Rest.  Monitor your oxygen levels if you are feeling short of breath.  As we discussed, return to the emergency department if symptoms worsen or other concerns arise. [CP]   0938 Patient is feeling much better following treatment.  He is comfortable with discharge at this time.  He will continue to take Tylenol for symptom control.  He is not a candidate for Paxil Lazaro or antivirals pack Slo-Bid.  He is already received his immunizations and is not extremely high risk.  He does have diabetes and obesity, which would be his risk factors.  I discussed the case with the pharmacist.  As he is on Xarelto he has a relative contraindication in his particular case the risk of the antiviral likely outweighs the benefit.  He will monitor symptoms closely and have a low threshold to return to the emergency department if symptoms worsen or other concerns arise. [CP]      ED Course User Index  [CP] Harjit Dangelo,       ,  No results found for this or any previous visit (from the past 24 hour(s)).  Note: In addition to lab results from this visit, the labs listed above may include labs taken at another facility or during a different encounter within the last 24 hours. Please correlate lab times with ED admission and discharge times for further clarification of the services performed during this visit.    XR Chest 1 View   Final Result   IMPRESSION :    No acute process.[       This report was finalized on 10/11/2022 8:07 AM by Javi Dean.            Vitals:    10/11/22 0659 10/11/22 0700 10/11/22 0900 10/11/22 0930   BP:  150/90 134/73 137/74   BP Location:       Patient Position:       Pulse: 105 106 105 100   Resp:       Temp:       TempSrc:       SpO2: 95% 93% 94% 96%   Weight:       Height:         Medications   acetaminophen (TYLENOL) tablet  1,000 mg (1,000 mg Oral Given 10/11/22 0650)   ibuprofen (ADVIL,MOTRIN) tablet 800 mg (800 mg Oral Given 10/11/22 0650)   sodium chloride 0.9 % bolus 1,000 mL (0 mL Intravenous Stopped 10/11/22 0826)   ondansetron (ZOFRAN) injection 4 mg (4 mg Intravenous Given 10/11/22 0649)     ECG/EMG Results (last 24 hours)     ** No results found for the last 24 hours. **        No orders to display      Latest Reference Range & Units 10/11/22 06:55 10/11/22 07:21   Glucose 65 - 99 mg/dL 114 (H)    Sodium 136 - 145 mmol/L 135 (L)    Potassium 3.5 - 5.2 mmol/L 3.9    CO2 22.0 - 29.0 mmol/L 24.0    Chloride 98 - 107 mmol/L 100    Anion Gap 5.0 - 15.0 mmol/L 11.0    Creatinine 0.76 - 1.27 mg/dL 0.71 (L)    BUN 6 - 20 mg/dL 9    BUN/Creatinine Ratio 7.0 - 25.0  12.7    Calcium 8.6 - 10.5 mg/dL 8.9    eGFR >60.0 mL/min/1.73 111.8    Alkaline Phosphatase 39 - 117 U/L 55    Total Protein 6.0 - 8.5 g/dL 7.1    ALT (SGPT) 1 - 41 U/L 20    AST (SGOT) 1 - 40 U/L 18    Total Bilirubin 0.0 - 1.2 mg/dL 0.4    Albumin 3.50 - 5.20 g/dL 4.00    Globulin gm/dL 3.1    A/G Ratio g/dL 1.3    Lipase 13 - 60 U/L 29    WBC 3.40 - 10.80 10*3/mm3 5.28    RBC 4.14 - 5.80 10*6/mm3 5.04    Hemoglobin 13.0 - 17.7 g/dL 14.6    Hematocrit 37.5 - 51.0 % 43.4    RDW 12.3 - 15.4 % 14.1    MCV 79.0 - 97.0 fL 86.1    MCH 26.6 - 33.0 pg 29.0    MCHC 31.5 - 35.7 g/dL 33.6    MPV 6.0 - 12.0 fL 12.4 (H)    Platelets 140 - 450 10*3/mm3 174    RDW-SD 37.0 - 54.0 fl 44.6    Neutrophil Rel % 42.7 - 76.0 % 83.6 (H)    Lymphocyte Rel % 19.6 - 45.3 % 5.3 (L)    Monocyte Rel % 5.0 - 12.0 % 7.6    Eosinophil Rel % 0.3 - 6.2 % 2.5    Basophil Rel % 0.0 - 1.5 % 0.6    Immature Granulocyte Rel % 0.0 - 0.5 % 0.4    Neutrophils Absolute 1.70 - 7.00 10*3/mm3 4.42    Lymphocytes Absolute 0.70 - 3.10 10*3/mm3 0.28 (L)    Monocytes Absolute 0.10 - 0.90 10*3/mm3 0.40    Eosinophils Absolute 0.00 - 0.40 10*3/mm3 0.13    Basophils Absolute 0.00 - 0.20 10*3/mm3 0.03    Immature Grans,  Absolute 0.00 - 0.05 10*3/mm3 0.02    nRBC 0.0 - 0.2 /100 WBC 0.0    Color, UA Yellow, Straw   Yellow   Appearance, UA Clear   Clear   Specific Gravity, UA 1.001 - 1.030   1.017   pH, UA 5.0 - 8.0   6.0   Glucose Negative   Negative   Ketones, UA Negative   Negative   Blood, UA Negative   Negative   Nitrite, UA Negative   Negative   Leukocytes, UA Negative   Negative   Protein, UA Negative   Negative   Bilirubin, UA Negative   Negative   Urobilinogen, UA 0.2 - 1.0 E.U./dL   0.2 E.U./dL   (H): Data is abnormally high  (L): Data is abnormally low                                       MDM  Pt feels dramatically better following fever control.  He will follow up with his PCP and have a low threshold to return to the ED if symptoms persist, worsen, or other concerns arise.      Final diagnoses:   COVID-19       ED Disposition  ED Disposition     ED Disposition   Discharge    Condition   Stable    Comment   --           DISCHARGE    Patient discharged in stable condition.    Reviewed implications of results, diagnosis, meds, responsibility to follow up, warning signs and symptoms of possible worsening, potential complications and reasons to return to ER.    Patient/Family voiced understanding of above instructions.    Discussed plan for discharge, as there is no emergent indication for admission.  Pt/family is agreeable and understands need for follow up and possible repeat testing.  Pt/family is aware that discharge does not mean that nothing is wrong but that it indicates no emergency is currently present that requires admission and they must continue care with follow-up as given below or with a physician of their choice.     FOLLOW-UP  Patrick Chan MD  [address] [place] [id number] [phone]    Schedule an appointment as soon as possible for a visit       [facility] Emergency Department  [address] [facility] [address] [phone]    If symptoms  worsen         Medication List      No changes were made to your prescriptions during this visit.            Harjit Dangelo,   10/13/22 0755

## 2022-11-09 ENCOUNTER — APPOINTMENT (OUTPATIENT)
Dept: PREADMISSION TESTING | Facility: HOSPITAL | Age: 52
End: 2022-11-09

## 2022-11-15 DIAGNOSIS — I10 ESSENTIAL HYPERTENSION: ICD-10-CM

## 2022-11-15 RX ORDER — BENAZEPRIL HYDROCHLORIDE 10 MG/1
10 TABLET ORAL DAILY
Qty: 90 TABLET | Refills: 0 | Status: SHIPPED | OUTPATIENT
Start: 2022-11-15 | End: 2022-12-22 | Stop reason: SDUPTHER

## 2022-12-22 DIAGNOSIS — I10 ESSENTIAL HYPERTENSION: ICD-10-CM

## 2022-12-22 RX ORDER — BENAZEPRIL HYDROCHLORIDE 10 MG/1
10 TABLET ORAL DAILY
Qty: 90 TABLET | Refills: 0 | Status: SHIPPED | OUTPATIENT
Start: 2022-12-22 | End: 2023-02-17

## 2023-02-17 DIAGNOSIS — I10 ESSENTIAL HYPERTENSION: ICD-10-CM

## 2023-02-17 RX ORDER — BENAZEPRIL HYDROCHLORIDE 10 MG/1
TABLET ORAL
Qty: 30 TABLET | Refills: 2 | Status: SHIPPED | OUTPATIENT
Start: 2023-02-17

## 2023-04-03 RX ORDER — SILDENAFIL 100 MG/1
100 TABLET, FILM COATED ORAL DAILY PRN
Qty: 30 TABLET | Refills: 0 | Status: SHIPPED | OUTPATIENT
Start: 2023-04-03

## 2023-04-12 ENCOUNTER — PRE-ADMISSION TESTING (OUTPATIENT)
Dept: PREADMISSION TESTING | Facility: HOSPITAL | Age: 53
End: 2023-04-12
Payer: COMMERCIAL

## 2023-04-12 VITALS — HEIGHT: 73 IN | BODY MASS INDEX: 41.75 KG/M2 | WEIGHT: 315 LBS

## 2023-04-12 LAB
ANION GAP SERPL CALCULATED.3IONS-SCNC: 11 MMOL/L (ref 5–15)
BUN SERPL-MCNC: 14 MG/DL (ref 6–20)
BUN/CREAT SERPL: 25 (ref 7–25)
CALCIUM SPEC-SCNC: 9.1 MG/DL (ref 8.6–10.5)
CHLORIDE SERPL-SCNC: 104 MMOL/L (ref 98–107)
CO2 SERPL-SCNC: 23 MMOL/L (ref 22–29)
CREAT SERPL-MCNC: 0.56 MG/DL (ref 0.76–1.27)
DEPRECATED RDW RBC AUTO: 42.2 FL (ref 37–54)
EGFRCR SERPLBLD CKD-EPI 2021: 120.1 ML/MIN/1.73
ERYTHROCYTE [DISTWIDTH] IN BLOOD BY AUTOMATED COUNT: 13.5 % (ref 12.3–15.4)
GLUCOSE SERPL-MCNC: 91 MG/DL (ref 65–99)
HCT VFR BLD AUTO: 46.7 % (ref 37.5–51)
HGB BLD-MCNC: 15.7 G/DL (ref 13–17.7)
INR PPP: 0.94 (ref 0.84–1.13)
MCH RBC QN AUTO: 28.8 PG (ref 26.6–33)
MCHC RBC AUTO-ENTMCNC: 33.6 G/DL (ref 31.5–35.7)
MCV RBC AUTO: 85.7 FL (ref 79–97)
PLATELET # BLD AUTO: 232 10*3/MM3 (ref 140–450)
PMV BLD AUTO: 11.6 FL (ref 6–12)
POTASSIUM SERPL-SCNC: 4.1 MMOL/L (ref 3.5–5.2)
PROTHROMBIN TIME: 12.5 SECONDS (ref 11.4–14.4)
QT INTERVAL: 422 MS
QTC INTERVAL: 438 MS
RBC # BLD AUTO: 5.45 10*6/MM3 (ref 4.14–5.8)
SODIUM SERPL-SCNC: 138 MMOL/L (ref 136–145)
WBC NRBC COR # BLD: 6.11 10*3/MM3 (ref 3.4–10.8)

## 2023-04-12 PROCEDURE — 85027 COMPLETE CBC AUTOMATED: CPT

## 2023-04-12 PROCEDURE — 80048 BASIC METABOLIC PNL TOTAL CA: CPT

## 2023-04-12 PROCEDURE — 93005 ELECTROCARDIOGRAM TRACING: CPT

## 2023-04-12 PROCEDURE — 36415 COLL VENOUS BLD VENIPUNCTURE: CPT

## 2023-04-12 PROCEDURE — 85610 PROTHROMBIN TIME: CPT

## 2023-04-12 NOTE — PAT
An arrival time for procedure was not provided during PAT visit. If patient had any questions or concerns about their arrival time, they were instructed to contact their surgeon/physician.  Additionally, if the patient referred to an arrival time that was acquired from their my chart account, patient was encouraged to verify that time with their surgeon/physician. Arrival times are NOT provided in Pre Admission Testing Department.    Patient viewed general PAT education video as instructed in their preoperative information received from their surgeon.  Patient stated the general PAT education video was viewed in its entirety and survey completed.  Copies of PAT general education handouts (Incentive Spirometry, Meds to Beds Program, Patient Belongings, Pre-op skin preparation instructions, Blood Glucose testing, Visitor policy, Surgery FAQ, Code H) distributed to patient if not printed. Education related to the PAT pass and skin preparation for surgery (if applicable) completed in PAT as a reinforcement to PAT education video. Patient instructed to return PAT pass provided today as well as completed skin preparation sheet (if applicable) on the day of procedure.     Additionally if patient had not viewed video yet but intended to view it at home or in our waiting area, then referred them to the handout with QR code/link provided during PAT visit.  Instructed patient to complete survey after viewing the video in its entirety.  Encouraged patient/family to read PAT general education handouts thoroughly and notify PAT staff with any questions or concerns. Patient verbalized understanding of all information and priority content.    Patient denies any current skin issues.     Patient to apply Chlorhexadine wipes  to surgical area (as instructed) the night before procedure and the AM of procedure. Wipes provided.    Post-Surgery Information Instruction Sheet given to patient during Pre-Admission Testing Visit with verbal  instructions to patient to return with PAT PASS on the day of surgery. Additionally, encouraged patient to review the information provided.    Per Anesthesia Request, patient instructed not to take their ACE/ARB medications on the AM of surgery.    Bactroban to be applied to each nostril during PAT visit if surgery the following day.  If surgery NOT the following day, then Bactroban supplied to patient with instructions both written and verbally to insert into each nares the night before surgery.

## 2023-04-18 ENCOUNTER — ANESTHESIA EVENT (OUTPATIENT)
Dept: PERIOP | Facility: HOSPITAL | Age: 53
End: 2023-04-18
Payer: COMMERCIAL

## 2023-04-18 RX ORDER — SODIUM CHLORIDE 0.9 % (FLUSH) 0.9 %
10 SYRINGE (ML) INJECTION AS NEEDED
Status: CANCELLED | OUTPATIENT
Start: 2023-04-18

## 2023-04-18 RX ORDER — SODIUM CHLORIDE 9 MG/ML
40 INJECTION, SOLUTION INTRAVENOUS AS NEEDED
Status: CANCELLED | OUTPATIENT
Start: 2023-04-18

## 2023-04-18 RX ORDER — FAMOTIDINE 10 MG/ML
20 INJECTION, SOLUTION INTRAVENOUS ONCE
Status: CANCELLED | OUTPATIENT
Start: 2023-04-18 | End: 2023-04-18

## 2023-04-18 RX ORDER — SODIUM CHLORIDE 0.9 % (FLUSH) 0.9 %
10 SYRINGE (ML) INJECTION EVERY 12 HOURS SCHEDULED
Status: CANCELLED | OUTPATIENT
Start: 2023-04-18

## 2023-04-18 NOTE — ANESTHESIA PREPROCEDURE EVALUATION
Anesthesia Evaluation     Patient summary reviewed and Nursing notes reviewed   NPO Solid Status: > 8 hours  NPO Liquid Status: > 2 hours           Airway   Comment: Intubated using glidescope for last surgery at OSH  Dental      Pulmonary    Cardiovascular     ECG reviewed    (+) hypertension,       Neuro/Psych  (+) psychiatric history PTSD and Anxiety,      ROS Comment: Charcot Kathleen Tooth  GI/Hepatic/Renal/Endo    (+) morbid obesity,  diabetes mellitus type 2,     Musculoskeletal     Abdominal    Substance History      OB/GYN          Other   arthritis,                    Anesthesia Plan    ASA 3     general with block     intravenous induction     Anesthetic plan, risks, benefits, and alternatives have been provided, discussed and informed consent has been obtained with: patient.    Plan discussed with CRNA.        CODE STATUS:

## 2023-04-19 ENCOUNTER — HOSPITAL ENCOUNTER (OUTPATIENT)
Facility: HOSPITAL | Age: 53
Setting detail: SURGERY ADMIT
Discharge: HOME | End: 2023-04-19
Attending: SURGERY | Admitting: SURGERY
Payer: COMMERCIAL

## 2023-04-19 ENCOUNTER — ANESTHESIA (OUTPATIENT)
Dept: PERIOP | Facility: HOSPITAL | Age: 53
End: 2023-04-19
Payer: COMMERCIAL

## 2023-04-19 ENCOUNTER — ANESTHESIA EVENT CONVERTED (OUTPATIENT)
Dept: ANESTHESIOLOGY | Facility: HOSPITAL | Age: 53
End: 2023-04-19
Payer: COMMERCIAL

## 2023-04-19 VITALS
BODY MASS INDEX: 41.75 KG/M2 | OXYGEN SATURATION: 97 % | DIASTOLIC BLOOD PRESSURE: 85 MMHG | HEIGHT: 73 IN | TEMPERATURE: 97.6 F | RESPIRATION RATE: 18 BRPM | SYSTOLIC BLOOD PRESSURE: 130 MMHG | HEART RATE: 60 BPM | WEIGHT: 315 LBS

## 2023-04-19 LAB — GLUCOSE BLDC GLUCOMTR-MCNC: 102 MG/DL (ref 70–130)

## 2023-04-19 PROCEDURE — 25010000002 ONDANSETRON PER 1 MG: Performed by: NURSE ANESTHETIST, CERTIFIED REGISTERED

## 2023-04-19 PROCEDURE — 82962 GLUCOSE BLOOD TEST: CPT

## 2023-04-19 PROCEDURE — 25010000002 FENTANYL CITRATE (PF) 50 MCG/ML SOLUTION

## 2023-04-19 PROCEDURE — C1781 MESH (IMPLANTABLE): HCPCS | Performed by: SURGERY

## 2023-04-19 PROCEDURE — 25010000002 DEXAMETHASONE SODIUM PHOSPHATE 10 MG/ML SOLUTION: Performed by: NURSE ANESTHETIST, CERTIFIED REGISTERED

## 2023-04-19 PROCEDURE — 25010000002 PROPOFOL 10 MG/ML EMULSION: Performed by: NURSE ANESTHETIST, CERTIFIED REGISTERED

## 2023-04-19 PROCEDURE — 25010000002 MIDAZOLAM PER 1 MG: Performed by: NURSE ANESTHETIST, CERTIFIED REGISTERED

## 2023-04-19 PROCEDURE — 25010000002 DROPERIDOL PER 5 MG

## 2023-04-19 PROCEDURE — 25010000002 HYDROMORPHONE 1 MG/ML SOLUTION

## 2023-04-19 PROCEDURE — 25010000002 CEFAZOLIN PER 500 MG: Performed by: SURGERY

## 2023-04-19 PROCEDURE — 25010000002 FENTANYL CITRATE (PF) 100 MCG/2ML SOLUTION: Performed by: NURSE ANESTHETIST, CERTIFIED REGISTERED

## 2023-04-19 DEVICE — VENTRALIGHT ST MESH WITH ECHO 2 POSITIONING SYSTEM 11 CM (4.5)" CIRCLE
Type: IMPLANTABLE DEVICE | Site: ABDOMEN | Status: FUNCTIONAL
Brand: VENTRALIGHT ST MESH WITH ECHO 2 POSITIONING SYSTEM

## 2023-04-19 DEVICE — DEV CONTRL TISS STRATAFIX SYMM PDS PLUS VIL CT-1 45CM: Type: IMPLANTABLE DEVICE | Site: ABDOMEN | Status: FUNCTIONAL

## 2023-04-19 DEVICE — DEV CONTRL TISS STRATAFIX SPIRAL PDS PLS CT1 2-0 45CM: Type: IMPLANTABLE DEVICE | Site: ABDOMEN | Status: FUNCTIONAL

## 2023-04-19 RX ORDER — FENTANYL CITRATE 50 UG/ML
50 INJECTION, SOLUTION INTRAMUSCULAR; INTRAVENOUS
Status: DISCONTINUED | OUTPATIENT
Start: 2023-04-19 | End: 2023-04-19 | Stop reason: HOSPADM

## 2023-04-19 RX ORDER — DEXMEDETOMIDINE HYDROCHLORIDE 100 UG/ML
INJECTION, SOLUTION INTRAVENOUS AS NEEDED
Status: DISCONTINUED | OUTPATIENT
Start: 2023-04-19 | End: 2023-04-19 | Stop reason: SURG

## 2023-04-19 RX ORDER — DEXAMETHASONE SODIUM PHOSPHATE 10 MG/ML
INJECTION, SOLUTION INTRAMUSCULAR; INTRAVENOUS
Status: COMPLETED | OUTPATIENT
Start: 2023-04-19 | End: 2023-04-19

## 2023-04-19 RX ORDER — GLYCOPYRROLATE 0.2 MG/ML
INJECTION INTRAMUSCULAR; INTRAVENOUS AS NEEDED
Status: DISCONTINUED | OUTPATIENT
Start: 2023-04-19 | End: 2023-04-19 | Stop reason: SURG

## 2023-04-19 RX ORDER — SODIUM CHLORIDE 9 MG/ML
40 INJECTION, SOLUTION INTRAVENOUS AS NEEDED
Status: DISCONTINUED | OUTPATIENT
Start: 2023-04-19 | End: 2023-04-19 | Stop reason: HOSPADM

## 2023-04-19 RX ORDER — MIDAZOLAM HYDROCHLORIDE 1 MG/ML
1 INJECTION INTRAMUSCULAR; INTRAVENOUS
Status: DISCONTINUED | OUTPATIENT
Start: 2023-04-19 | End: 2023-04-19 | Stop reason: HOSPADM

## 2023-04-19 RX ORDER — DROPERIDOL 2.5 MG/ML
0.62 INJECTION, SOLUTION INTRAMUSCULAR; INTRAVENOUS ONCE AS NEEDED
Status: DISCONTINUED | OUTPATIENT
Start: 2023-04-19 | End: 2023-04-19 | Stop reason: HOSPADM

## 2023-04-19 RX ORDER — MAGNESIUM HYDROXIDE 1200 MG/15ML
LIQUID ORAL AS NEEDED
Status: DISCONTINUED | OUTPATIENT
Start: 2023-04-19 | End: 2023-04-19 | Stop reason: HOSPADM

## 2023-04-19 RX ORDER — PROMETHAZINE HYDROCHLORIDE 25 MG/1
25 TABLET ORAL ONCE AS NEEDED
Status: DISCONTINUED | OUTPATIENT
Start: 2023-04-19 | End: 2023-04-19 | Stop reason: HOSPADM

## 2023-04-19 RX ORDER — SODIUM CHLORIDE 0.9 % (FLUSH) 0.9 %
3-10 SYRINGE (ML) INJECTION AS NEEDED
Status: DISCONTINUED | OUTPATIENT
Start: 2023-04-19 | End: 2023-04-19 | Stop reason: HOSPADM

## 2023-04-19 RX ORDER — NALOXONE HCL 0.4 MG/ML
0.4 VIAL (ML) INJECTION AS NEEDED
Status: DISCONTINUED | OUTPATIENT
Start: 2023-04-19 | End: 2023-04-19 | Stop reason: HOSPADM

## 2023-04-19 RX ORDER — MEPERIDINE HYDROCHLORIDE 25 MG/ML
12.5 INJECTION INTRAMUSCULAR; INTRAVENOUS; SUBCUTANEOUS
Status: DISCONTINUED | OUTPATIENT
Start: 2023-04-19 | End: 2023-04-19 | Stop reason: HOSPADM

## 2023-04-19 RX ORDER — CEFAZOLIN SODIUM IN 0.9 % NACL 3 G/100 ML
3 INTRAVENOUS SOLUTION, PIGGYBACK (ML) INTRAVENOUS ONCE
Status: COMPLETED | OUTPATIENT
Start: 2023-04-19 | End: 2023-04-19

## 2023-04-19 RX ORDER — DROPERIDOL 2.5 MG/ML
INJECTION, SOLUTION INTRAMUSCULAR; INTRAVENOUS
Status: COMPLETED
Start: 2023-04-19 | End: 2023-04-19

## 2023-04-19 RX ORDER — LABETALOL HYDROCHLORIDE 5 MG/ML
5 INJECTION, SOLUTION INTRAVENOUS
Status: DISCONTINUED | OUTPATIENT
Start: 2023-04-19 | End: 2023-04-19 | Stop reason: HOSPADM

## 2023-04-19 RX ORDER — LIDOCAINE HYDROCHLORIDE 10 MG/ML
INJECTION, SOLUTION EPIDURAL; INFILTRATION; INTRACAUDAL; PERINEURAL AS NEEDED
Status: DISCONTINUED | OUTPATIENT
Start: 2023-04-19 | End: 2023-04-19 | Stop reason: SURG

## 2023-04-19 RX ORDER — ROCURONIUM BROMIDE 10 MG/ML
INJECTION, SOLUTION INTRAVENOUS AS NEEDED
Status: DISCONTINUED | OUTPATIENT
Start: 2023-04-19 | End: 2023-04-19 | Stop reason: SURG

## 2023-04-19 RX ORDER — HYDROCODONE BITARTRATE AND ACETAMINOPHEN 5; 325 MG/1; MG/1
1 TABLET ORAL ONCE AS NEEDED
Status: DISCONTINUED | OUTPATIENT
Start: 2023-04-19 | End: 2023-04-19 | Stop reason: HOSPADM

## 2023-04-19 RX ORDER — IPRATROPIUM BROMIDE AND ALBUTEROL SULFATE 2.5; .5 MG/3ML; MG/3ML
3 SOLUTION RESPIRATORY (INHALATION) ONCE AS NEEDED
Status: DISCONTINUED | OUTPATIENT
Start: 2023-04-19 | End: 2023-04-19 | Stop reason: HOSPADM

## 2023-04-19 RX ORDER — OXYCODONE HYDROCHLORIDE AND ACETAMINOPHEN 5; 325 MG/1; MG/1
1 TABLET ORAL EVERY 4 HOURS PRN
Qty: 18 TABLET | Refills: 0 | Status: SHIPPED | OUTPATIENT
Start: 2023-04-19

## 2023-04-19 RX ORDER — FENTANYL CITRATE 50 UG/ML
INJECTION, SOLUTION INTRAMUSCULAR; INTRAVENOUS AS NEEDED
Status: DISCONTINUED | OUTPATIENT
Start: 2023-04-19 | End: 2023-04-19 | Stop reason: SURG

## 2023-04-19 RX ORDER — SODIUM CHLORIDE 0.9 % (FLUSH) 0.9 %
3 SYRINGE (ML) INJECTION EVERY 12 HOURS SCHEDULED
Status: DISCONTINUED | OUTPATIENT
Start: 2023-04-19 | End: 2023-04-19 | Stop reason: HOSPADM

## 2023-04-19 RX ORDER — PROPOFOL 10 MG/ML
VIAL (ML) INTRAVENOUS AS NEEDED
Status: DISCONTINUED | OUTPATIENT
Start: 2023-04-19 | End: 2023-04-19 | Stop reason: SURG

## 2023-04-19 RX ORDER — SODIUM CHLORIDE, SODIUM LACTATE, POTASSIUM CHLORIDE, CALCIUM CHLORIDE 600; 310; 30; 20 MG/100ML; MG/100ML; MG/100ML; MG/100ML
9 INJECTION, SOLUTION INTRAVENOUS CONTINUOUS
Status: DISCONTINUED | OUTPATIENT
Start: 2023-04-19 | End: 2023-04-19 | Stop reason: HOSPADM

## 2023-04-19 RX ORDER — ONDANSETRON 4 MG/1
4 TABLET, FILM COATED ORAL EVERY 8 HOURS PRN
Qty: 12 TABLET | Refills: 0 | Status: SHIPPED | OUTPATIENT
Start: 2023-04-19 | End: 2024-04-18

## 2023-04-19 RX ORDER — BUPIVACAINE HYDROCHLORIDE 2.5 MG/ML
INJECTION, SOLUTION EPIDURAL; INFILTRATION; INTRACAUDAL; PERINEURAL
Status: COMPLETED | OUTPATIENT
Start: 2023-04-19 | End: 2023-04-19

## 2023-04-19 RX ORDER — FAMOTIDINE 20 MG/1
20 TABLET, FILM COATED ORAL ONCE
Status: COMPLETED | OUTPATIENT
Start: 2023-04-19 | End: 2023-04-19

## 2023-04-19 RX ORDER — ONDANSETRON 2 MG/ML
INJECTION INTRAMUSCULAR; INTRAVENOUS AS NEEDED
Status: DISCONTINUED | OUTPATIENT
Start: 2023-04-19 | End: 2023-04-19 | Stop reason: SURG

## 2023-04-19 RX ORDER — LIDOCAINE HYDROCHLORIDE 10 MG/ML
0.5 INJECTION, SOLUTION EPIDURAL; INFILTRATION; INTRACAUDAL; PERINEURAL ONCE AS NEEDED
Status: COMPLETED | OUTPATIENT
Start: 2023-04-19 | End: 2023-04-19

## 2023-04-19 RX ORDER — ONDANSETRON 2 MG/ML
4 INJECTION INTRAMUSCULAR; INTRAVENOUS ONCE AS NEEDED
Status: DISCONTINUED | OUTPATIENT
Start: 2023-04-19 | End: 2023-04-19 | Stop reason: HOSPADM

## 2023-04-19 RX ORDER — FENTANYL CITRATE 50 UG/ML
INJECTION, SOLUTION INTRAMUSCULAR; INTRAVENOUS
Status: COMPLETED
Start: 2023-04-19 | End: 2023-04-19

## 2023-04-19 RX ORDER — PROMETHAZINE HYDROCHLORIDE 25 MG/1
25 SUPPOSITORY RECTAL ONCE AS NEEDED
Status: DISCONTINUED | OUTPATIENT
Start: 2023-04-19 | End: 2023-04-19 | Stop reason: HOSPADM

## 2023-04-19 RX ORDER — MIDAZOLAM HYDROCHLORIDE 1 MG/ML
INJECTION, SOLUTION INTRAMUSCULAR; INTRAVENOUS AS NEEDED
Status: DISCONTINUED | OUTPATIENT
Start: 2023-04-19 | End: 2023-04-19 | Stop reason: SURG

## 2023-04-19 RX ORDER — DROPERIDOL 2.5 MG/ML
0.62 INJECTION, SOLUTION INTRAMUSCULAR; INTRAVENOUS
Status: DISCONTINUED | OUTPATIENT
Start: 2023-04-19 | End: 2023-04-19 | Stop reason: HOSPADM

## 2023-04-19 RX ORDER — DOCUSATE SODIUM 100 MG/1
100 CAPSULE, LIQUID FILLED ORAL 2 TIMES DAILY
Qty: 30 CAPSULE | Refills: 1 | Status: SHIPPED | OUTPATIENT
Start: 2023-04-19 | End: 2024-04-18

## 2023-04-19 RX ORDER — HYDRALAZINE HYDROCHLORIDE 20 MG/ML
5 INJECTION INTRAMUSCULAR; INTRAVENOUS
Status: DISCONTINUED | OUTPATIENT
Start: 2023-04-19 | End: 2023-04-19 | Stop reason: HOSPADM

## 2023-04-19 RX ADMIN — PROPOFOL 200 MG: 10 INJECTION, EMULSION INTRAVENOUS at 07:41

## 2023-04-19 RX ADMIN — DEXMEDETOMIDINE HYDROCHLORIDE 8 MCG: 100 INJECTION, SOLUTION INTRAVENOUS at 09:14

## 2023-04-19 RX ADMIN — FENTANYL CITRATE 50 MCG: 0.05 INJECTION, SOLUTION INTRAMUSCULAR; INTRAVENOUS at 09:22

## 2023-04-19 RX ADMIN — DEXMEDETOMIDINE HYDROCHLORIDE 8 MCG: 100 INJECTION, SOLUTION INTRAVENOUS at 09:20

## 2023-04-19 RX ADMIN — LIDOCAINE HYDROCHLORIDE 0.5 ML: 10 INJECTION, SOLUTION EPIDURAL; INFILTRATION; INTRACAUDAL; PERINEURAL at 06:49

## 2023-04-19 RX ADMIN — MIDAZOLAM HYDROCHLORIDE 2 MG: 1 INJECTION, SOLUTION INTRAMUSCULAR; INTRAVENOUS at 07:34

## 2023-04-19 RX ADMIN — MUPIROCIN 1 APPLICATION: 20 OINTMENT TOPICAL at 06:56

## 2023-04-19 RX ADMIN — FENTANYL CITRATE 50 MCG: 50 INJECTION, SOLUTION INTRAMUSCULAR; INTRAVENOUS at 09:43

## 2023-04-19 RX ADMIN — DEXAMETHASONE SODIUM PHOSPHATE 4 MG: 10 INJECTION, SOLUTION INTRAMUSCULAR; INTRAVENOUS at 07:42

## 2023-04-19 RX ADMIN — Medication 0.5 MG: at 09:48

## 2023-04-19 RX ADMIN — SODIUM CHLORIDE, POTASSIUM CHLORIDE, SODIUM LACTATE AND CALCIUM CHLORIDE 9 ML/HR: 600; 310; 30; 20 INJECTION, SOLUTION INTRAVENOUS at 06:50

## 2023-04-19 RX ADMIN — SUGAMMADEX 200 MG: 100 INJECTION, SOLUTION INTRAVENOUS at 09:15

## 2023-04-19 RX ADMIN — Medication 0.5 MG: at 10:01

## 2023-04-19 RX ADMIN — LIDOCAINE HYDROCHLORIDE 50 MG: 10 INJECTION, SOLUTION EPIDURAL; INFILTRATION; INTRACAUDAL; PERINEURAL at 07:41

## 2023-04-19 RX ADMIN — ONDANSETRON 4 MG: 2 INJECTION INTRAMUSCULAR; INTRAVENOUS at 09:15

## 2023-04-19 RX ADMIN — BUPIVACAINE HYDROCHLORIDE 60 ML: 2.5 INJECTION, SOLUTION EPIDURAL; INFILTRATION; INTRACAUDAL; PERINEURAL at 07:42

## 2023-04-19 RX ADMIN — FENTANYL CITRATE 50 MCG: 0.05 INJECTION, SOLUTION INTRAMUSCULAR; INTRAVENOUS at 07:41

## 2023-04-19 RX ADMIN — DROPERIDOL 0.62 MG: 2.5 INJECTION, SOLUTION INTRAMUSCULAR; INTRAVENOUS at 10:14

## 2023-04-19 RX ADMIN — PROPOFOL 50 MG: 10 INJECTION, EMULSION INTRAVENOUS at 07:45

## 2023-04-19 RX ADMIN — ROCURONIUM BROMIDE 20 MG: 10 INJECTION INTRAVENOUS at 08:35

## 2023-04-19 RX ADMIN — GLYCOPYRROLATE 0.2 MG: 0.2 INJECTION INTRAMUSCULAR; INTRAVENOUS at 08:07

## 2023-04-19 RX ADMIN — ROCURONIUM BROMIDE 10 MG: 10 INJECTION INTRAVENOUS at 09:05

## 2023-04-19 RX ADMIN — DEXMEDETOMIDINE HYDROCHLORIDE 4 MCG: 100 INJECTION, SOLUTION INTRAVENOUS at 09:23

## 2023-04-19 RX ADMIN — HYDROMORPHONE HYDROCHLORIDE 0.5 MG: 1 INJECTION, SOLUTION INTRAMUSCULAR; INTRAVENOUS; SUBCUTANEOUS at 09:48

## 2023-04-19 RX ADMIN — FAMOTIDINE 20 MG: 20 TABLET, FILM COATED ORAL at 06:56

## 2023-04-19 RX ADMIN — ROCURONIUM BROMIDE 10 MG: 10 INJECTION INTRAVENOUS at 07:50

## 2023-04-19 RX ADMIN — HYDROMORPHONE HYDROCHLORIDE 0.5 MG: 1 INJECTION, SOLUTION INTRAMUSCULAR; INTRAVENOUS; SUBCUTANEOUS at 10:01

## 2023-04-19 RX ADMIN — ROCURONIUM BROMIDE 70 MG: 10 INJECTION INTRAVENOUS at 07:41

## 2023-04-19 RX ADMIN — CEFAZOLIN 3 G: 10 INJECTION, POWDER, FOR SOLUTION INTRAVENOUS at 07:44

## 2023-04-19 RX ADMIN — DEXMEDETOMIDINE HYDROCHLORIDE 4 MCG: 100 INJECTION, SOLUTION INTRAVENOUS at 08:30

## 2023-04-19 NOTE — OP NOTE
Operative Report    Patient Name:  Ming Swenson  YOB: 1970  [id number]    4/19/2023      PREOPERATIVE DIAGNOSIS: Incarcerated umbilical hernia, morbid obesity with BMI 44      POSTOPERATIVE DIAGNOSIS: Same        PROCEDURE PERFORMED:     1. Robotic Assisted Laparoscopic Incarcerated Umbilical Hernia Repair with Mesh, 2 cm      SURGEON: Leo Barlow MD      ASSISTANT: OSEI West    Assistant responsibilities: Yves assisted with dissection, retraction, exchange of instruments, insertion of the mesh, and closure.  His assistance was necessary and required for successful completion of the case       SPECIMENS: None       ESTIMATED BLOOD LOSS: 20 mL     ANESTHESIA: General with TAP blocks.        FINDINGS:  1. A 2 cm defect at the base of the umbilicus was encountered which contained some incarcerated omentum.  This was completely reduced.  The defect was closed primarily and then reinforced with an 11 cm Ventralight ST mesh in an intraperitoneal location  2.  Incidental note was made of a left inguinal hernia which was visible on laparoscopic exam     INDICATIONS:      This patient is a 52 y.o. male who presented to my clinic with complaints of pain and bulge surrounding the umbilicus. A history and physical exam was performed and found to be consistent with a incarcerated umbilical hernia. Pre-operative imaging including CT scan confirmed the diagnosis. The risks, benefits, and alternatives of the procedure were discussed with the patient and their family preoperatively and they agreed to proceed.          DESCRIPTION OF PROCEDURE:      After obtaining informed consent, the patient was taken to the operating room and placed in the supine position on the operating room table. General anesthesia was initiated. A Rubio catheter was placed. The abdomen was prepped and draped in the usual sterile fashion. A time-out was properly performed. Antibiotics were given preoperatively. SCDs were  properly placed on the patient and turned on. A block was performed by the Anesthesia service prior to the start of the case.     A small stab incision was made in the patient's left upper quadrant at Peterson's point.    Next utilizing a 5 mm 0 degree laparoscope as well as a 5 mm Optiview trocar, the abdomen was entered in the left upper quadrant at Peterson's point under direct laparoscopic visualization utilizing an Optiview technique.  Pneumoperitoneum was established to 15 mmHg.  The abdomen was surveyed with special attention to the viscera underlying the insertion site which was found to be free of injury.  We then placed a 12 mm da Long trocar in the left upper quadrant, an 8 mm da Long trocar in the left lower quadrant, and an additional 8 mm trocar in the left mid abdomen.  The 5 mm trocar was removed..  All of these were placed under direct laparoscopic visualization.  The da Long Xi robotic system was then brought to the patient's bedside and targeted to the abdominal wall.     Careful examination of the anterior abdominal wall demonstrated some omentum which was incarcerated within a hernia defect at the umbilicus.  This was reduced laparoscopically and any attachments to the hernia sac were released.  Following this the hernia defect was visible.  Next utilizing cautery scissors, the falciform ligament was taken down superior to the hernia defect such that there would be adequate room for direct apposition of mesh to the anterior abdominal wall superior to the defect.  Similarly the medial umbilical ligaments and preperitoneal fat were taken down inferior to the hernia defect in the same fashion.  Following this there was at least 5 cm surrounding the hernia defect for direct apposition of mesh to the abdominal wall.  Hemostasis was confirmed.      A ruler was brought into the abdominal cavity and the hernia defects were measured.  There was one hernia defect at the umbilicus which measured 2 cm. A #1  Stratafix PDS suture was then used to close the defect primarily.     At this point we confirmed that there was at least 5 cm of abdominal wall that was cleared surrounding the hernia defect for mesh overlap.  A 11 cm Ventralight ST hernia mesh with echo positioning system was then brought into the abdominal cavity through the 12 mm trocar.  The mesh was centered on the abdominal wall and hernia defects using the echo positioning system and a Hammad-Antonia device.  The mesh was then secured circumferentially on the outer edge to the abdominal wall using running 2-0 Stratafix PDS absorbable sutures.  With this completed the entire outer edge of the mesh was circumferentially secured to the abdominal wall, and the mesh was confirmed to lie in adequate position.  The echo positioning system was then removed.   We confirmed that the mesh lied in adequate position without any folding and confirmed hemostasis throughout the abdomen.     The da Long Xi robotic system was then undocked.  The fascia of the 12 mm trocar site was closed under direct laparoscopic visualization using a Hammad-Antonia device with an 0 Vicryl suture.  The remaining trocars were then removed under direct visualization and pneumoperitoneum was released.  All skin incisions were then closed using 4 Monocryl in subcuticular layer.  Mastisol and Steri-Strips were applied to each wound site with a clean dry dressing overlying this.     After the procedure, the patient was awakened, extubated, and taken to the postoperative anesthesia care unit for recovery. All needle, instrument, and lap counts were correct. I was personally present and performed all portions of the procedure. There were no immediate complications         Leo Barlow MD  4/19/2023  09:14 EDT

## 2023-04-19 NOTE — ANESTHESIA POSTPROCEDURE EVALUATION
Patient: Ming Swenson    Procedure Summary     Date: 04/19/23 Room / Location:  [facility]    Anesthesia Start: 0734 Anesthesia Stop: 0938    Procedure: ROBOTIC INCARCERATED UMBILICAL HERNIA  REPAIR WITH MESH (Abdomen) Diagnosis:     Surgeons: Leo Barlow MD Provider: Leonel Rashid MD    Anesthesia Type: general with block ASA Status: 3          Anesthesia Type: general with block    Vitals  Vitals Value Taken Time   BP     Temp     Pulse 67 04/19/23 0937   Resp     SpO2 98 % 04/19/23 0937   Vitals shown include unvalidated device data.        Post Anesthesia Care and Evaluation    Patient location during evaluation: PACU  Patient participation: complete - patient participated  Level of consciousness: awake and alert  Pain score: 2  Pain management: adequate    Airway patency: patent  Anesthetic complications: No anesthetic complications  PONV Status: none  Cardiovascular status: acceptable  Respiratory status: room air, spontaneous ventilation and nonlabored ventilation  Hydration status: acceptable

## 2023-04-19 NOTE — ANESTHESIA PROCEDURE NOTES
3 quad TAP      Patient reassessed immediately prior to procedure    Patient location during procedure: OR  Reason for block: at surgeon's request and post-op pain management  Performed by  CRNA/CAA: Ming Guillaume, CRISPIN  Assisted by: Marcos Figueroa CRNA  Preanesthetic Checklist  Completed: patient identified, IV checked, site marked, risks and benefits discussed, surgical consent, monitors and equipment checked, pre-op evaluation and timeout performed  Prep:  Pt Position: supine  Sterile barriers:cap, gloves, mask and washed/disinfected hands  Prep: ChloraPrep  Patient monitoring: blood pressure monitoring, continuous pulse oximetry and EKG  Procedure    Sedation: yes  Performed under: general  Guidance:ultrasound guided  Images:still images obtained, printed/placed on chart    Laterality:Bilateral  Block Type:TAP  Injection Technique:single-shot  Needle Type:short-bevel and echogenic  Needle Gauge:20 G  Resistance on Injection: none    Medications Used: dexamethasone sodium phosphate injection - Injection   4 mg - 4/19/2023 7:42:00 AM  bupivacaine PF (MARCAINE) 0.25 % injection - Injection   60 mL - 4/19/2023 7:42:00 AM      Medications  Comment:Block Injection:  LA dose divided between Right and Left block    Right side subcostal TAP (30 ml), Left side subcostal (15ml) and midaxillary TAP (15 ml)    Post Assessment  Injection Assessment: negative aspiration for heme, incremental injection and no paresthesia on injection  Patient Tolerance:comfortable throughout block  Complications:no  Additional Notes    Subcostal TAPs    A high-frequency linear transducer, with sterile cover, was placed sub-xiphoid to identify Linea Alba, right and left Rectus Abdominus Muscles (GABBI). The transducer was moved either right or left subcostally to identify the GABBI and the Transverse Abdominus Muscle (ESPOSITO). The insertion site was prepped in sterile fashion and then localized with 2-5 ml of 1% Lidocaine. Using  "ultrasound-guidance, a 20-gauge B-Torres 4\" Ultraplex 360 non-stimulating echogenic needle was advanced in plane, from medial to lateral, until the tip of the needle was in the fascial plane between the GABBI and ESPOSITO. 1-3ml of preservative free normal saline was used to hydro-dissect the fascial planes. After the fascial plane was verified, the local anesthetic (LA) was injected. The procedure was repeated on the opposite side for bilateral coverage. Aspiration every 5 ml to prevent intravascular injection. Injection was completed with negative aspiration of blood and negative intravascular injection. Injection pressures were normal with minimal resistance. The subcostal approach to the TAP nerve block ideally anesthetizes the intercostal nerves T6-T9.     Mid-Axillary/Lateral TAPs    A high-frequency linear transducer, with sterile cover, was placed in the midaxillary line between the ASIS and costal margin. The External Oblique Muscle (EOM), Internal Oblique Muscle (IOM), Transverse Abdominus Muscle (ESPOSITO), and Peritoneum were identified. The insertion site was prepped in sterile fashion and then localized with 2-5 ml of 1% Lidocaine. Using ultrasound-guidance, a 20-gauge B-Torres 4\" Ultraplex 360 non-stimulating echogenic needle was advanced in plane, from medial to lateral, until the tip of the needle was in the fascial plane between the IOM and ESPOSITO. 1-3ml of preservative free normal saline was used to hydro-dissect the fascial planes. After the fascial plane was verified, the local anesthetic (LA) was injected. The procedure was repeated on the opposite side for bilateral coverage. Aspiration every 5 ml to prevent intravascular injection. Injection was completed with negative aspiration of blood and negative intravascular injection. Injection pressures were normal with minimal resistance. Midaxillary TAPs should reach intercostal nerves T10- T11 and the subcostal nerve T12.            "

## 2023-04-19 NOTE — H&P
"Pre-Op H&P  Ming Swenson  [id number]  1970    Chief complaint: hernia    HPI:    Patient is a 52 y.o.male who presents with morbid obesity who presents with an paraumbilical hernia. He is scheduled for a laparoscopic, robotic assisted, umbilical hernia repair with mesh. He does not take blood thinners routinely.     Review of Systems:  General ROS: negative for chills, fever or skin lesions.  Cardiovascular ROS: no chest pain or dyspnea on exertion  Respiratory ROS: no cough, shortness of breath, or wheezing    Allergies:   Allergies   Allergen Reactions   • Rofecoxib Rash     (Vioxx)       Home Meds:    No current facility-administered medications on file prior to encounter.     No current outpatient medications on file prior to encounter.       PMH:   Past Medical History:   Diagnosis Date   • Acute conjunctivitis    • Anesthesia complication     PT REPORTS HX OF WAKING UP FIGHTING AFTER SURGERY D/T PTSD   • Diabetes mellitus     HX OF TYPE 2 PER PT, NO MEDS   • Disc degeneration, lumbar    • Hypertension    • Peroneal muscular atrophy    • PTSD (post-traumatic stress disorder)    • Skin candidiasis    • Ventral hernia      PSH:    Past Surgical History:   Procedure Laterality Date   • ANKLE FUSION Right    • EAR TUBES Left    • SPINAL FUSION     • TONSILLECTOMY      UVULA REMOVED       Immunization History:  Influenza: UTD  Pneumococcal: UTD  Tetanus: unsure    Social History:   Tobacco:   Social History     Tobacco Use   Smoking Status Never   Smokeless Tobacco Never      Alcohol:     Social History     Substance and Sexual Activity   Alcohol Use Not Currently       Vitals:           /95 (BP Location: Right arm, Patient Position: Lying)   Pulse 71   Temp 97.8 °F (36.6 °C) (Temporal)   Resp 18   Ht 185.4 cm (73\")   Wt (!) 152 kg (335 lb 8.6 oz)   SpO2 97%   BMI 44.27 kg/m²     Physical Exam:  General Appearance:    Alert, cooperative, no distress, appears stated age   Head:    " Normocephalic, without obvious abnormality, atraumatic   Lungs:     Clear to auscultation bilaterally, respirations unlabored    Heart:   Regular rate and rhythm, no murmur, rub or gallop    Abdomen:    Soft, nontender. No rigidity or guarding.    Breast Exam:    deferred   Genitalia:    deferred   Extremities:   Extremities normal, atraumatic, no cyanosis or edema   Skin:   Skin color, texture, turgor normal, no rashes or lesions   Neurologic:   Grossly intact   Results Review  LABS:  Lab Results   Component Value Date    WBC 6.11 04/12/2023    HGB 15.7 04/12/2023    HCT 46.7 04/12/2023    MCV 85.7 04/12/2023     04/12/2023    NEUTROABS 4.42 10/11/2022    GLUCOSE 91 04/12/2023    BUN 14 04/12/2023    CREATININE 0.56 (L) 04/12/2023    EGFRIFNONA 144 03/15/2021     04/12/2023    K 4.1 04/12/2023     04/12/2023    CO2 23.0 04/12/2023    CALCIUM 9.1 04/12/2023    ALBUMIN 4.00 10/11/2022    AST 18 10/11/2022    ALT 20 10/11/2022    BILITOT 0.4 10/11/2022    INR 0.94 04/12/2023       RADIOLOGY:  No radiology results for the last 3 days     I reviewed the patient's new imaging results and agree with the interpretation.     Impression: umbilical hernia    Plan: laparoscopic, robotic assisted, umbilical hernia repair with mesh    Yves Navarrete PA-C   4/19/2023   06:51 EDT

## 2023-04-19 NOTE — ANESTHESIA PROCEDURE NOTES
Airway  Urgency: elective    Date/Time: 4/19/2023 7:43 AM  Airway not difficult    General Information and Staff    Patient location during procedure: OR  SRNA: Varsha Kaba SRNA  Indications and Patient Condition  Indications for airway management: airway protection    Preoxygenated: yes  MILS maintained throughout  Mask difficulty assessment: 1 - vent by mask    Final Airway Details  Final airway type: endotracheal airway      Successful airway: ETT  Cuffed: yes   Successful intubation technique: video laryngoscopy  Endotracheal tube insertion site: oral  Blade: Becky  Blade size: 3  ETT size (mm): 7.5  Cormack-Lehane Classification: grade I - full view of glottis  Placement verified by: chest auscultation and capnometry   Cuff volume (mL): 7  Measured from: lips  ETT/EBT  to lips (cm): 21  Number of attempts at approach: 1  Assessment: lips, teeth, and gum same as pre-op and atraumatic intubation    Additional Comments  Negative epigastric sounds, Breath sound equal bilaterally with symmetric chest rise and fall

## 2023-04-25 LAB
QT INTERVAL: 422 MS
QTC INTERVAL: 438 MS

## 2023-05-26 DIAGNOSIS — I10 ESSENTIAL HYPERTENSION: ICD-10-CM

## 2023-05-26 RX ORDER — BENAZEPRIL HYDROCHLORIDE 10 MG/1
TABLET ORAL
Qty: 90 TABLET | Refills: 0 | Status: SHIPPED | OUTPATIENT
Start: 2023-05-26

## 2023-06-05 ENCOUNTER — OFFICE VISIT (OUTPATIENT)
Dept: FAMILY MEDICINE CLINIC | Facility: CLINIC | Age: 53
End: 2023-06-05
Payer: COMMERCIAL

## 2023-06-05 VITALS
HEIGHT: 73 IN | WEIGHT: 315 LBS | SYSTOLIC BLOOD PRESSURE: 122 MMHG | TEMPERATURE: 98 F | DIASTOLIC BLOOD PRESSURE: 84 MMHG | OXYGEN SATURATION: 98 % | HEART RATE: 73 BPM | BODY MASS INDEX: 41.75 KG/M2

## 2023-06-05 DIAGNOSIS — H00.014 HORDEOLUM EXTERNUM OF LEFT UPPER EYELID: Primary | ICD-10-CM

## 2023-06-05 DIAGNOSIS — E11.9 CONTROLLED TYPE 2 DIABETES MELLITUS WITHOUT COMPLICATION, WITHOUT LONG-TERM CURRENT USE OF INSULIN: ICD-10-CM

## 2023-06-05 RX ORDER — OFLOXACIN 3 MG/ML
1 SOLUTION/ DROPS OPHTHALMIC 4 TIMES DAILY
Qty: 10 ML | Refills: 0 | Status: SHIPPED | OUTPATIENT
Start: 2023-06-05

## 2023-06-05 NOTE — PROGRESS NOTES
"    Office Note     Name: Ming Swenson    : 1970     MRN: [id number]     Chief Complaint  Eye Problem (Possible stye on the L eye, red and tender )    Subjective     History of Present Illness:  Ming Swenson is a 52 y.o. male who presents today for left eye issues.  Patient reports that he has a stye in his left eye and it is tender and red.  He noticed it yesterday.  He has been using warm compresses to the area.  Denies any drainage.    Type 2 diabetes: I did discuss with patient that we need to repeat hemoglobin A1c as this has not been done on an a while and a urine microalbumin today and he reports that he no longer has diabetes.  He has been dieting and exercising and believes that his diabetes has been cured.  He is curious to see what his hemoglobin A1c is but declines when I offered to test it.        Objective     Past Medical History:   Diagnosis Date    Acute conjunctivitis     Anesthesia complication     PT REPORTS HX OF WAKING UP FIGHTING AFTER SURGERY D/T PTSD    Diabetes mellitus     HX OF TYPE 2 PER PT, NO MEDS    Disc degeneration, lumbar     Hypertension     Peroneal muscular atrophy     PTSD (post-traumatic stress disorder)     Skin candidiasis     Ventral hernia      Past Surgical History:   Procedure Laterality Date    ANKLE FUSION Right     EAR TUBES Left     SPINAL FUSION      TONSILLECTOMY      UVULA REMOVED    VENTRAL HERNIA REPAIR N/A 2023    Procedure: ROBOTIC INCARCERATED UMBILICAL HERNIA  REPAIR WITH MESH;  Surgeon: Leo Barlow MD;  Location: [facility];  Service: Robotics - [facility];  Laterality: N/A;     Family History   Problem Relation Age of Onset    Rheum arthritis Mother     Diabetes Father        Vital Signs  /84   Pulse 73   Temp 98 °F (36.7 °C) (Infrared)   Ht 185.4 cm (73\") Comment: Pt reported  Wt (!) 152 kg (336 lb 3.2 oz)   SpO2 98%   BMI 44.36 kg/m²   Estimated body mass index is 44.36 kg/m² as calculated from the following:    " "Height as of this encounter: 185.4 cm (73\").    Weight as of this encounter: 152 kg (336 lb 3.2 oz).    Physical Exam  Vitals reviewed.   Constitutional:       Appearance: Normal appearance.   HENT:      Head: Normocephalic and atraumatic.      Nose: Nose normal.   Eyes:      Comments: Mild conjunctivitis of left eye, light swelling of the upper lid with stye centrally located   Neurological:      Mental Status: He is alert.             Assessment and Plan     Diagnoses and all orders for this visit:    1. Hordeolum externum of left upper eyelid (Primary)  Assessment & Plan:  - Slight conjunctivitis of the left eye, so we will go ahead and start ofloxacin drops for 7 days  - I did discuss with patient using warm compresses daily, approximately 10 to 20 minutes at a time  - If no improvement in symptoms, advised to return to clinic     Orders:  -     ofloxacin (Ocuflox) 0.3 % ophthalmic solution; Administer 1 drop into the left eye 4 (Four) Times a Day. For 7 days  Dispense: 10 mL; Refill: 0    2. Controlled type 2 diabetes mellitus without complication, without long-term current use of insulin  Assessment & Plan:  - Discussed with patient that he needs a hemoglobin A1c, but he declines today  - Patient has been doing significant dietary and exercise modifications and believes that he has been cured of diabetes  - I did advise him to follow-up in 1 month for an annual physical and to get labs at that time, as well as a urine microalbumin            Follow Up  Return in about 1 month (around 7/5/2023) for Annual physical.    Alejandra Hernandez MD  "

## 2023-08-03 ENCOUNTER — TELEPHONE (OUTPATIENT)
Dept: FAMILY MEDICINE CLINIC | Facility: CLINIC | Age: 53
End: 2023-08-03

## 2023-08-03 NOTE — TELEPHONE ENCOUNTER
Caller: Ming Swenson    Relationship: Self    Best call back number: [phone]     What medication are you requesting: SILDENAFIL 100MG    If a prescription is needed, what is your preferred pharmacy and phone number: [facility] [id number] - [facility] [address] [place] [address] [phone] [facility] [phone]      Additional notes: PATIENT STATES THIS USED TO BE ON HIS MEDICATION LIST BUT IT IS NO LONGER AND HE WOULD LIKE A REFILL.

## 2023-08-12 PROCEDURE — 87186 SC STD MICRODIL/AGAR DIL: CPT | Performed by: FAMILY MEDICINE

## 2023-08-12 PROCEDURE — 87088 URINE BACTERIA CULTURE: CPT | Performed by: FAMILY MEDICINE

## 2023-08-12 PROCEDURE — 87086 URINE CULTURE/COLONY COUNT: CPT | Performed by: FAMILY MEDICINE

## 2023-08-17 ENCOUNTER — OFFICE VISIT (OUTPATIENT)
Dept: FAMILY MEDICINE CLINIC | Facility: CLINIC | Age: 53
End: 2023-08-17
Payer: COMMERCIAL

## 2023-08-17 VITALS
TEMPERATURE: 98.4 F | SYSTOLIC BLOOD PRESSURE: 144 MMHG | HEIGHT: 73 IN | OXYGEN SATURATION: 95 % | WEIGHT: 315 LBS | BODY MASS INDEX: 41.75 KG/M2 | HEART RATE: 66 BPM | DIASTOLIC BLOOD PRESSURE: 84 MMHG

## 2023-08-17 DIAGNOSIS — N41.1 CHRONIC PROSTATITIS: ICD-10-CM

## 2023-08-17 DIAGNOSIS — E11.9 CONTROLLED TYPE 2 DIABETES MELLITUS WITHOUT COMPLICATION, WITHOUT LONG-TERM CURRENT USE OF INSULIN: ICD-10-CM

## 2023-08-17 DIAGNOSIS — R30.0 DYSURIA: Primary | ICD-10-CM

## 2023-08-17 DIAGNOSIS — N40.1 LOWER URINARY TRACT SYMPTOMS DUE TO BENIGN PROSTATIC HYPERPLASIA: ICD-10-CM

## 2023-08-17 LAB
BILIRUB BLD-MCNC: NEGATIVE MG/DL
CLARITY, POC: CLEAR
COLOR UR: YELLOW
EXPIRATION DATE: NORMAL
GLUCOSE UR STRIP-MCNC: NEGATIVE MG/DL
HBA1C MFR BLD: 5.5 %
KETONES UR QL: NEGATIVE
LEUKOCYTE EST, POC: NEGATIVE
Lab: NORMAL
NITRITE UR-MCNC: NEGATIVE MG/ML
PH UR: 6 [PH] (ref 5–8)
POC CREATININE URINE: 50
POC MICROALBUMIN URINE: 10
PROT UR STRIP-MCNC: NEGATIVE MG/DL
RBC # UR STRIP: NEGATIVE /UL
SP GR UR: 1.01 (ref 1–1.03)
UROBILINOGEN UR QL: NORMAL

## 2023-08-17 PROCEDURE — 81003 URINALYSIS AUTO W/O SCOPE: CPT | Performed by: NURSE PRACTITIONER

## 2023-08-17 PROCEDURE — 99214 OFFICE O/P EST MOD 30 MIN: CPT | Performed by: NURSE PRACTITIONER

## 2023-08-17 PROCEDURE — 83036 HEMOGLOBIN GLYCOSYLATED A1C: CPT | Performed by: NURSE PRACTITIONER

## 2023-08-17 PROCEDURE — 82044 UR ALBUMIN SEMIQUANTITATIVE: CPT | Performed by: NURSE PRACTITIONER

## 2023-08-17 RX ORDER — CEFDINIR 300 MG/1
300 CAPSULE ORAL 2 TIMES DAILY
Qty: 14 CAPSULE | Refills: 0 | Status: SHIPPED | OUTPATIENT
Start: 2023-08-17 | End: 2023-08-24

## 2023-08-21 PROBLEM — R30.0 DYSURIA: Status: ACTIVE | Noted: 2023-08-21

## 2023-08-21 PROBLEM — E11.9 CONTROLLED TYPE 2 DIABETES MELLITUS WITHOUT COMPLICATION, WITHOUT LONG-TERM CURRENT USE OF INSULIN: Chronic | Status: ACTIVE | Noted: 2022-03-08

## 2023-08-22 NOTE — ASSESSMENT & PLAN NOTE
Due to patient being symptomatic, plan to cover with Cefdinir and culture urine.   Plan to refer to urology.

## 2023-08-22 NOTE — ASSESSMENT & PLAN NOTE
Diabetes is improving with lifestyle modifications.   Dietary recommendations for ADA diet.  Regular aerobic exercise.  Diabetes will be reassessed in 6 months.

## 2023-08-22 NOTE — PROGRESS NOTES
Follow Up Office Note     Patient Name: Ming Swenson  : 1970   MRN: [id number]     Chief Complaint:    Chief Complaint   Patient presents with    Follow-up     Per patient he went to [facility] this past Saturday and was dx'd with a really bad UTI. Patient states he doesn't feel like it is getting better and he is having painful urination and some back pain. He was given bactrim ds and an abx shot.     Urinary Tract Infection    Diabetes       History of Present Illness: Ming Swenson is a 52 y.o. male who presents today with c/o dysuria. Patient recently seen at [facility] for same complaint. He was treated with Cefdinir injection and course of Bactrim, but does not feel that his infection has resolved yet.     Patient has had issues with chronic prostatitis for the past year. He was previously being followed by Dr. Champion at [facility] for management, but he has not been seen since 2022.     UC with Salvador Yeung III, DO (2023)     Patient has chronic DM2 which has been stable and controlled.    Subjective      I have reviewed and the following portions of the patient's history were updated as appropriate: past family history, past medical history, past social history, past surgical history and problem list.    Review of Systems:   Review of Systems   Constitutional:  Negative for appetite change, chills, diaphoresis and fever.   Respiratory: Negative.     Cardiovascular: Negative.    Gastrointestinal:  Negative for nausea and vomiting.   Genitourinary:  Positive for dysuria. Negative for flank pain and hematuria.      Past Medical History:   Past Medical History:   Diagnosis Date    Acute conjunctivitis     Anesthesia complication     PT REPORTS HX OF WAKING UP FIGHTING AFTER SURGERY D/T PTSD    Diabetes mellitus     HX OF TYPE 2 PER PT, NO MEDS    Disc degeneration, lumbar     Hypertension     Peroneal muscular atrophy     PTSD (post-traumatic stress  "disorder)     Skin candidiasis     Ventral hernia          Medications:     Current Outpatient Medications:     benazepril (LOTENSIN) 10 MG tablet, TAKE 1 TABLET BY MOUTH EVERY DAY, Disp: 90 tablet, Rfl: 0    ibuprofen (ADVIL,MOTRIN) 800 MG tablet, Take 1 tablet by mouth 3 (Three) Times a Day With Meals for 10 days., Disp: 30 tablet, Rfl: 0    cefdinir (OMNICEF) 300 MG capsule, Take 1 capsule by mouth 2 (Two) Times a Day for 7 days., Disp: 14 capsule, Rfl: 0    Allergies:   Allergies   Allergen Reactions    Rofecoxib Rash     (Vioxx)         Objective     Physical Exam:  Vital Signs:   Vitals:    08/17/23 1522   BP: 144/84   Pulse: 66   Temp: 98.4 øF (36.9 øC)   TempSrc: Infrared   SpO2: 95%   Weight: (!) 157 kg (346 lb 9.6 oz)   Height: 185.4 cm (72.99\")     Body mass index is 45.74 kg/mý.     Physical Exam  Vitals and nursing note reviewed.   Constitutional:       General: He is not in acute distress.     Appearance: Normal appearance. He is well-developed. He is not ill-appearing, toxic-appearing or diaphoretic.   HENT:      Head: Normocephalic and atraumatic.   Cardiovascular:      Rate and Rhythm: Normal rate and regular rhythm.   Pulmonary:      Effort: Pulmonary effort is normal. No respiratory distress.      Breath sounds: Normal breath sounds. No stridor. No wheezing.   Abdominal:      Tenderness: There is no abdominal tenderness. There is no right CVA tenderness or left CVA tenderness.   Skin:     General: Skin is warm and dry.   Neurological:      General: No focal deficit present.      Mental Status: He is alert and oriented to person, place, and time.   Psychiatric:         Mood and Affect: Mood normal.         Behavior: Behavior normal. Behavior is cooperative.         Thought Content: Thought content normal.         Judgment: Judgment normal.       Assessment / Plan      Assessment/Plan:   Diagnoses and all orders for this visit:    1. Dysuria (Primary)  Assessment & Plan:  Due to patient being " symptomatic, plan to cover with Cefdinir and culture urine.   Plan to refer to urology.    Orders:  -     POC Urinalysis Dipstick, Automated  -     Ambulatory Referral to Urology  -     cefdinir (OMNICEF) 300 MG capsule; Take 1 capsule by mouth 2 (Two) Times a Day for 7 days.  Dispense: 14 capsule; Refill: 0  -     Urinalysis With Culture If Indicated -; Future    2. Controlled type 2 diabetes mellitus without complication, without long-term current use of insulin  Assessment & Plan:  Diabetes is improving with lifestyle modifications.   Dietary recommendations for ADA diet.  Regular aerobic exercise.  Diabetes will be reassessed in 6 months.    Orders:  -     POC Glycosylated Hemoglobin (Hb A1C)  -     POC Microalbumin        Lab 08/17/23  1537   HEMOGLOBIN A1C 5.5      3. Chronic prostatitis  -     Ambulatory Referral to Urology  -     OneSwab - Kit, Urine, Clean Catch; Future  -     Urinalysis With Culture If Indicated -; Future    4. Lower urinary tract symptoms due to benign prostatic hyperplasia  -     Ambulatory Referral to Urology       Brief Urine Lab Results  (Last result in the past 365 days)        Color   Clarity   Blood   Leuk Est   Nitrite   Protein   CREAT   Urine HCG        08/17/23 1542 Yellow   Clear   Negative   Negative   Negative   Negative                    Follow Up:   PRN and at next scheduled appointment(s) with PCP.    Discussed the nature of the medical condition(s) risks, complications, implications, management, safe and proper use of medications. Encouraged medication compliance, and keeping scheduled follow up appointments with me and any other providers.      RTC if symptoms fail to improve, to ER if symptoms worsen.        *Dictated Utilizing Dragon Dictation   Please note that portions of this note were completed with a voice recognition program.   Part of this note may be an electronic transcription/translation of spoken language to printed text using the Dragon Dictation System.  Spelling and/or grammatical errors may exist despite efforts at proofreading.      NOTE TO PATIENT: The 21st Century Cures Act makes medical notes like these available to patients in the interest of transparency. However, be advised this is a medical document. It is intended as peer to peer communication. It is written in medical language and may contain abbreviations or verbiage that are unfamiliar. It may appear blunt or direct. Medical documents are intended to carry relevant information, facts as evident, and the clinical opinion of the practitioner.      RAMONA Myers  [facility] [place]

## 2023-08-25 DIAGNOSIS — I10 ESSENTIAL HYPERTENSION: ICD-10-CM

## 2023-08-25 RX ORDER — BENAZEPRIL HYDROCHLORIDE 10 MG/1
10 TABLET ORAL DAILY
Qty: 90 TABLET | Refills: 0 | Status: SHIPPED | OUTPATIENT
Start: 2023-08-25

## 2023-08-25 NOTE — TELEPHONE ENCOUNTER
Rx Refill Note  Requested Prescriptions     Pending Prescriptions Disp Refills    benazepril (LOTENSIN) 10 MG tablet 90 tablet 0     Sig: Take 1 tablet by mouth Daily.      Last office visit with prescribing clinician: Visit date not found   Last telemedicine visit with prescribing clinician: Visit date not found   Next office visit with prescribing clinician: Visit date not found                         Would you like a call back once the refill request has been completed: [] Yes [] No    If the office needs to give you a call back, can they leave a voicemail: [] Yes [] No    Susy Rosales LPN  08/25/23, 10:56 EDT

## 2023-08-31 RX ORDER — SILDENAFIL 100 MG/1
100 TABLET, FILM COATED ORAL DAILY PRN
Qty: 30 TABLET | Refills: 0 | Status: SHIPPED | OUTPATIENT
Start: 2023-08-31

## 2023-08-31 NOTE — TELEPHONE ENCOUNTER
Caller: LeelaMing Stanislav    Relationship: Self    Best call back number: [phone]     Requested Prescriptions:   Requested Prescriptions     Pending Prescriptions Disp Refills    sildenafil (VIAGRA) 100 MG tablet 30 tablet 0     Sig: Take 1 tablet by mouth Daily As Needed for Erectile Dysfunction.        Pharmacy where request should be sent: [facility] [id number] [address] AT [address] [phone] [facility] [phone]      Last office visit with prescribing clinician: 4/5/2022   Last telemedicine visit with prescribing clinician: Visit date not found   Next office visit with prescribing clinician: Visit date not found     Additional details provided by patient: PATIENT IS COMPLETELY OUT OF THIS MEDICATION AND STATED THAT HE FORGOT TO MENTION NEEDING A REFILL ON THIS AT HIS LAST APPOINTMENT ON 8/17/23     Does the patient have less than a 3 day supply:  [x] Yes  [] No    Would you like a call back once the refill request has been completed: [x] Yes [] No    If the office needs to give you a call back, can they leave a voicemail: [x] Yes [] No    Archana Do Rep   08/31/23 10:01 EDT

## 2023-11-24 DIAGNOSIS — I10 ESSENTIAL HYPERTENSION: ICD-10-CM

## 2023-11-25 RX ORDER — BENAZEPRIL HYDROCHLORIDE 10 MG/1
10 TABLET ORAL DAILY
Qty: 90 TABLET | Refills: 0 | Status: SHIPPED | OUTPATIENT
Start: 2023-11-25

## 2023-11-27 RX ORDER — SILDENAFIL 100 MG/1
100 TABLET, FILM COATED ORAL DAILY PRN
Qty: 30 TABLET | Refills: 0 | Status: SHIPPED | OUTPATIENT
Start: 2023-11-27

## 2024-02-19 ENCOUNTER — LAB (OUTPATIENT)
Dept: LAB | Facility: HOSPITAL | Age: 54
End: 2024-02-19
Payer: COMMERCIAL

## 2024-02-19 DIAGNOSIS — R30.0 DYSURIA: ICD-10-CM

## 2024-02-19 DIAGNOSIS — N41.1 CHRONIC PROSTATITIS: ICD-10-CM

## 2024-02-19 LAB
BILIRUB UR QL STRIP: NEGATIVE
CLARITY UR: CLEAR
COLOR UR: YELLOW
GLUCOSE UR STRIP-MCNC: NEGATIVE MG/DL
HGB UR QL STRIP.AUTO: NEGATIVE
KETONES UR QL STRIP: NEGATIVE
LEUKOCYTE ESTERASE UR QL STRIP.AUTO: NEGATIVE
NITRITE UR QL STRIP: NEGATIVE
PH UR STRIP.AUTO: 7 [PH] (ref 5–8)
PROT UR QL STRIP: NEGATIVE
SP GR UR STRIP: 1.02 (ref 1–1.03)
UROBILINOGEN UR QL STRIP: NORMAL

## 2024-02-19 PROCEDURE — 81003 URINALYSIS AUTO W/O SCOPE: CPT

## 2024-02-20 LAB — HOLD SPECIMEN: NORMAL

## 2024-02-24 DIAGNOSIS — I10 ESSENTIAL HYPERTENSION: ICD-10-CM

## 2024-02-26 RX ORDER — SILDENAFIL 100 MG/1
100 TABLET, FILM COATED ORAL DAILY PRN
Qty: 30 TABLET | Refills: 0 | Status: SHIPPED | OUTPATIENT
Start: 2024-02-26

## 2024-02-26 RX ORDER — BENAZEPRIL HYDROCHLORIDE 10 MG/1
10 TABLET ORAL DAILY
Qty: 90 TABLET | Refills: 0 | Status: SHIPPED | OUTPATIENT
Start: 2024-02-26

## 2024-05-25 DIAGNOSIS — I10 ESSENTIAL HYPERTENSION: ICD-10-CM

## 2024-05-27 RX ORDER — BENAZEPRIL HYDROCHLORIDE 10 MG/1
10 TABLET ORAL DAILY
Qty: 90 TABLET | Refills: 0 | Status: SHIPPED | OUTPATIENT
Start: 2024-05-27

## 2024-08-05 RX ORDER — SILDENAFIL 100 MG/1
100 TABLET, FILM COATED ORAL DAILY PRN
Qty: 30 TABLET | Refills: 0 | OUTPATIENT
Start: 2024-08-05

## 2024-08-14 ENCOUNTER — OFFICE VISIT (OUTPATIENT)
Dept: FAMILY MEDICINE CLINIC | Facility: CLINIC | Age: 54
End: 2024-08-14
Payer: COMMERCIAL

## 2024-08-14 ENCOUNTER — LAB (OUTPATIENT)
Dept: LAB | Facility: HOSPITAL | Age: 54
End: 2024-08-14
Payer: COMMERCIAL

## 2024-08-14 VITALS
HEIGHT: 73 IN | DIASTOLIC BLOOD PRESSURE: 100 MMHG | SYSTOLIC BLOOD PRESSURE: 154 MMHG | OXYGEN SATURATION: 98 % | TEMPERATURE: 97 F | BODY MASS INDEX: 41.75 KG/M2 | WEIGHT: 315 LBS | HEART RATE: 67 BPM

## 2024-08-14 DIAGNOSIS — Z00.00 ANNUAL PHYSICAL EXAM: ICD-10-CM

## 2024-08-14 DIAGNOSIS — I10 ESSENTIAL HYPERTENSION: ICD-10-CM

## 2024-08-14 DIAGNOSIS — Z00.00 ANNUAL PHYSICAL EXAM: Primary | ICD-10-CM

## 2024-08-14 DIAGNOSIS — Z12.5 PROSTATE CANCER SCREENING: ICD-10-CM

## 2024-08-14 DIAGNOSIS — E11.9 TYPE 2 DIABETES MELLITUS WITHOUT COMPLICATION, WITHOUT LONG-TERM CURRENT USE OF INSULIN: ICD-10-CM

## 2024-08-14 DIAGNOSIS — E66.01 MORBID (SEVERE) OBESITY DUE TO EXCESS CALORIES: ICD-10-CM

## 2024-08-14 DIAGNOSIS — Z12.11 SCREEN FOR COLON CANCER: ICD-10-CM

## 2024-08-14 LAB
ALBUMIN SERPL-MCNC: 4.5 G/DL (ref 3.5–5.2)
ALBUMIN UR-MCNC: <1.2 MG/DL
ALBUMIN/GLOB SERPL: 1.7 G/DL
ALP SERPL-CCNC: 52 U/L (ref 39–117)
ALT SERPL W P-5'-P-CCNC: 32 U/L (ref 1–41)
ANION GAP SERPL CALCULATED.3IONS-SCNC: 11 MMOL/L (ref 5–15)
AST SERPL-CCNC: 25 U/L (ref 1–40)
BILIRUB SERPL-MCNC: 0.3 MG/DL (ref 0–1.2)
BUN SERPL-MCNC: 11 MG/DL (ref 6–20)
BUN/CREAT SERPL: 15.9 (ref 7–25)
CALCIUM SPEC-SCNC: 9.2 MG/DL (ref 8.6–10.5)
CHLORIDE SERPL-SCNC: 102 MMOL/L (ref 98–107)
CHOLEST SERPL-MCNC: 200 MG/DL (ref 0–200)
CO2 SERPL-SCNC: 22 MMOL/L (ref 22–29)
CREAT SERPL-MCNC: 0.69 MG/DL (ref 0.76–1.27)
DEPRECATED RDW RBC AUTO: 42.5 FL (ref 37–54)
EGFRCR SERPLBLD CKD-EPI 2021: 112 ML/MIN/1.73
ERYTHROCYTE [DISTWIDTH] IN BLOOD BY AUTOMATED COUNT: 13.9 % (ref 12.3–15.4)
GLOBULIN UR ELPH-MCNC: 2.7 GM/DL
GLUCOSE SERPL-MCNC: 89 MG/DL (ref 65–99)
HBA1C MFR BLD: 5.7 % (ref 4.8–5.6)
HCT VFR BLD AUTO: 46.3 % (ref 37.5–51)
HDLC SERPL-MCNC: 43 MG/DL (ref 40–60)
HGB BLD-MCNC: 16 G/DL (ref 13–17.7)
LDLC SERPL CALC-MCNC: 130 MG/DL (ref 0–100)
LDLC/HDLC SERPL: 2.95 {RATIO}
MCH RBC QN AUTO: 29.6 PG (ref 26.6–33)
MCHC RBC AUTO-ENTMCNC: 34.6 G/DL (ref 31.5–35.7)
MCV RBC AUTO: 85.6 FL (ref 79–97)
PLATELET # BLD AUTO: 212 10*3/MM3 (ref 140–450)
PMV BLD AUTO: 12.6 FL (ref 6–12)
POTASSIUM SERPL-SCNC: 4.1 MMOL/L (ref 3.5–5.2)
PROT SERPL-MCNC: 7.2 G/DL (ref 6–8.5)
PSA SERPL-MCNC: 0.29 NG/ML (ref 0–4)
RBC # BLD AUTO: 5.41 10*6/MM3 (ref 4.14–5.8)
SODIUM SERPL-SCNC: 135 MMOL/L (ref 136–145)
T4 FREE SERPL-MCNC: 1.37 NG/DL (ref 0.92–1.68)
TRIGL SERPL-MCNC: 150 MG/DL (ref 0–150)
TSH SERPL DL<=0.05 MIU/L-ACNC: 1.07 UIU/ML (ref 0.27–4.2)
VIT B12 BLD-MCNC: 577 PG/ML (ref 211–946)
VLDLC SERPL-MCNC: 27 MG/DL (ref 5–40)
WBC NRBC COR # BLD AUTO: 5.84 10*3/MM3 (ref 3.4–10.8)

## 2024-08-14 PROCEDURE — 84439 ASSAY OF FREE THYROXINE: CPT

## 2024-08-14 PROCEDURE — 36415 COLL VENOUS BLD VENIPUNCTURE: CPT

## 2024-08-14 PROCEDURE — 82607 VITAMIN B-12: CPT

## 2024-08-14 PROCEDURE — 85027 COMPLETE CBC AUTOMATED: CPT

## 2024-08-14 PROCEDURE — G0103 PSA SCREENING: HCPCS

## 2024-08-14 PROCEDURE — 80061 LIPID PANEL: CPT

## 2024-08-14 PROCEDURE — 82043 UR ALBUMIN QUANTITATIVE: CPT

## 2024-08-14 PROCEDURE — 83036 HEMOGLOBIN GLYCOSYLATED A1C: CPT

## 2024-08-14 PROCEDURE — 80053 COMPREHEN METABOLIC PANEL: CPT

## 2024-08-14 PROCEDURE — 99396 PREV VISIT EST AGE 40-64: CPT | Performed by: PHYSICIAN ASSISTANT

## 2024-08-14 PROCEDURE — 84443 ASSAY THYROID STIM HORMONE: CPT

## 2024-08-14 RX ORDER — BENAZEPRIL HYDROCHLORIDE 10 MG/1
10 TABLET ORAL DAILY
Qty: 90 TABLET | Refills: 3 | Status: SHIPPED | OUTPATIENT
Start: 2024-08-14

## 2024-08-14 RX ORDER — SILDENAFIL 100 MG/1
100 TABLET, FILM COATED ORAL DAILY PRN
Qty: 30 TABLET | Refills: 0 | Status: SHIPPED | OUTPATIENT
Start: 2024-08-14

## 2024-08-14 NOTE — PROGRESS NOTES
Male Physical Note      Date: 2024   Patient Name: Ming Swenson  : 1970   MRN: [id number]     Chief Complaint:    Chief Complaint   Patient presents with    Annual Exam       History of Present Illness: Ming Swenson is a 53 y.o. male who is here today for their annual health maintenance and physical.  History of Present Illness  The patient presents for evaluation of multiple medical concerns.    He has been adhering to his blood pressure medication regimen, although he did not take it today and is seeking a refill. His blood pressure was slightly elevated this morning, which he attributes to white coat syndrome. He monitors his blood pressure at home, which typically reads around 120/70. He has a family history of hypertension in both parents, but no family history of stroke or heart attack.    He has a history of Post-Traumatic Stress Disorder (PTSD) and identifies doctor's visits as a trigger. He does not currently take any medication for PTSD but previously took Seroquel 1200 mg, which helped him sleep and improved his condition. He experiences anxiety in situations where he feels he lacks control, such as during dental visits or surgeries.    He has gained some weight recently, which he believes is due to increased happiness. He maintains a healthy diet, consuming between 75 to 100 carbs per day, and occasionally as few as 10 to 12. He typically eats twice a day and fasts for 18 hours. Despite his dietary efforts, he is not losing weight. He has reduced his caffeine intake due to its impact on his sex hormone binding globulin levels. His highest recorded weight was 476 pounds.    He has not had an eye exam recently, but his vision was previously recorded as 20/10. He also checks his blood sugar regularly, which averages around 116.    He had a hernia surgery in  and stopped using a wheelchair in 2022 after deciding to get healthier and get feeling better.    FAMILY  "HISTORY  His daughter has Hashimoto's.      Subjective      Review of Systems:   Review of Systems    Past Medical History, Social History, Family History and Care Team were all reviewed with patient and updated as appropriate.     Medications:     Current Outpatient Medications:     benazepril (LOTENSIN) 10 MG tablet, TAKE 1 TABLET BY MOUTH EVERY DAY, Disp: 90 tablet, Rfl: 0    sildenafil (VIAGRA) 100 MG tablet, Take 1 tablet by mouth Daily As Needed for Erectile Dysfunction., Disp: 30 tablet, Rfl: 0    Allergies:   Allergies   Allergen Reactions    Rofecoxib Rash     (Vioxx)       PHQ-9 Depression Screening  Little interest or pleasure in doing things? 0-->not at all   Feeling down, depressed, or hopeless? 0-->not at all   Trouble falling or staying asleep, or sleeping too much?     Feeling tired or having little energy?     Poor appetite or overeating?     Feeling bad about yourself - or that you are a failure or have let yourself or your family down?     Trouble concentrating on things, such as reading the newspaper or watching television?     Moving or speaking so slowly that other people could have noticed? Or the opposite - being so fidgety or restless that you have been moving around a lot more than usual?     Thoughts that you would be better off dead, or of hurting yourself in some way?     PHQ-9 Total Score 0   If you checked off any problems, how difficult have these problems made it for you to do your work, take care of things at home, or get along with other people?           Objective     Vital Signs:   Vitals:    08/14/24 1008   BP: 154/100   Pulse: 67   Temp: 97 °F (36.1 °C)   SpO2: 98%   Weight: (!) 167 kg (368 lb 1.6 oz)   Height: 185.4 cm (72.99\")     Body mass index is 48.58 kg/m².          Physical Exam:   Physical Exam  Vitals and nursing note reviewed.   Constitutional:       General: He is not in acute distress.     Appearance: Normal appearance. He is well-developed.   HENT:      Head: " Normocephalic and atraumatic.      Right Ear: Tympanic membrane and ear canal normal. There is no impacted cerumen.      Left Ear: Tympanic membrane and ear canal normal. There is no impacted cerumen.      Nose: Nose normal. No congestion or rhinorrhea.      Mouth/Throat:      Mouth: Mucous membranes are moist.      Pharynx: Oropharynx is clear. No oropharyngeal exudate or posterior oropharyngeal erythema.   Eyes:      General: No scleral icterus.        Right eye: No discharge.         Left eye: No discharge.      Extraocular Movements: Extraocular movements intact.      Conjunctiva/sclera: Conjunctivae normal.      Pupils: Pupils are equal, round, and reactive to light.   Neck:      Thyroid: No thyromegaly.      Vascular: No carotid bruit.   Cardiovascular:      Rate and Rhythm: Normal rate and regular rhythm.      Heart sounds: Normal heart sounds. No murmur heard.  Pulmonary:      Breath sounds: Normal breath sounds. No wheezing, rhonchi or rales.   Abdominal:      General: Bowel sounds are normal. There is no distension.      Palpations: Abdomen is soft. There is no mass.      Tenderness: There is no abdominal tenderness.   Musculoskeletal:         General: No swelling. Normal range of motion.      Cervical back: Normal range of motion and neck supple.      Right lower leg: No edema.      Left lower leg: No edema.   Lymphadenopathy:      Cervical: No cervical adenopathy.   Skin:     General: Skin is warm.      Coloration: Skin is not jaundiced or pale.      Findings: No bruising or rash.   Neurological:      General: No focal deficit present.      Mental Status: He is alert.      Cranial Nerves: No cranial nerve deficit.      Motor: No weakness.      Gait: Gait normal.   Psychiatric:         Mood and Affect: Mood normal.         Behavior: Behavior normal.         Judgment: Judgment normal.          Procedures    Assessment / Plan      Assessment/Plan:   Diagnoses and all orders for this visit:    1. Annual  physical exam (Primary)    2. Type 2 diabetes mellitus without complication, without long-term current use of insulin  -     Hemoglobin A1c; Future    3. Morbid (severe) obesity due to excess calories    4. Primary hypertension       Assessment & Plan  1. Hypertension.  He reports occasional missed doses of his blood pressure medication, which he can feel physically. He is advised to continue his current blood pressure medication regimen and ensure he takes it consistently. If he misses a dose, he should take it as soon as he remembers unless it is almost time for his next dose. Blood pressure will be monitored regularly.    2. Post-Traumatic Stress Disorder (PTSD).  He has a history of PTSD triggered by visits to the doctor's office and dental appointments. He does not currently take medication for PTSD and feels he has managed it well over the years. No new treatment is recommended at this time.    3. Diabetes Mellitus.  He is concerned about his weight gain and requests an A1c test. He maintains a low-carb diet and monitors his blood sugar levels regularly. Fasting labs, including A1c, will be conducted today. He is advised to continue his current dietary regimen and exercise routine. If the A1c levels are elevated, adjustments to his treatment plan will be considered.    4. Weight Gain.  He has gained approximately 20 pounds and is concerned about his inability to lose weight despite a low-carb diet. Thyroid function tests will be conducted to rule out any thyroid issues. He is advised to continue his current diet and exercise routine.    5. Medication Management.  He requests a refill for his blood pressure medication. The prescription will be sent to his pharmacy.    6. Health Maintenance.  He is due for a colon cancer screening and plans to schedule it soon. His wife is having her screening tomorrow, and he will arrange his after that. An eye exam is also recommended, although he reports having good  vision.    Follow-up  The patient will follow up in 6 months.      Follow Up:   No follow-ups on file.    Healthcare Maintenance:   Counseling provided on healthy diet, health maintenance recommendations.  Ming Ramosh Leela voices understanding and acceptance of this advice and will call back with any further questions or concerns. AVS with preventive healthcare tips printed for patient.     Patient or patient representative verbalized consent for the use of Ambient Listening during the visit with  Bernadine Escobar PA-C for chart documentation. 8/14/2024  14:22 EDT    Bernadine Escobar PA-C  [facility] [place]

## 2025-03-17 RX ORDER — SILDENAFIL 100 MG/1
100 TABLET, FILM COATED ORAL DAILY PRN
Qty: 30 TABLET | Refills: 0 | Status: SHIPPED | OUTPATIENT
Start: 2025-03-17

## 2025-04-28 ENCOUNTER — LAB (OUTPATIENT)
Dept: LAB | Facility: HOSPITAL | Age: 55
End: 2025-04-28
Payer: COMMERCIAL

## 2025-04-28 ENCOUNTER — TELEPHONE (OUTPATIENT)
Dept: FAMILY MEDICINE CLINIC | Facility: CLINIC | Age: 55
End: 2025-04-28
Payer: COMMERCIAL

## 2025-04-28 ENCOUNTER — OFFICE VISIT (OUTPATIENT)
Dept: FAMILY MEDICINE CLINIC | Facility: CLINIC | Age: 55
End: 2025-04-28
Payer: COMMERCIAL

## 2025-04-28 VITALS
HEART RATE: 80 BPM | HEIGHT: 73 IN | SYSTOLIC BLOOD PRESSURE: 152 MMHG | WEIGHT: 315 LBS | OXYGEN SATURATION: 98 % | DIASTOLIC BLOOD PRESSURE: 102 MMHG | TEMPERATURE: 96.4 F | BODY MASS INDEX: 41.75 KG/M2

## 2025-04-28 DIAGNOSIS — I10 ESSENTIAL HYPERTENSION: ICD-10-CM

## 2025-04-28 DIAGNOSIS — E11.9 TYPE 2 DIABETES MELLITUS WITHOUT COMPLICATION, WITHOUT LONG-TERM CURRENT USE OF INSULIN: ICD-10-CM

## 2025-04-28 DIAGNOSIS — E11.9 TYPE 2 DIABETES MELLITUS WITHOUT COMPLICATION, WITHOUT LONG-TERM CURRENT USE OF INSULIN: Primary | ICD-10-CM

## 2025-04-28 LAB
ALBUMIN SERPL-MCNC: 4.3 G/DL (ref 3.5–5.2)
ALBUMIN UR-MCNC: <1.2 MG/DL
ALBUMIN/GLOB SERPL: 1.4 G/DL
ALP SERPL-CCNC: 60 U/L (ref 39–117)
ALT SERPL W P-5'-P-CCNC: 28 U/L (ref 1–41)
ANION GAP SERPL CALCULATED.3IONS-SCNC: 14 MMOL/L (ref 5–15)
AST SERPL-CCNC: 23 U/L (ref 1–40)
BILIRUB SERPL-MCNC: 0.4 MG/DL (ref 0–1.2)
BUN SERPL-MCNC: 11 MG/DL (ref 6–20)
BUN/CREAT SERPL: 16.7 (ref 7–25)
CALCIUM SPEC-SCNC: 9.6 MG/DL (ref 8.6–10.5)
CHLORIDE SERPL-SCNC: 102 MMOL/L (ref 98–107)
CHOLEST SERPL-MCNC: 198 MG/DL (ref 0–200)
CO2 SERPL-SCNC: 23 MMOL/L (ref 22–29)
CREAT SERPL-MCNC: 0.66 MG/DL (ref 0.76–1.27)
CREAT UR-MCNC: 14.9 MG/DL
DEPRECATED RDW RBC AUTO: 42.4 FL (ref 37–54)
EGFRCR SERPLBLD CKD-EPI 2021: 112.9 ML/MIN/1.73
ERYTHROCYTE [DISTWIDTH] IN BLOOD BY AUTOMATED COUNT: 13.8 % (ref 12.3–15.4)
GLOBULIN UR ELPH-MCNC: 3.1 GM/DL
GLUCOSE SERPL-MCNC: 110 MG/DL (ref 65–99)
HBA1C MFR BLD: 5.8 % (ref 4.8–5.6)
HCT VFR BLD AUTO: 47.1 % (ref 37.5–51)
HDLC SERPL-MCNC: 50 MG/DL (ref 40–60)
HGB BLD-MCNC: 15.7 G/DL (ref 13–17.7)
LDLC SERPL CALC-MCNC: 118 MG/DL (ref 0–100)
LDLC/HDLC SERPL: 2.27 {RATIO}
MCH RBC QN AUTO: 28.6 PG (ref 26.6–33)
MCHC RBC AUTO-ENTMCNC: 33.3 G/DL (ref 31.5–35.7)
MCV RBC AUTO: 85.8 FL (ref 79–97)
MICROALBUMIN/CREAT UR: NORMAL MG/G{CREAT}
PLATELET # BLD AUTO: 237 10*3/MM3 (ref 140–450)
PMV BLD AUTO: 12.4 FL (ref 6–12)
POTASSIUM SERPL-SCNC: 4.4 MMOL/L (ref 3.5–5.2)
PROT SERPL-MCNC: 7.4 G/DL (ref 6–8.5)
RBC # BLD AUTO: 5.49 10*6/MM3 (ref 4.14–5.8)
SODIUM SERPL-SCNC: 139 MMOL/L (ref 136–145)
T4 FREE SERPL-MCNC: 1.21 NG/DL (ref 0.92–1.68)
TRIGL SERPL-MCNC: 172 MG/DL (ref 0–150)
TSH SERPL DL<=0.05 MIU/L-ACNC: 0.99 UIU/ML (ref 0.27–4.2)
VIT B12 BLD-MCNC: 439 PG/ML (ref 211–946)
VLDLC SERPL-MCNC: 30 MG/DL (ref 5–40)
WBC NRBC COR # BLD AUTO: 6.41 10*3/MM3 (ref 3.4–10.8)

## 2025-04-28 PROCEDURE — 36415 COLL VENOUS BLD VENIPUNCTURE: CPT

## 2025-04-28 PROCEDURE — 84439 ASSAY OF FREE THYROXINE: CPT

## 2025-04-28 PROCEDURE — 85027 COMPLETE CBC AUTOMATED: CPT

## 2025-04-28 PROCEDURE — 82607 VITAMIN B-12: CPT

## 2025-04-28 PROCEDURE — 80061 LIPID PANEL: CPT

## 2025-04-28 PROCEDURE — 82570 ASSAY OF URINE CREATININE: CPT

## 2025-04-28 PROCEDURE — 83036 HEMOGLOBIN GLYCOSYLATED A1C: CPT

## 2025-04-28 PROCEDURE — 82043 UR ALBUMIN QUANTITATIVE: CPT

## 2025-04-28 PROCEDURE — 84443 ASSAY THYROID STIM HORMONE: CPT

## 2025-04-28 PROCEDURE — 80053 COMPREHEN METABOLIC PANEL: CPT

## 2025-04-28 RX ORDER — BENAZEPRIL HYDROCHLORIDE 20 MG/1
20 TABLET ORAL DAILY
Qty: 90 TABLET | Refills: 1 | Status: SHIPPED | OUTPATIENT
Start: 2025-04-28

## 2025-04-28 NOTE — PROGRESS NOTES
Follow Up Office Visit    Date: 2025   Patient Name: Ming Swenson  : 1970   MRN: [id number]     Chief Complaint:    Chief Complaint   Patient presents with    Hypertension       History of Present Illness:   Ming Swenson is a 54 y.o. male.  History of Present Illness  The patient presents for evaluation of elevated blood pressure.    He has been experiencing elevated blood pressure, with readings consistently around 140/90. Recently, an increase to 150/100 has been observed. His wife believes he is monitoring his blood pressure excessively. A recent reading at an urgent care facility was 169/101, and they advised  increasing his medication dosage. His current medication dosage is relatively low. Despite attempts to manage his condition through hydration, diet, and exercise, he is now open to increasing his medication dosage. No headaches or nosebleeds are reported. There is a discrepancy between the readings from his home blood pressure monitor and those obtained in the clinic, with the former being approximately 20 points higher on both systolic and diastolic measures. An urgent care physician recommended acquiring an arm-based monitor instead of the current wrist-based one. He has been consuming a lot of purified water and is concerned about potential electrolyte imbalance, particularly low potassium levels. Anxiety and sleep disturbances due to concerns about his health are reported. No chest pain, respiratory difficulties, or edema are reported. He recalls experiencing ankle swelling in the past but reports no current issues. A note for work is requested as he left work today to go to urgent care and then came here.    He is due for a physical in August.        Subjective    Review of systems:  Review of Systems     I have reviewed and the following portions of the patient's history were updated as appropriate: past family history, past medical history, past social history, past  Discharge home with infant in stable condition "surgical history and problem list.    Medications:     Current Outpatient Medications:     benazepril (LOTENSIN) 20 MG tablet, Take 1 tablet by mouth Daily., Disp: 90 tablet, Rfl: 1    Allergies:   Allergies   Allergen Reactions    Rofecoxib Rash     (Vioxx)       Objective   Vital Signs:   Vitals:    04/28/25 0918   BP: (!) 152/102   Pulse: 80   Temp: 96.4 °F (35.8 °C)   TempSrc: Infrared   SpO2: 98%   Weight: (!) 172 kg (379 lb)   Height: 185.4 cm (73\")     Body mass index is 50 kg/m².          Physical Exam:   Physical Exam  Vitals and nursing note reviewed.   Constitutional:       Appearance: Normal appearance.   HENT:      Head: Normocephalic and atraumatic.      Nose: No congestion or rhinorrhea.      Mouth/Throat:      Mouth: Mucous membranes are moist.      Pharynx: Oropharynx is clear. No posterior oropharyngeal erythema.   Cardiovascular:      Rate and Rhythm: Normal rate and regular rhythm.   Pulmonary:      Effort: Pulmonary effort is normal.      Breath sounds: Normal breath sounds. No decreased breath sounds, wheezing, rhonchi or rales.   Musculoskeletal:      Cervical back: Neck supple.      Right lower leg: No edema.      Left lower leg: No edema.   Lymphadenopathy:      Cervical: No cervical adenopathy.   Neurological:      Mental Status: He is alert.   Psychiatric:         Mood and Affect: Mood normal.          Procedures     Assessment / Plan    Assessment/Plan:   Diagnoses and all orders for this visit:    1. Type 2 diabetes mellitus without complication, without long-term current use of insulin (Primary)  -     Hemoglobin A1c; Future  -     Comprehensive metabolic panel; Future  -     Microalbumin / Creatinine Urine Ratio - Urine, Clean Catch; Future    2. Essential hypertension  -     benazepril (LOTENSIN) 20 MG tablet; Take 1 tablet by mouth Daily.  Dispense: 90 tablet; Refill: 1  -     Lipid panel; Future  -     Comprehensive metabolic panel; Future  -     TSH; Future  -     T4, free; " Future  -     Vitamin B12; Future  -     CBC No Differential; Future       Assessment & Plan  1. Elevated blood pressure.  - Blood pressure readings have been consistently elevated, with recent measurements of 150/100 and 152/102.  - Reports no headaches, nosebleeds, chest pain, trouble breathing, or swelling.  - The dosage of current medication will be increased from 10 mg to 20 mg. Advised to continue taking 2 of his current 10 mg tablets until finished, then start the new 20 mg prescription. Prescription for 20 mg dosage will be sent to [facility] in [place].  - Instructed to monitor blood pressure at home using an upper arm cuff with a large adult or extra-large size to ensure accurate readings. Should email blood pressure readings via [employer] after 2 weeks for further evaluation. Blood and urine tests will be conducted today to check sugar levels and overall health status. A work note will be provided.    Follow-up  The patient will follow up in August for a physical.      Follow Up:   Return in about 4 months (around 8/13/2025) for Annual.    Patient or patient representative verbalized consent for the use of Ambient Listening during the visit with  Bernadine Escobar PA-C for chart documentation. 4/28/2025  09:44 EDT    Bernadine Escobar PA-C   [facility] [place]

## 2025-04-29 ENCOUNTER — PATIENT ROUNDING (BHMG ONLY) (OUTPATIENT)
Dept: URGENT CARE | Facility: CLINIC | Age: 55
End: 2025-04-29
Payer: COMMERCIAL

## 2025-08-19 DIAGNOSIS — I10 ESSENTIAL HYPERTENSION: ICD-10-CM

## 2025-08-19 RX ORDER — BENAZEPRIL HYDROCHLORIDE 10 MG/1
10 TABLET ORAL DAILY
Qty: 90 TABLET | Refills: 3 | OUTPATIENT
Start: 2025-08-19

## (undated) DEVICE — TRENDELENBURG WINGPAD POSITIONING KIT DELUXE - WITHOUT BODY STRAP: Brand: SOULE MEDICAL

## (undated) DEVICE — SUT VIC 0 UR6 27IN VCP603H

## (undated) DEVICE — ST TBG CONN PNEUMOCLEAR EVAC SMOKE HEAT/HUMID

## (undated) DEVICE — NEEDLE, QUINCKE 22GX3.5": Brand: MEDLINE INDUSTRIES, INC.

## (undated) DEVICE — BANDAGE COBAN 4 IN COMPR W4INXL5YD FOAM COHESIVE QUIK STK SELF ADH SFT

## (undated) DEVICE — TRAY PREP DRY W/ PREM GLV 2 APPL 6 SPNG 2 UNDPD 1 OVERWRAP

## (undated) DEVICE — GLOVE SURG SZ 85 L1185IN FNGR THK75MIL STRW LTX POLYMER

## (undated) DEVICE — JEWISH HOSPITAL TURNOVER KIT: Brand: MEDLINE INDUSTRIES, INC.

## (undated) DEVICE — BANDAGE,ELASTIC,ESMARK,STERILE,6"X9',LF: Brand: MEDLINE

## (undated) DEVICE — COUNTER NDL 40 COUNT HLD 70 NUM FOAM BLK SGL MAG W BLDE REMV

## (undated) DEVICE — STERILE POLYISOPRENE POWDER-FREE SURGICAL GLOVES WITH EMOLLIENT COATING: Brand: PROTEXIS

## (undated) DEVICE — PODIATRY PK

## (undated) DEVICE — PRECISION (9.0 X 0.51 X 25.0MM)

## (undated) DEVICE — BIT DRL DIA4.1MM TIB FOR ARTH NAIL SYS PANTA 2

## (undated) DEVICE — GUIDEWIRE VASC L600MM DIA3.2MM FOR IMPL NAIL PANTA 2

## (undated) DEVICE — PLATE ES AD W 9FT CRD 2

## (undated) DEVICE — GLOVE SURG SZ 65 THK91MIL LTX FREE SYN POLYISOPRENE

## (undated) DEVICE — 3M™ STERI-DRAPE™ U-DRAPE 1015: Brand: STERI-DRAPE™

## (undated) DEVICE — PATIENT RETURN ELECTRODE, SINGLE-USE, CONTACT QUALITY MONITORING, ADULT, WITH 9FT CORD, FOR PATIENTS WEIGING OVER 33LBS. (15KG): Brand: MEGADYNE

## (undated) DEVICE — Device

## (undated) DEVICE — SNAP KOVER: Brand: UNBRANDED

## (undated) DEVICE — ENDOPATH PNEUMONEEDLE INSUFFLATION NEEDLES WITH LUER LOCK CONNECTORS 120MM: Brand: ENDOPATH

## (undated) DEVICE — REDUCER: Brand: ENDOWRIST

## (undated) DEVICE — SPLINT ORTH W5XL30IN WHT FBRGLS 1 SIDE FELT PD CNFRM LO

## (undated) DEVICE — ROYAL SILK SURGICAL GOWN, XL: Brand: CONVERTORS

## (undated) DEVICE — INTENDED FOR TISSUE SEPARATION, AND OTHER PROCEDURES THAT REQUIRE A SHARP SURGICAL BLADE TO PUNCTURE OR CUT.: Brand: BARD-PARKER ® CARBON RIB-BACK BLADES

## (undated) DEVICE — 3M™ TEGADERM™ TRANSPARENT FILM DRESSING FRAME STYLE, 1626W, 4 IN X 4-3/4 IN (10 CM X 12 CM), 50/CT 4CT/CASE: Brand: 3M™ TEGADERM™

## (undated) DEVICE — SUTURE PDS II SZ 2-0 L27IN ABSRB VLT L26MM CT-2 1/2 CIR Z333H

## (undated) DEVICE — COAXIAL HIGH FLOW TIP WITH SOFT SHIELD

## (undated) DEVICE — APPLICATOR MEDICATED 26 CC SOLUTION HI LT ORNG CHLORAPREP

## (undated) DEVICE — ROLL COT W11.5INXL11FT 1LB HIGHLY ABSRB SURG GRD

## (undated) DEVICE — PADDING UNDERCAST W4INXL4YD 100% COT CRIMPED FINISH WBRL II

## (undated) DEVICE — DRESSING,GAUZE,XEROFORM,CURAD,1"X8",ST: Brand: CURAD

## (undated) DEVICE — PK LAP LASR CHOLE 10

## (undated) DEVICE — SPONGE GZ W4XL8IN COT WVN 12 PLY

## (undated) DEVICE — STRIP,CLOSURE,WOUND,MEDI-STRIP,1/2X4: Brand: MEDLINE

## (undated) DEVICE — PADDING CAST W4INXL4YD HIGHLY ABSRB THAN COT EZ APPL

## (undated) DEVICE — MARKER,SKIN,WI/RULER AND LABELS: Brand: MEDLINE

## (undated) DEVICE — 3M™ IOBAN™ 2 ANTIMICROBIAL INCISE DRAPE 6651EZ: Brand: IOBAN™ 2

## (undated) DEVICE — VELCLOSE LATEX FREE ELASTIC BANDAGE D/L 6INX10YD

## (undated) DEVICE — SOLUTION IV 1000ML 0.9% SOD CHL

## (undated) DEVICE — SUTURE VCRL SZ 3-0 L27IN ABSRB UD L26MM SH 1/2 CIR J416H

## (undated) DEVICE — COLUMN DRAPE

## (undated) DEVICE — SEAL

## (undated) DEVICE — ENDOPATH XCEL BLADELESS TROCARS WITH STABILITY SLEEVES: Brand: ENDOPATH XCEL

## (undated) DEVICE — BLADELESS OBTURATOR: Brand: WECK VISTA

## (undated) DEVICE — GLV SURG SIGNATURE TOUCH PF LTX 8.5 STRL

## (undated) DEVICE — ORTHO PRE OP PACK: Brand: MEDLINE INDUSTRIES, INC.

## (undated) DEVICE — SYRINGE MED 10ML LUERLOCK TIP W/O SFTY DISP

## (undated) DEVICE — E-Z CLEAN, NON-STICK, PTFE COATED, ELECTROSURGICAL BLADE ELECTRODE, 2.5 INCH (6.35 CM): Brand: EZ CLEAN

## (undated) DEVICE — BIT DRL OD2MM ANK CANN DISP FOR INTOSS FIX IOFIX +

## (undated) DEVICE — 3M™ IOBAN™ 2 ANTIMICROBIAL INCISE DRAPE 6640EZ: Brand: IOBAN™ 2

## (undated) DEVICE — Z CONVERTED USE 2276507 CONNECTOR TBNG POLYPR 5IN1 TOUGH SHATTERPROOF CLN FOR

## (undated) DEVICE — GOWN,SIRUS,POLYRNF,BRTHSLV,LG,30/CS: Brand: MEDLINE

## (undated) DEVICE — ROYAL SILK SURGICAL GOWN, XXXL, LONG: Brand: CONVERTORS

## (undated) DEVICE — GLV SURG SENSICARE SLT PF LF 8 STRL

## (undated) DEVICE — SUT MNCRYL PLS ANTIB UD 4/0 PS2 18IN

## (undated) DEVICE — K WIRE FIX L6IN DIA0.062IN 1600662] MICROAIRE SURGICAL INSTRUMENTS INC]

## (undated) DEVICE — LONG CALCNL DRL 4.1MM

## (undated) DEVICE — HANDPIECE SUCTION TUBING INTERPULSE 10FT

## (undated) DEVICE — STERILE VELCLOSE ELASTIC BANDAGE, 6IN: Brand: VELCLOSE

## (undated) DEVICE — SURGICAL SET UP - SURE SET: Brand: MEDLINE INDUSTRIES, INC.

## (undated) DEVICE — GAUZE,SPONGE,4"X4",16PLY,XRAY,STRL,LF: Brand: MEDLINE

## (undated) DEVICE — GOWN,PREVENTION PLUS,XXLARGE,STERILE: Brand: MEDLINE

## (undated) DEVICE — LAPAROVUE VISIBILITY SYSTEM LAPAROSCOPIC SOLUTIONS: Brand: LAPAROVUE

## (undated) DEVICE — ARM DRAPE

## (undated) DEVICE — BNDR ABD PREMIUM/UNIV 10IN 27TO48IN

## (undated) DEVICE — PRECISION OFFSET (5.5 X 0.51 X 18.0MM)

## (undated) DEVICE — BIT DRL DIA2MM FOR 2.8MM SCR OMNI PLATING SYS

## (undated) DEVICE — SUTURE ETHLN SZ 3-0 L18IN NONABSORBABLE BLK FS-1 L24MM 3/8 663H

## (undated) DEVICE — THIN OFFSET (13.3 X 0.38 X 42.0MM)

## (undated) DEVICE — PRECISION OFFSET (9.0 X 0.64 X 35.0MM)

## (undated) DEVICE — DRAPE EQUIP FOOTSWITCH 24X20 IN PUL CORDAND CRD LCK NS

## (undated) DEVICE — CONTAINER SPEC 165OZ POLYPR PATH SNAP LOK CAP W/ LID

## (undated) DEVICE — GARMENT,MEDLINE,DVT,INT,CALF,LG, GEN2: Brand: MEDLINE

## (undated) DEVICE — CONNECTOR TBNG WHT PLAS SUCT STR 5IN1 LTWT W/ M CONN

## (undated) DEVICE — DRAPE C ARM W54XL84IN MINI FOR OEC 6800

## (undated) DEVICE — BLANKT WARM UPPR/BDY ARM/OUT 57X196CM

## (undated) DEVICE — SPONGE,LAP,18"X18",DLX,XR,ST,5/PK,40/PK: Brand: MEDLINE

## (undated) DEVICE — C-ARMOR C-ARM EQUIPMENT COVERS CLEAR STERILE UNIVERSAL FIT 12 PER CASE: Brand: C-ARMOR

## (undated) DEVICE — TOTAL TRAY, 16FR 10ML SIL FOLEY, URN: Brand: MEDLINE

## (undated) DEVICE — CANNULA SEAL

## (undated) DEVICE — 3M™ IOBAN™ 2 ANTIMICROBIAL INCISE DRAPE 6650EZ: Brand: IOBAN™ 2

## (undated) DEVICE — LAP PORT CLOSURE GUIDES 5MM AND 10/12MM: Brand: LAP PORT CLOSURE GUIDES 5MM AND 10/12MM

## (undated) DEVICE — SHORT CALCNL DRL 4.1MM

## (undated) DEVICE — TOWEL,OR,DSP,ST,BLUE,DLX,8/PK,10PK/CS: Brand: MEDLINE

## (undated) DEVICE — SUTURE ETHLN SZ 3-0 L18IN NONABSORBABLE BLK PS-2 L19MM 3/8 1669H

## (undated) DEVICE — BIT DRL DIA3MM CANN DISP FOR IO FIX IOFIX 2.0

## (undated) DEVICE — COVER LT HNDL BLU PLAS

## (undated) DEVICE — GAUZE,PACKING STRIP,IODOFORM,1/2"X5YD,ST: Brand: CURAD

## (undated) DEVICE — DRAPE C ARM UNIV W41XL74IN CLR PLAS XR VELC CLSR POLY STRP

## (undated) DEVICE — Z INACTIVE NO SUPPLIER SOLUTIONIRRIG 3000ML 0.9% SOD CHL FLX CONT [79720808] [HOSPIRA WORLDWIDE INC]

## (undated) DEVICE — TIP COVER ACCESSORY